# Patient Record
Sex: MALE | Race: WHITE | NOT HISPANIC OR LATINO | Employment: OTHER | URBAN - METROPOLITAN AREA
[De-identification: names, ages, dates, MRNs, and addresses within clinical notes are randomized per-mention and may not be internally consistent; named-entity substitution may affect disease eponyms.]

---

## 2018-05-02 LAB
ALBUMIN (HISTORICAL): 3 MG/DL
ALBUMIN SERPL BCP-MCNC: 4.2 G/DL (ref 3.5–5.7)
ALP SERPL-CCNC: 75 IU/L (ref 55–165)
ALT SERPL W P-5'-P-CCNC: 26 IU/L (ref 10–35)
ANION GAP SERPL CALCULATED.3IONS-SCNC: 11.1 MM/L
AST SERPL W P-5'-P-CCNC: 25 U/L (ref 8–27)
BASOPHILS # BLD AUTO: 0 X3/UL (ref 0–0.3)
BASOPHILS # BLD AUTO: 0.7 % (ref 0–2)
BILIRUB SERPL-MCNC: 0.4 MG/DL (ref 0.3–1)
BILIRUB UR QL STRIP: NEGATIVE
BILIRUBIN DIRECT (HISTORICAL): 0.1 MG/DL (ref 0–0.2)
BUN SERPL-MCNC: 18 MG/DL (ref 7–25)
CALCIUM SERPL-MCNC: 9.3 MG/DL (ref 8.6–10.5)
CHLORIDE SERPL-SCNC: 105 MM/L (ref 98–107)
CHOLEST SERPL-MCNC: 178 MG/DL (ref 0–200)
CLARITY UR: CLEAR
CO2 SERPL-SCNC: 28 MM/L (ref 21–31)
COLOR UR: YELLOW
CREAT SERPL-MCNC: 0.84 MG/DL (ref 0.7–1.3)
DEPRECATED RDW RBC AUTO: 15.4 %
EGFR (HISTORICAL): > 60 GFR
EGFR AFRICAN AMERICAN (HISTORICAL): > 60 GFR
EOSINOPHIL # BLD AUTO: 0.4 X3/UL (ref 0–0.5)
EOSINOPHIL NFR BLD AUTO: 5.4 % (ref 0–5)
EST. AVERAGE GLUCOSE BLD GHB EST-MCNC: 136 MG/DL
GLUCOSE (HISTORICAL): 135 MG/DL (ref 65–99)
GLUCOSE UR STRIP-MCNC: NEGATIVE MG/DL
HBA1C MFR BLD HPLC: 6.4 % (ref 4–6.2)
HCT VFR BLD AUTO: 43.2 % (ref 42–52)
HDLC SERPL-MCNC: 40 MG/DL (ref 40–60)
HGB BLD-MCNC: 14.7 G/DL (ref 14–18)
HGB UR QL STRIP.AUTO: NEGATIVE
KETONES UR STRIP-MCNC: NEGATIVE MG/DL
LDLC SERPL CALC-MCNC: 112.6 MG/DL (ref 75–193)
LEUKOCYTE ESTERASE UR QL STRIP: NEGATIVE
LYMPHOCYTES # BLD AUTO: 1.8 X3/UL (ref 1.2–4.2)
LYMPHOCYTES NFR BLD AUTO: 26.7 % (ref 20.5–51.1)
MCH RBC QN AUTO: 30 PG (ref 26–34)
MCHC RBC AUTO-ENTMCNC: 34 G/DL (ref 31–37)
MCV RBC AUTO: 88.2 FL (ref 81–99)
MONOCYTES # BLD AUTO: 0.8 X3/UL (ref 0–1)
MONOCYTES NFR BLD AUTO: 11.9 % (ref 1.7–12)
NEUTROPHILS # BLD AUTO: 3.8 X3/UL (ref 1.4–6.5)
NEUTS SEG NFR BLD AUTO: 55.3 % (ref 42.2–75.2)
NITRITE UR QL STRIP: NEGATIVE
OSMOLALITY, SERUM (HISTORICAL): 283 MOSM (ref 262–291)
PH UR STRIP.AUTO: 6 [PH] (ref 4.5–8)
PLATELET # BLD AUTO: 218 X3/UL (ref 130–400)
PMV BLD AUTO: 8.3 FL
POTASSIUM SERPL-SCNC: 4.1 MM/L (ref 3.5–5.5)
PROSTATE SPECIFIC ANTIGEN FREE (HISTORICAL): 0.2 NG/ML (ref 0.2–4.9)
PROSTATE SPECIFIC ANTIGEN PERCENT FREE (HISTORICAL): 35 %
PROT UR STRIP-MCNC: NEGATIVE MG/DL
PSA (HISTORICAL): 0.57 NG/ML (ref 0–4)
RBC # BLD AUTO: 4.9 X6/UL (ref 4.3–5.9)
SODIUM SERPL-SCNC: 140 MM/L (ref 134–143)
SP GR UR STRIP.AUTO: 1.02 (ref 1–1.03)
TOTAL PROTEIN (HISTORICAL): 6.7 G/DL (ref 6.4–8.9)
TRIGL SERPL-MCNC: 126 MG/DL (ref 44–166)
UROBILINOGEN UR QL STRIP.AUTO: 0.2 EU/DL (ref 0.2–8)
VLDL CHOLESTEROL (HISTORICAL): 25 MG/DL (ref 5–51)
WBC # BLD AUTO: 6.9 X3/UL (ref 4.8–10.8)

## 2019-03-19 ENCOUNTER — APPOINTMENT (OUTPATIENT)
Dept: LAB | Facility: HOSPITAL | Age: 69
End: 2019-03-19
Payer: MEDICARE

## 2019-03-19 ENCOUNTER — TRANSCRIBE ORDERS (OUTPATIENT)
Dept: ADMINISTRATIVE | Facility: HOSPITAL | Age: 69
End: 2019-03-19

## 2019-03-19 DIAGNOSIS — R53.83 FATIGUE, UNSPECIFIED TYPE: ICD-10-CM

## 2019-03-19 DIAGNOSIS — E11.8 TYPE 2 DIABETES MELLITUS WITH COMPLICATION, UNSPECIFIED WHETHER LONG TERM INSULIN USE: ICD-10-CM

## 2019-03-19 DIAGNOSIS — E11.8 TYPE 2 DIABETES MELLITUS WITH COMPLICATION, UNSPECIFIED WHETHER LONG TERM INSULIN USE: Primary | ICD-10-CM

## 2019-03-19 LAB
ALBUMIN SERPL BCP-MCNC: 4.1 G/DL (ref 3.5–5.7)
ALP SERPL-CCNC: 68 U/L (ref 55–165)
ALT SERPL W P-5'-P-CCNC: 56 U/L (ref 7–52)
ANION GAP SERPL CALCULATED.3IONS-SCNC: 8 MMOL/L (ref 4–13)
AST SERPL W P-5'-P-CCNC: 41 U/L (ref 13–39)
BASOPHILS # BLD AUTO: 0.1 THOUSANDS/ΜL (ref 0–0.1)
BASOPHILS NFR BLD AUTO: 1 % (ref 0–2)
BILIRUB DIRECT SERPL-MCNC: 0.1 MG/DL (ref 0–0.2)
BILIRUB SERPL-MCNC: 0.7 MG/DL (ref 0.2–1)
BUN SERPL-MCNC: 16 MG/DL (ref 7–25)
CALCIUM SERPL-MCNC: 9.5 MG/DL (ref 8.6–10.5)
CHLORIDE SERPL-SCNC: 103 MMOL/L (ref 98–107)
CHOLEST SERPL-MCNC: 162 MG/DL (ref 0–200)
CO2 SERPL-SCNC: 28 MMOL/L (ref 21–31)
CREAT SERPL-MCNC: 0.9 MG/DL (ref 0.7–1.3)
EOSINOPHIL # BLD AUTO: 0.4 THOUSAND/ΜL (ref 0–0.61)
EOSINOPHIL NFR BLD AUTO: 5 % (ref 0–5)
ERYTHROCYTE [DISTWIDTH] IN BLOOD BY AUTOMATED COUNT: 13.5 % (ref 11.5–14.5)
GFR SERPL CREATININE-BSD FRML MDRD: 87 ML/MIN/1.73SQ M
GLUCOSE P FAST SERPL-MCNC: 176 MG/DL (ref 65–99)
HCT VFR BLD AUTO: 45.2 % (ref 42–47)
HDLC SERPL-MCNC: 34 MG/DL (ref 40–60)
HGB BLD-MCNC: 15.5 G/DL (ref 14–18)
LDLC SERPL CALC-MCNC: 94 MG/DL (ref 75–193)
LYMPHOCYTES # BLD AUTO: 2 THOUSANDS/ΜL (ref 0.6–4.47)
LYMPHOCYTES NFR BLD AUTO: 24 % (ref 21–51)
MCH RBC QN AUTO: 30.5 PG (ref 26–34)
MCHC RBC AUTO-ENTMCNC: 34.3 G/DL (ref 31–37)
MCV RBC AUTO: 89 FL (ref 81–99)
MONOCYTES # BLD AUTO: 0.9 THOUSAND/ΜL (ref 0.17–1.22)
MONOCYTES NFR BLD AUTO: 11 % (ref 2–12)
NEUTROPHILS # BLD AUTO: 5 THOUSANDS/ΜL (ref 1.4–6.5)
NEUTS SEG NFR BLD AUTO: 59 % (ref 42–75)
NONHDLC SERPL-MCNC: 128 MG/DL
NRBC BLD AUTO-RTO: 0 /100 WBCS
PLATELET # BLD AUTO: 228 THOUSANDS/UL (ref 149–390)
PMV BLD AUTO: 9.3 FL (ref 8.6–11.7)
POTASSIUM SERPL-SCNC: 3.7 MMOL/L (ref 3.5–5.5)
PROT SERPL-MCNC: 6.9 G/DL (ref 6.4–8.9)
RBC # BLD AUTO: 5.08 MILLION/UL (ref 4.3–5.9)
SODIUM SERPL-SCNC: 139 MMOL/L (ref 134–143)
TRIGL SERPL-MCNC: 170 MG/DL (ref 44–166)
WBC # BLD AUTO: 8.4 THOUSAND/UL (ref 4.8–10.8)

## 2019-03-19 PROCEDURE — 80061 LIPID PANEL: CPT

## 2019-03-19 PROCEDURE — 80076 HEPATIC FUNCTION PANEL: CPT

## 2019-03-19 PROCEDURE — 80048 BASIC METABOLIC PNL TOTAL CA: CPT

## 2019-03-19 PROCEDURE — 85025 COMPLETE CBC W/AUTO DIFF WBC: CPT

## 2019-03-19 PROCEDURE — 36415 COLL VENOUS BLD VENIPUNCTURE: CPT

## 2019-08-19 ENCOUNTER — TRANSCRIBE ORDERS (OUTPATIENT)
Dept: ADMINISTRATIVE | Facility: HOSPITAL | Age: 69
End: 2019-08-19

## 2019-08-19 DIAGNOSIS — K76.0 FATTY LIVER: ICD-10-CM

## 2019-08-19 DIAGNOSIS — D12.5 BENIGN NEOPLASM OF SIGMOID COLON: ICD-10-CM

## 2019-08-19 DIAGNOSIS — K64.9 HEMORRHOIDS, UNSPECIFIED HEMORRHOID TYPE: ICD-10-CM

## 2019-08-19 DIAGNOSIS — K57.30 DIVERTICULOSIS LARGE INTESTINE W/O PERFORATION OR ABSCESS W/O BLEEDING: ICD-10-CM

## 2019-08-19 DIAGNOSIS — R94.5 ABNORMAL RESULTS OF LIVER FUNCTION STUDIES: Primary | ICD-10-CM

## 2019-08-24 ENCOUNTER — HOSPITAL ENCOUNTER (OUTPATIENT)
Dept: ULTRASOUND IMAGING | Facility: HOSPITAL | Age: 69
Discharge: HOME | End: 2019-08-24
Attending: INTERNAL MEDICINE
Payer: MEDICARE

## 2019-08-24 ENCOUNTER — TRANSCRIBE ORDERS (OUTPATIENT)
Dept: ADMINISTRATIVE | Facility: HOSPITAL | Age: 69
End: 2019-08-24

## 2019-08-24 ENCOUNTER — APPOINTMENT (OUTPATIENT)
Dept: LAB | Facility: HOSPITAL | Age: 69
End: 2019-08-24
Attending: INTERNAL MEDICINE
Payer: MEDICARE

## 2019-08-24 DIAGNOSIS — R94.5 ABNORMAL LIVER FUNCTION: ICD-10-CM

## 2019-08-24 DIAGNOSIS — K76.0 FATTY LIVER: ICD-10-CM

## 2019-08-24 DIAGNOSIS — K64.9 HEMORRHOIDS, UNSPECIFIED HEMORRHOID TYPE: ICD-10-CM

## 2019-08-24 DIAGNOSIS — R94.5 ABNORMAL LIVER FUNCTION: Primary | ICD-10-CM

## 2019-08-24 DIAGNOSIS — K57.30 DIVERTICULOSIS LARGE INTESTINE W/O PERFORATION OR ABSCESS W/O BLEEDING: ICD-10-CM

## 2019-08-24 DIAGNOSIS — R94.5 ABNORMAL RESULTS OF LIVER FUNCTION STUDIES: ICD-10-CM

## 2019-08-24 DIAGNOSIS — D12.5 BENIGN NEOPLASM OF SIGMOID COLON: ICD-10-CM

## 2019-08-24 LAB
FERRITIN SERPL-MCNC: 354 NG/ML (ref 8–388)
IRON SATN MFR SERPL: 36 %
IRON SERPL-MCNC: 104 UG/DL (ref 65–175)
TIBC SERPL-MCNC: 287 UG/DL (ref 250–450)

## 2019-08-24 PROCEDURE — 83550 IRON BINDING TEST: CPT

## 2019-08-24 PROCEDURE — 82728 ASSAY OF FERRITIN: CPT

## 2019-08-24 PROCEDURE — 80074 ACUTE HEPATITIS PANEL: CPT

## 2019-08-24 PROCEDURE — 82103 ALPHA-1-ANTITRYPSIN TOTAL: CPT

## 2019-08-24 PROCEDURE — 86038 ANTINUCLEAR ANTIBODIES: CPT

## 2019-08-24 PROCEDURE — 76700 US EXAM ABDOM COMPLETE: CPT

## 2019-08-24 PROCEDURE — 36415 COLL VENOUS BLD VENIPUNCTURE: CPT

## 2019-08-24 PROCEDURE — 82390 ASSAY OF CERULOPLASMIN: CPT

## 2019-08-24 PROCEDURE — 83540 ASSAY OF IRON: CPT

## 2019-08-24 PROCEDURE — 86235 NUCLEAR ANTIGEN ANTIBODY: CPT

## 2019-08-25 LAB
A1AT SERPL-MCNC: 127 MG/DL (ref 90–200)
CERULOPLASMIN SERPL-MCNC: 23.4 MG/DL (ref 16–31)

## 2019-08-26 LAB
ACTIN IGG SERPL-ACNC: 5 UNITS (ref 0–19)
RYE IGE QN: NEGATIVE

## 2019-08-28 LAB
HAV IGM SER QL: NORMAL
HBV CORE IGM SER QL: NORMAL
HBV SURFACE AG SER QL: NORMAL
HCV AB SER QL: NORMAL

## 2019-09-04 ENCOUNTER — TELEPHONE (OUTPATIENT)
Dept: NEPHROLOGY | Facility: CLINIC | Age: 69
End: 2019-09-04

## 2019-09-04 NOTE — TELEPHONE ENCOUNTER
Giuseppe Haiderak called for her  Lisa Cruz and stated that he was seen By Dr Kadeem Sanderson a gastroenterologist and stated that he should get a renal US due to a cyst on the kidneys  She wanted to know if he needs to come in for an appt because he was seen about a month ago or an order can be put in the chart for him to complete  Please advise

## 2019-09-05 DIAGNOSIS — N28.89 RENAL MASS, LEFT: Primary | ICD-10-CM

## 2019-09-05 NOTE — TELEPHONE ENCOUNTER
Kidney ultrasound reviewed, patient should proceed with an MRI to further evaluate the left sided cyst, the MRI will help us to determine if the cyst has any potential to turn into a tumor  If the likelihood is high then will go from there by referring to a Urologist, my hope though is that it confirms that it will likely not be tumor and we will continue to simply monitor with an ultrasound from time to time

## 2019-09-06 ENCOUNTER — HOSPITAL ENCOUNTER (EMERGENCY)
Facility: HOSPITAL | Age: 69
Discharge: HOME | End: 2019-09-06
Attending: EMERGENCY MEDICINE
Payer: MEDICARE

## 2019-09-06 ENCOUNTER — APPOINTMENT (EMERGENCY)
Dept: CT IMAGING | Facility: HOSPITAL | Age: 69
End: 2019-09-06
Payer: MEDICARE

## 2019-09-06 VITALS
WEIGHT: 265 LBS | RESPIRATION RATE: 18 BRPM | BODY MASS INDEX: 41.59 KG/M2 | SYSTOLIC BLOOD PRESSURE: 181 MMHG | HEIGHT: 67 IN | TEMPERATURE: 99.2 F | OXYGEN SATURATION: 98 % | HEART RATE: 80 BPM | DIASTOLIC BLOOD PRESSURE: 89 MMHG

## 2019-09-06 DIAGNOSIS — S22.32XA FRACTURE OF ONE RIB, LEFT SIDE, INITIAL ENCOUNTER FOR CLOSED FRACTURE: ICD-10-CM
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
## 2019-09-06 DIAGNOSIS — R91.1 PULMONARY NODULE: ICD-10-CM

## 2019-09-06 PROCEDURE — 90471 IMMUNIZATION ADMIN: CPT

## 2019-09-06 PROCEDURE — 99284 EMERGENCY DEPT VISIT MOD MDM: CPT

## 2019-09-06 PROCEDURE — 71250 CT THORAX DX C-: CPT

## 2019-09-06 PROCEDURE — 90715 TDAP VACCINE 7 YRS/> IM: CPT | Performed by: EMERGENCY MEDICINE

## 2019-09-06 PROCEDURE — 96372 THER/PROPH/DIAG INJ SC/IM: CPT

## 2019-09-06 RX ORDER — OXYCODONE HYDROCHLORIDE AND ACETAMINOPHEN 5; 325 MG/1; MG/1
1 TABLET ORAL EVERY 4 HOURS PRN
Qty: 15 TABLET | Refills: 0 | Status: SHIPPED | OUTPATIENT
Start: 2019-09-06 | End: 2019-09-24

## 2019-09-06 RX ORDER — OXYCODONE HYDROCHLORIDE AND ACETAMINOPHEN 5; 325 MG/1; MG/1
1 TABLET ORAL ONCE
Status: COMPLETED | OUTPATIENT
Start: 2019-09-06 | End: 2019-09-06

## 2019-09-06 RX ORDER — KETOROLAC TROMETHAMINE 30 MG/ML
30 INJECTION, SOLUTION INTRAMUSCULAR; INTRAVENOUS ONCE
Status: COMPLETED | OUTPATIENT
Start: 2019-09-06 | End: 2019-09-06

## 2019-09-06 RX ADMIN — KETOROLAC TROMETHAMINE 30 MG: 30 INJECTION, SOLUTION INTRAMUSCULAR; INTRAVENOUS at 19:41

## 2019-09-06 RX ADMIN — OXYCODONE HYDROCHLORIDE AND ACETAMINOPHEN 1 TABLET: 5; 325 TABLET ORAL at 19:04

## 2019-09-06 RX ADMIN — TETANUS TOXOID, REDUCED DIPHTHERIA TOXOID AND ACELLULAR PERTUSSIS VACCINE, ADSORBED 0.5 ML: 5; 2.5; 8; 8; 2.5 SUSPENSION INTRAMUSCULAR at 19:16

## 2019-09-06 NOTE — ED PROVIDER NOTES
History  Chief Complaint   Patient presents with    Fall     fell off tractor onto Left chest now with pain     Patient is a 17-year-old male with a history of diabetes and hypertension who takes no anticoagulants tetanus is not up-to-date was riding on a  and while getting off stumbled over the side of  and fell to his side striking his left upper lateral chest wall on a metal bar no head on had no fall to the ground no loss of consciousness no injury to the extremities sustained a few abrasions on the arm  No neck pain no head injury no anticoagulants  Patient complains of pain in the upper lateral left chest wall  History provided by:  Patient and relative  Fall   Mechanism of injury: fall    Injury location:  Torso  Torso injury location:  L chest  Incident location:  Around machinery  Time since incident:  2 hours  Fall:     Point of impact: Left chest wall  Suspicion of alcohol use: no    Tetanus status:  Out of date  Prior to arrival data:     Responsiveness at scene:  Alert    Orientation at scene:  Person, place, situation and time    Loss of consciousness: no      Amnesic to event: no    Associated symptoms: no abdominal pain, no headaches, no nausea and no vomiting        None       Past Medical History:   Diagnosis Date    Diabetes mellitus ([facility] [address] )     Disease of thyroid gland     Hypertension        Past Surgical History:   Procedure Laterality Date    APPENDECTOMY         History reviewed  No pertinent family history  I have reviewed and agree with the history as documented  Social History     Tobacco Use    Smoking status: Never Smoker    Smokeless tobacco: Never Used   Substance Use Topics    Alcohol use: Not on file    Drug use: Never        Review of Systems   Constitutional: Negative for activity change, appetite change, chills, fatigue and fever  HENT: Negative for congestion, ear pain, rhinorrhea and sore throat      Eyes: Negative for discharge, redness and visual disturbance  Respiratory: Negative for cough, chest tightness, shortness of breath and wheezing  Cardiovascular: Negative for palpitations  Left chest wall pain   Gastrointestinal: Negative for abdominal pain, constipation, diarrhea, nausea and vomiting  Endocrine: Negative for polydipsia and polyuria  Genitourinary: Negative for difficulty urinating, dysuria, frequency, hematuria and urgency  Musculoskeletal: Negative for arthralgias and myalgias  Skin: Negative for color change, pallor and rash  Neurological: Negative for dizziness, weakness, light-headedness, numbness and headaches  Hematological: Negative for adenopathy  Does not bruise/bleed easily  All other systems reviewed and are negative  Physical Exam  Physical Exam   Constitutional: He is oriented to person, place, and time  He appears well-developed and well-nourished  HENT:   Head: Normocephalic and atraumatic  Right Ear: External ear normal    Left Ear: External ear normal    Nose: Nose normal    Mouth/Throat: Oropharynx is clear and moist    Eyes: Pupils are equal, round, and reactive to light  Conjunctivae and EOM are normal    Neck: Normal range of motion  Neck supple  Cardiovascular: Normal rate, regular rhythm, normal heart sounds and intact distal pulses  Pulmonary/Chest: Effort normal and breath sounds normal  No respiratory distress  He has no wheezes  He has no rales  He exhibits tenderness  He exhibits no crepitus, no edema, no deformity and no retraction  Tenderness in the left upper lateral chest wall 4-6 inches below the left axilla no abdominal wall tenderness   Abdominal: Soft  Bowel sounds are normal  He exhibits no distension  There is no tenderness  There is no guarding  Musculoskeletal: Normal range of motion  Neurological: He is alert and oriented to person, place, and time  No cranial nerve deficit or sensory deficit  Skin: Skin is warm and dry     Psychiatric: He has a normal mood and affect  Nursing note and vitals reviewed  Vital Signs  ED Triage Vitals [09/06/19 1841]   Temperature Pulse Respirations Blood Pressure SpO2   99 2 °F (37 3 °C) 80 18 (!) 181/89 98 %      Temp Source Heart Rate Source Patient Position - Orthostatic VS BP Location FiO2 (%)   Tympanic Monitor Sitting Left arm --      Pain Score       8           Vitals:    09/06/19 1841   BP: (!) 181/89   Pulse: 80   Patient Position - Orthostatic VS: Sitting         Visual Acuity      ED Medications  Medications   oxyCODONE-acetaminophen (PERCOCET) 5-325 mg per tablet 1 tablet (1 tablet Oral Given 9/6/19 1904)   tetanus-diphtheria-acellular pertussis (BOOSTRIX) IM injection 0 5 mL (0 5 mL Intramuscular Given 9/6/19 1916)   ketorolac (TORADOL) injection 30 mg (30 mg Intramuscular Given 9/6/19 1941)       Diagnostic Studies  Results Reviewed     None                 CT chest without contrast   Final Result by Te Dennis MD (09/06 1957)      1  Essentially nondisplaced fracture of the left lateral 3rd rib  2   Scattered subcentimeter pulmonary nodules measuring up to 3 mm in size  Based on current Fleischner Society 2017 Guidelines on incidental pulmonary nodule, no routine follow-up is needed if the patient is considered low risk for lung cancer  If the    patient is considered high risk for lung cancer, 12 month follow-up non-contrast chest CT is recommended  The study was marked in EPIC for significant notification              Workstation performed: [id number]                    Procedures  Procedures       ED Course                               MDM  Number of Diagnoses or Management Options  Fall, initial encounter: new and requires workup  Fracture of one rib, left side, initial encounter for closed fracture: new and requires workup  Pulmonary nodule: new and requires workup  Diagnosis management comments: Patient remains clinically and hemodynamically stable in the emergency department he has made aware of pulmonary nodule incidentally found on CT also advised rest and supportive care and taking deep breaths for acute rib fracture advised follow-up with PCP for re-evaluation and obtain test results return precautions and anticipatory guidance discussed  Amount and/or Complexity of Data Reviewed  Tests in the radiology section of CPT®: ordered and reviewed  Independent visualization of images, tracings, or specimens: yes    Risk of Complications, Morbidity, and/or Mortality  Presenting problems: moderate  Diagnostic procedures: moderate  Management options: low    Patient Progress  Patient progress: stable      Disposition  Final diagnoses:   Fall, initial encounter   Fracture of one rib, left side, initial encounter for closed fracture - Left 3rd nondisplaced   Pulmonary nodule     Time reflects when diagnosis was documented in both MDM as applicable and the Disposition within this note     Time User Action Codes Description Comment    9/6/2019  8:02 PM Tiffany Clark Add Vijayse Norton  SYOL] Fall, initial encounter     9/6/2019  8:03 PM Shelbi Cabrera Add [S22 32XA] Fracture of one rib, left side, initial encounter for closed fracture     9/6/2019  8:03 PM Kavon Cabrera Modify [S22 32XA] Fracture of one rib, left side, initial encounter for closed fracture Left 3rd nondisplaced    9/6/2019  8:03 PM Tiffany Clark Add [R91 1] Pulmonary nodule       ED Disposition     ED Disposition Condition Date/Time Comment    Discharge Stable Fri Sep 6, 2019  8:02 PM [facility] discharge to home/self care              Follow-up Information     Follow up With Specialties Details Why [address] [facility], [address] [facility], Nurse Practitioner Schedule an appointment as soon as possible for a visit in 3 days Re-evaluation for broken rib and follow-up for pulmonary nodule [id number]  [phone]            Discharge Medication List as of 9/6/2019  8:03 PM      START taking these medications    Details   oxyCODONE-acetaminophen (PERCOCET) 5-325 mg per tablet Take 1 tablet by mouth every 4 (four) hours as needed for moderate pain for up to 15 dosesMax Daily Amount: 6 tablets, Starting Fri 9/6/2019, Print           No discharge procedures on file      ED Provider  Electronically Signed by           Edenilson Salomon DO  09/07/19 8693

## 2019-09-09 ENCOUNTER — TELEPHONE (OUTPATIENT)
Dept: NEPHROLOGY | Facility: CLINIC | Age: 69
End: 2019-09-09

## 2019-09-09 ENCOUNTER — HOSPITAL ENCOUNTER (OUTPATIENT)
Dept: MRI IMAGING | Facility: HOSPITAL | Age: 69
Discharge: HOME | End: 2019-09-09
Attending: INTERNAL MEDICINE

## 2019-09-09 DIAGNOSIS — F41.9 ANXIETY: Primary | ICD-10-CM

## 2019-09-09 DIAGNOSIS — N28.89 RENAL MASS, LEFT: ICD-10-CM

## 2019-09-09 DIAGNOSIS — S22.32XA FRACTURE OF ONE RIB, LEFT SIDE, INITIAL ENCOUNTER FOR CLOSED FRACTURE: ICD-10-CM

## 2019-09-09 RX ORDER — ALPRAZOLAM 0.5 MG/1
0.5 TABLET ORAL 4 TIMES DAILY PRN
Qty: 4 TABLET | Refills: 0 | Status: SHIPPED | OUTPATIENT
Start: 2019-09-09 | End: 2019-09-09 | Stop reason: SDUPTHER

## 2019-09-09 RX ORDER — ALPRAZOLAM 0.5 MG/1
0.5 TABLET ORAL 4 TIMES DAILY PRN
Qty: 4 TABLET | Refills: 0 | Status: SHIPPED | OUTPATIENT
Start: 2019-09-09 | End: 2019-09-24

## 2019-09-09 NOTE — TELEPHONE ENCOUNTER
Patient's wife called the patient was scheduled for an mri today, however he was unable to go through the tube  She is asking for medication to be sent for him so he can have the MRI

## 2019-09-24 ENCOUNTER — OFFICE VISIT (OUTPATIENT)
Dept: FAMILY MEDICINE CLINIC | Facility: CLINIC | Age: 69
End: 2019-09-24
Payer: MEDICARE

## 2019-09-24 VITALS
WEIGHT: 268 LBS | BODY MASS INDEX: 42.06 KG/M2 | TEMPERATURE: 99.4 F | HEART RATE: 72 BPM | SYSTOLIC BLOOD PRESSURE: 114 MMHG | RESPIRATION RATE: 18 BRPM | OXYGEN SATURATION: 100 % | HEIGHT: 67 IN | DIASTOLIC BLOOD PRESSURE: 64 MMHG

## 2019-09-24 DIAGNOSIS — Z12.5 SCREENING PSA (PROSTATE SPECIFIC ANTIGEN): ICD-10-CM

## 2019-09-24 DIAGNOSIS — Z23 NEEDS FLU SHOT: ICD-10-CM

## 2019-09-24 DIAGNOSIS — E66.01 CLASS 3 SEVERE OBESITY DUE TO EXCESS CALORIES WITH SERIOUS COMORBIDITY AND BODY MASS INDEX (BMI) OF 40.0 TO 44.9 IN ADULT (HCC): ICD-10-CM

## 2019-09-24 DIAGNOSIS — E11.9 DIABETIC EYE EXAM (HCC): ICD-10-CM

## 2019-09-24 DIAGNOSIS — Z01.00 DIABETIC EYE EXAM (HCC): ICD-10-CM

## 2019-09-24 DIAGNOSIS — E13.8 DIABETES MELLITUS OF OTHER TYPE WITH COMPLICATION, UNSPECIFIED WHETHER LONG TERM INSULIN USE: Primary | ICD-10-CM

## 2019-09-24 DIAGNOSIS — Z00.00 ENCOUNTER FOR MEDICARE ANNUAL WELLNESS EXAM: ICD-10-CM

## 2019-09-24 DIAGNOSIS — E78.00 HYPERCHOLESTEREMIA: ICD-10-CM

## 2019-09-24 DIAGNOSIS — I10 ESSENTIAL HYPERTENSION: ICD-10-CM

## 2019-09-24 DIAGNOSIS — E11.9 ENCOUNTER FOR DIABETIC FOOT EXAM (HCC): ICD-10-CM

## 2019-09-24 DIAGNOSIS — R91.1 LUNG NODULE SEEN ON IMAGING STUDY: ICD-10-CM

## 2019-09-24 PROBLEM — E66.813 CLASS 3 SEVERE OBESITY DUE TO EXCESS CALORIES WITH SERIOUS COMORBIDITY AND BODY MASS INDEX (BMI) OF 40.0 TO 44.9 IN ADULT (HCC): Status: ACTIVE | Noted: 2019-09-24

## 2019-09-24 LAB
CREAT UR-MCNC: 206 MG/DL
MICROALBUMIN UR-MCNC: 61.8 MG/L (ref 0–20)
MICROALBUMIN/CREAT 24H UR: 30 MG/G CREATININE (ref 0–30)
SL AMB POCT HEMOGLOBIN AIC: 6.6 (ref ?–6.5)

## 2019-09-24 PROCEDURE — 83036 HEMOGLOBIN GLYCOSYLATED A1C: CPT | Performed by: NURSE PRACTITIONER

## 2019-09-24 PROCEDURE — G0008 ADMIN INFLUENZA VIRUS VAC: HCPCS | Performed by: NURSE PRACTITIONER

## 2019-09-24 PROCEDURE — 90662 IIV NO PRSV INCREASED AG IM: CPT | Performed by: NURSE PRACTITIONER

## 2019-09-24 PROCEDURE — 82570 ASSAY OF URINE CREATININE: CPT | Performed by: NURSE PRACTITIONER

## 2019-09-24 PROCEDURE — 99214 OFFICE O/P EST MOD 30 MIN: CPT | Performed by: NURSE PRACTITIONER

## 2019-09-24 PROCEDURE — G0438 PPPS, INITIAL VISIT: HCPCS | Performed by: NURSE PRACTITIONER

## 2019-09-24 PROCEDURE — 82043 UR ALBUMIN QUANTITATIVE: CPT | Performed by: NURSE PRACTITIONER

## 2019-09-24 RX ORDER — POTASSIUM CHLORIDE 20 MEQ/1
20 TABLET, EXTENDED RELEASE ORAL DAILY
COMMUNITY
End: 2019-11-08 | Stop reason: SDUPTHER

## 2019-09-24 RX ORDER — LANCETS 33 GAUGE
EACH MISCELLANEOUS
COMMUNITY
Start: 2018-03-13 | End: 2021-06-16 | Stop reason: SDUPTHER

## 2019-09-24 RX ORDER — GLYBURIDE 5 MG/1
5 TABLET ORAL 2 TIMES DAILY
COMMUNITY
Start: 2019-08-18 | End: 2020-02-21 | Stop reason: SDUPTHER

## 2019-09-24 RX ORDER — FEXOFENADINE HCL 180 MG/1
180 TABLET ORAL DAILY PRN
COMMUNITY
End: 2021-07-06

## 2019-09-24 RX ORDER — POTASSIUM CHLORIDE 1.5 G/1.77G
POWDER, FOR SOLUTION ORAL
COMMUNITY
End: 2019-09-24

## 2019-09-24 RX ORDER — AMLODIPINE BESYLATE 10 MG/1
10 TABLET ORAL DAILY
COMMUNITY
Start: 2019-09-01 | End: 2020-02-21 | Stop reason: SDUPTHER

## 2019-09-24 RX ORDER — HYDROCHLOROTHIAZIDE 12.5 MG/1
12.5 TABLET ORAL DAILY
COMMUNITY
Start: 2019-08-18 | End: 2019-11-08 | Stop reason: SDUPTHER

## 2019-09-24 NOTE — PROGRESS NOTES
[facility]        NAME: Izabella Nayak is a 71 y o  male  : 1950    MRN: [id number]  DATE: 2019  TIME: 10:00 AM    Assessment and Plan   Diabetes mellitus of other type with complication, unspecified whether long term insulin use [E13 8]  1  Diabetes mellitus of other type with complication, unspecified whether long term insulin use  POCT hemoglobin A1c    Comprehensive metabolic panel    CBC and differential   2  Hypercholesteremia  Lipid panel   3  Screening PSA (prostate specific antigen)  PSA, total and free   4  Needs flu shot  influenza vaccine, [id number], high-dose, PF 0 5 mL (FLUZONE HIGH-DOSE)   5  Encounter for Medicare annual wellness exam     6  Encounter for diabetic foot exam ([place] [address] )     7  Class 3 severe obesity due to excess calories with serious comorbidity and body mass index (BMI) of 40 0 to 44 9 in adult ([facility] [address] )     8  Essential hypertension     9  Lung nodule seen on imaging study           Patient Instructions     Patient Instructions   Get labs as ordered  HgA1c today 6 6  Discussed diabetic diet/strict carb counting (60GM/day)  Colonoscopy UTD  Will get repeat CT scan lungs in 1 year for lung nodule  Call or return for problems/concerns          Chief Complaint     Chief Complaint   Patient presents with   [place] Establish Care         History of Present Illness       Here to establish care- previously seen by Nessa Abel-  Broken rib- reviewed CT scan from 19- 3mm lung nodules seen- recommended repeat CT scan in 12 months for high risk- 1-2ppd x 23 years- will get repeat CT scan 2020  Broken right left lateral 3rd rib- nondisplaced  Left kidney cyst seen on US- getting open MRI tomorrow  Also concerned about fatty liver- discussed weight loss to treat      Review of Systems   Review of Systems   Constitutional: Negative for activity change, diaphoresis, fatigue and fever     HENT: Negative for congestion, facial swelling, hearing loss, rhinorrhea, sinus pressure, sinus pain, sneezing, sore throat and voice change  Eyes: Negative for discharge and visual disturbance  Respiratory: Negative for cough, choking, chest tightness, shortness of breath, wheezing and stridor  Cardiovascular: Positive for chest pain (left 3rd rib fracture x 3 weeks)  Negative for palpitations and leg swelling  Gastrointestinal: Negative for abdominal distention, abdominal pain, constipation, diarrhea, nausea and vomiting  Endocrine: Negative for polydipsia, polyphagia and polyuria  Genitourinary: Negative for difficulty urinating, dysuria, frequency and urgency  Musculoskeletal: Negative for arthralgias, back pain, gait problem, joint swelling, myalgias, neck pain and neck stiffness  Skin: Negative for color change, rash and wound  Neurological: Negative for dizziness, syncope, speech difficulty, weakness, light-headedness and headaches  Hematological: Negative for adenopathy  Does not bruise/bleed easily  Psychiatric/Behavioral: Negative for agitation, behavioral problems, confusion, hallucinations, sleep disturbance and suicidal ideas  The patient is not nervous/anxious            Current Medications       Current Outpatient Medications:     amLODIPine (NORVASC) 10 mg tablet, Take 10 mg by mouth daily, Disp: , Rfl:     NELLY ASPIRIN EC LOW DOSE PO, Take 81 mg by mouth daily, Disp: , Rfl:     fexofenadine (ALLEGRA ALLERGY) 180 MG tablet, Take 180 mg by mouth daily as needed, Disp: , Rfl:     glyBURIDE (DIABETA) 5 mg tablet, Take 5 mg by mouth 2 (two) times a day, Disp: , Rfl:     hydrochlorothiazide (HYDRODIURIL) 12 5 mg tablet, Take 12 5 mg by mouth daily, Disp: , Rfl:     Multiple Vitamins-Minerals (CENTRUM SILVER 50+MEN PO), Centrum Silver, Disp: , Rfl:     ONETOUCH DELICA LANCETS 36P MISC, TEST daily, Disp: , Rfl:     potassium chloride (K-DUR,KLOR-CON) 20 mEq tablet, Take 20 mEq by mouth daily, Disp: , Rfl:     Current Allergies     Allergies as of 09/24/2019    (No Known Allergies)            The following portions of the patient's history were reviewed and updated as appropriate: allergies, current medications, past family history, past medical history, past social history, past surgical history and problem list      Past Medical History:   Diagnosis Date    Diabetes mellitus ([address] )     Disease of thyroid gland     Hypertension        Past Surgical History:   Procedure Laterality Date    APPENDECTOMY         History reviewed  No pertinent family history  Medications have been verified  Objective   /64   Pulse 72   Temp 99 4 °F (37 4 °C)   Resp 18   Ht 5' 7" (1 702 m)   Wt 122 kg (268 lb)   SpO2 100%   BMI 41 97 kg/m²        Physical Exam     Physical Exam   Constitutional: He is oriented to person, place, and time  He appears well-developed and well-nourished  No distress  Neck: Normal range of motion  Neck supple  No tracheal deviation present  No thyromegaly present  Cardiovascular: Normal rate, regular rhythm and normal heart sounds  Pulses are no weak pulses  No murmur heard  Pulses:       Dorsalis pedis pulses are 2+ on the right side, and 2+ on the left side  Pulmonary/Chest: Effort normal and breath sounds normal  No respiratory distress  He has no wheezes  Musculoskeletal: Normal range of motion  He exhibits no edema, tenderness or deformity  Feet:   Right Foot:   Skin Integrity: Positive for dry skin  Negative for ulcer, skin breakdown, erythema, warmth or callus  Left Foot:   Skin Integrity: Positive for dry skin  Negative for ulcer, skin breakdown, erythema, warmth or callus  Neurological: He is alert and oriented to person, place, and time  Skin: Skin is warm and dry  He is not diaphoretic  Psychiatric: He has a normal mood and affect  His behavior is normal  Judgment and thought content normal    Nursing note and vitals reviewed  BMI Counseling: Body mass index is 41 97 kg/m²   The BMI is above normal  Nutrition recommendations include reducing fast food intake, consuming healthier snacks, decreasing soda and/or juice intake and moderation in carbohydrate intake  PHQ-9 Depression Screening    PHQ-9:    Frequency of the following problems over the past two weeks:       Little interest or pleasure in doing things:  0 - not at all  Feeling down, depressed, or hopeless:  0 - not at all  PHQ-2 Score:  0       Patient's shoes and socks removed  Right Foot/Ankle   Right Foot Inspection  Skin Exam: skin normal, skin intact and dry skin no warmth, no callus, no erythema, no maceration, no abnormal color, no pre-ulcer, no ulcer and no callus                          Toe Exam: ROM and strength within normal limits  Sensory       Monofilament testing: intact  Vascular  Capillary refills: < 3 seconds  The right DP pulse is 2+  Left Foot/Ankle  Left Foot Inspection  Skin Exam: skin normal, skin intact and dry skinno warmth, no erythema, no maceration, normal color, no pre-ulcer, no ulcer and no callus                         Toe Exam: ROM and strength within normal limits                   Sensory       Monofilament: intact  Vascular  Capillary refills: < 3 seconds  The left DP pulse is 2+  Assign Risk Category:  No deformity present; No loss of protective sensation;  No weak pulses       Risk: 0

## 2019-09-24 NOTE — PATIENT INSTRUCTIONS
Get labs as ordered  HgA1c today 6 6  Discussed diabetic diet/strict carb counting (60GM/day)  Colonoscopy UTD  Will get repeat CT scan lungs in 1 year for lung nodule  Call or return for problems/concerns

## 2019-09-24 NOTE — PROGRESS NOTES
Assessment and Plan:     Problem List Items Addressed This Visit        Endocrine    Diabetes mellitus ([facility] )    Relevant Medications    glyBURIDE (DIABETA) 5 mg tablet    Other Relevant Orders    POCT hemoglobin A1c (Completed)    Comprehensive metabolic panel    CBC and differential       Cardiovascular and Mediastinum    Hypertension    Relevant Medications    amLODIPine (NORVASC) 10 mg tablet    hydrochlorothiazide (HYDRODIURIL) 12 5 mg tablet       Other    Hypercholesteremia    Relevant Orders    Lipid panel    Encounter for diabetic foot exam ([place] [address] )    Class 3 severe obesity due to excess calories with serious comorbidity and body mass index (BMI) of 40 0 to 44 9 in adult [facility])    Lung nodule seen on imaging study      Other Visit Diagnoses     Encounter for Medicare annual wellness exam    -  Primary    Screening PSA (prostate specific antigen)        Relevant Orders    PSA, total and free    Needs flu shot        Relevant Orders    influenza vaccine, 6440-1151, high-dose, PF 0 5 mL (FLUZONE HIGH-DOSE)        BMI Counseling: Body mass index is 41 97 kg/m²  The BMI is above normal  Nutrition recommendations include decreasing fast food intake, consuming healthier snacks, limiting drinks that contain sugar and moderation in carbohydrate intake  No pharmacotherapy was ordered  Preventive health issues were discussed with patient, and age appropriate screening tests were ordered as noted in patient's After Visit Summary  Personalized health advice and appropriate referrals for health education or preventive services given if needed, as noted in patient's After Visit Summary       History of Present Illness:     Patient presents for Medicare Annual Wellness visit    Patient Care Team:  Janay Pineda as PCP - General (Family Medicine)     Problem List:     Patient Active Problem List   Diagnosis    Diabetes mellitus ([facility] )    Hypercholesteremia    Encounter for diabetic foot exam ([place] [address] )    Class 3 severe obesity due to excess calories with serious comorbidity and body mass index (BMI) of 40 0 to 44 9 in adult ([place] [address] )    Hypertension    Lung nodule seen on imaging study      Past Medical and Surgical History:     Past Medical History:   Diagnosis Date    Diabetes mellitus ([facility] )     Disease of thyroid gland     Hypertension      Past Surgical History:   Procedure Laterality Date    APPENDECTOMY        Family History:     History reviewed  No pertinent family history     Social History:     Social History     Socioeconomic History    Marital status: /Civil Union     Spouse name: None    Number of children: None    Years of education: None    Highest education level: None   Occupational History    None   Social Needs    Financial resource strain: None    Food insecurity:     Worry: None     Inability: None    Transportation needs:     Medical: None     Non-medical: None   Tobacco Use    Smoking status: Never Smoker    Smokeless tobacco: Never Used   Substance and Sexual Activity    Alcohol use: None    Drug use: Never    Sexual activity: None   Lifestyle    Physical activity:     Days per week: None     Minutes per session: None    Stress: None   Relationships    Social connections:     Talks on phone: None     Gets together: None     Attends Shinto service: None     Active member of club or organization: None     Attends meetings of clubs or organizations: None     Relationship status: None    Intimate partner violence:     Fear of current or ex partner: None     Emotionally abused: None     Physically abused: None     Forced sexual activity: None   Other Topics Concern    None   Social History Narrative    None       Medications and Allergies:     Current Outpatient Medications   Medication Sig Dispense Refill    amLODIPine (NORVASC) 10 mg tablet Take 10 mg by mouth daily      NELLY ASPIRIN EC LOW DOSE PO Take 81 mg by mouth daily      fexofenadine (ALLEGRA ALLERGY) 180 MG tablet Take 180 mg by mouth daily as needed      glyBURIDE (DIABETA) 5 mg tablet Take 5 mg by mouth 2 (two) times a day      hydrochlorothiazide (HYDRODIURIL) 12 5 mg tablet Take 12 5 mg by mouth daily      Multiple Vitamins-Minerals (CENTRUM SILVER 50+MEN PO) Centrum Silver      ONETOUCH DELICA LANCETS 49S MISC TEST daily      potassium chloride (K-DUR,KLOR-CON) 20 mEq tablet Take 20 mEq by mouth daily       No current facility-administered medications for this visit  No Known Allergies   Immunizations:     Immunization History   Administered Date(s) Administered    INFLUENZA 10/20/2016, 10/11/2017, 09/19/2018    Pneumococcal Conjugate 13-Valent 10/20/2016    Pneumococcal Polysaccharide PPV23 03/12/2018    Tdap 10/26/2014, 09/06/2019    Zoster 11/10/2014      Health Maintenance:         Topic Date Due    CRC Screening: Colonoscopy  1950    CRC Screening: FOBTx3/FIT  08/08/2000    Hepatitis C Screening  Completed         Topic Date Due    HEPATITIS B VACCINES (1 of 3 - Risk 3-dose series) 08/08/1969    INFLUENZA VACCINE  07/01/2019      Medicare Health Risk Assessment:     /64   Pulse 72   Temp 99 4 °F (37 4 °C)   Resp 18   Ht 5' 7" (1 702 m)   Wt 122 kg (268 lb)   SpO2 100%   BMI 41 97 kg/m²      Traci Paulie is here for his Subsequent Wellness visit  Health Risk Assessment:   Patient rates overall health as good  Patient feels that their physical health rating is same  Eyesight was rated as same  Hearing was rated as same  Patient feels that their emotional and mental health rating is same  Pain experienced in the last 7 days has been some  Patient states that he has experienced no weight loss or gain in last 6 months  Depression Screening:   PHQ-2 Score: 0      Fall Risk Screening: In the past year, patient has experienced: no history of falling in past year      Home Safety:  Patient does not have trouble with stairs inside or outside of their home   Patient has working smoke alarms and has working carbon monoxide detector  Home safety hazards include: none  Nutrition:   Current diet is Regular  Medications:   Patient is currently taking over-the-counter supplements  OTC medications include: see medication list  Patient is able to manage medications  Activities of Daily Living (ADLs)/Instrumental Activities of Daily Living (IADLs):   Walk and transfer into and out of bed and chair?: Yes  Dress and groom yourself?: Yes    Bathe or shower yourself?: Yes    Feed yourself?  Yes  Do your laundry/housekeeping?: Yes  Manage your money, pay your bills and track your expenses?: Yes  Make your own meals?: Yes    Do your own shopping?: Yes    Previous Hospitalizations:   Any hospitalizations or ED visits within the last 12 months?: No      Advance Care Planning:   Living will: No    Durable POA for healthcare: No    Advanced directive: No    Advanced directive counseling given: No    Five wishes given: No    Patient declined ACP directive: No    End of Life Decisions reviewed with patient: No    Provider agrees with end of life decisions: No      PREVENTIVE SCREENINGS      Cardiovascular Screening:    General: History Lipid Disorder and Screening Current      Diabetes Screening:     General: History Diabetes and Screening Current      Colorectal Cancer Screening:     General: Screening Current      Prostate Cancer Screening:      Due for: PSA      Osteoporosis Screening:    General: Screening Not Indicated      Abdominal Aortic Aneurysm (AAA) Screening:    Risk factors include: age between 73-69 yo        Lung Cancer Screening:     General: Screening Current      Hepatitis C Screening:    General: Screening Current      BRANDI Rivera

## 2019-09-30 LAB
LEFT EYE DIABETIC RETINOPATHY: NORMAL
RIGHT EYE DIABETIC RETINOPATHY: NORMAL

## 2019-10-22 ENCOUNTER — OFFICE VISIT (OUTPATIENT)
Dept: FAMILY MEDICINE CLINIC | Facility: CLINIC | Age: 69
End: 2019-10-22
Payer: MEDICARE

## 2019-10-22 VITALS
SYSTOLIC BLOOD PRESSURE: 144 MMHG | HEART RATE: 72 BPM | WEIGHT: 286 LBS | OXYGEN SATURATION: 98 % | TEMPERATURE: 100 F | DIASTOLIC BLOOD PRESSURE: 68 MMHG | HEIGHT: 67 IN | RESPIRATION RATE: 18 BRPM | BODY MASS INDEX: 44.89 KG/M2

## 2019-10-22 DIAGNOSIS — M54.50 ACUTE RIGHT-SIDED LOW BACK PAIN WITHOUT SCIATICA: Primary | ICD-10-CM

## 2019-10-22 PROCEDURE — 99214 OFFICE O/P EST MOD 30 MIN: CPT | Performed by: NURSE PRACTITIONER

## 2019-10-22 RX ORDER — PREDNISONE 20 MG/1
TABLET ORAL
Qty: 18 TABLET | Refills: 0 | Status: SHIPPED | OUTPATIENT
Start: 2019-10-22 | End: 2020-02-21

## 2019-10-22 RX ORDER — PIOGLITAZONEHYDROCHLORIDE 15 MG/1
TABLET ORAL
COMMUNITY
Start: 2019-09-26 | End: 2020-02-21 | Stop reason: SDUPTHER

## 2019-10-22 RX ORDER — CYCLOBENZAPRINE HCL 10 MG
10 TABLET ORAL 3 TIMES DAILY PRN
Qty: 30 TABLET | Refills: 0 | Status: SHIPPED | OUTPATIENT
Start: 2019-10-22 | End: 2020-03-19

## 2019-10-22 RX ORDER — NAPROXEN 500 MG/1
500 TABLET ORAL 2 TIMES DAILY WITH MEALS
Qty: 30 TABLET | Refills: 0 | Status: SHIPPED | OUTPATIENT
Start: 2019-10-22 | End: 2020-03-19

## 2019-10-22 NOTE — PROGRESS NOTES
[address] [facility]        NAME: Eloy Parikh is a 71 y o  male  : 1950    MRN: [id number]  DATE: 2019  TIME: 2:39 PM    Assessment and Plan   Acute right-sided low back pain without sciatica [M54 5]  1  Acute right-sided low back pain without sciatica  predniSONE 20 mg tablet    cyclobenzaprine (FLEXERIL) 10 mg tablet    naproxen (NAPROSYN) 500 mg tablet         Patient Instructions     Patient Instructions   Take flexeril at night  Take prednisone and naproxen as prescribed  Heating pad to lower back  Chief Complaint     Chief Complaint   Patient presents with    Back Pain         History of Present Illness        Patient here with c/o back patient starting  4 days ago  Back Pain   This is a new problem  The current episode started in the past 7 days  The problem occurs constantly  The problem is unchanged  The pain is present in the lumbar spine  Pertinent negatives include no abdominal pain, chest pain, dysuria, fever, headaches, numbness or weakness  The treatment provided mild relief  Review of Systems   Review of Systems   Constitutional: Negative for activity change, appetite change, chills, fatigue and fever  HENT: Negative for congestion, ear pain, nosebleeds, rhinorrhea and sore throat  Eyes: Negative for photophobia, pain, redness and visual disturbance  Respiratory: Negative for cough, shortness of breath and wheezing  Cardiovascular: Negative  Negative for chest pain  Gastrointestinal: Negative  Negative for abdominal pain, constipation, diarrhea and vomiting  Endocrine: Negative  Genitourinary: Negative for difficulty urinating, dysuria and flank pain  Musculoskeletal: Positive for back pain  Skin: Negative for color change and rash  Neurological: Negative for dizziness, weakness, numbness and headaches  Hematological: Negative for adenopathy  Psychiatric/Behavioral: Negative for agitation and confusion   The patient is not nervous/anxious  PHQ-9 Depression Screening    PHQ-9:    Frequency of the following problems over the past two weeks:       Little interest or pleasure in doing things:  0 - not at all  Feeling down, depressed, or hopeless:  0 - not at all  PHQ-2 Score:  0        Current Medications       Current Outpatient Medications:     amLODIPine (NORVASC) 10 mg tablet, Take 10 mg by mouth daily, Disp: , Rfl:     NELLY ASPIRIN EC LOW DOSE PO, Take 81 mg by mouth daily, Disp: , Rfl:     fexofenadine (ALLEGRA ALLERGY) 180 MG tablet, Take 180 mg by mouth daily as needed, Disp: , Rfl:     glyBURIDE (DIABETA) 5 mg tablet, Take 5 mg by mouth 2 (two) times a day, Disp: , Rfl:     hydrochlorothiazide (HYDRODIURIL) 12 5 mg tablet, Take 12 5 mg by mouth daily, Disp: , Rfl:     Multiple Vitamins-Minerals (CENTRUM SILVER 50+MEN PO), Centrum Silver, Disp: , Rfl:     ONETOUCH DELICA LANCETS 25V MISC, TEST daily, Disp: , Rfl:     pioglitazone (ACTOS) 15 mg tablet, , Disp: , Rfl:     potassium chloride (K-DUR,KLOR-CON) 20 mEq tablet, Take 20 mEq by mouth daily, Disp: , Rfl:     Current Allergies     Allergies as of 10/22/2019    (No Known Allergies)            The following portions of the patient's history were reviewed and updated as appropriate: allergies, current medications, past family history, past medical history, past social history, past surgical history and problem list      Past Medical History:   Diagnosis Date    Diabetes mellitus ([facility] )     Disease of thyroid gland     Hypertension        Past Surgical History:   Procedure Laterality Date    APPENDECTOMY         History reviewed  No pertinent family history  Medications have been verified          Objective   /68   Pulse 72   Temp 100 °F (37 8 °C) (Tympanic)   Resp 18   Ht 5' 7" (1 702 m)   Wt 130 kg (286 lb)   SpO2 98%   BMI 44 79 kg/m²        Physical Exam     Physical Exam   Constitutional: He is oriented to person, place, and time  He appears well-developed and well-nourished  He is cooperative  He does not appear ill  No distress  Eyes: Lids are normal    Cardiovascular: Normal rate, regular rhythm, S1 normal, S2 normal, normal heart sounds and intact distal pulses  Exam reveals no gallop and no friction rub  No murmur heard  Pulmonary/Chest: Effort normal and breath sounds normal  No respiratory distress  He has no decreased breath sounds  He has no wheezes  Musculoskeletal: Normal range of motion  He exhibits no edema or deformity  Lumbar back: He exhibits tenderness  He exhibits normal range of motion, no bony tenderness, no swelling, no deformity, no pain and no spasm  Back:    +ttp  Able to ambulate without difficulty  Neurological: He is alert and oriented to person, place, and time  Skin: Skin is warm  No rash noted  No erythema  Psychiatric: He has a normal mood and affect  His behavior is normal  Thought content normal    Nursing note and vitals reviewed

## 2019-11-08 DIAGNOSIS — I10 ESSENTIAL HYPERTENSION: Primary | ICD-10-CM

## 2019-11-08 DIAGNOSIS — Z76.0 MEDICATION REFILL: ICD-10-CM

## 2019-11-08 RX ORDER — HYDROCHLOROTHIAZIDE 12.5 MG/1
12.5 TABLET ORAL DAILY
Qty: 90 TABLET | Refills: 1 | Status: SHIPPED | OUTPATIENT
Start: 2019-11-08 | End: 2019-11-11 | Stop reason: SDUPTHER

## 2019-11-08 RX ORDER — POTASSIUM CHLORIDE 20 MEQ/1
20 TABLET, EXTENDED RELEASE ORAL DAILY
Qty: 90 TABLET | Refills: 1 | Status: SHIPPED | OUTPATIENT
Start: 2019-11-08 | End: 2019-11-11 | Stop reason: SDUPTHER

## 2019-11-11 DIAGNOSIS — I10 ESSENTIAL HYPERTENSION: ICD-10-CM

## 2019-11-11 DIAGNOSIS — Z76.0 MEDICATION REFILL: ICD-10-CM

## 2019-11-11 RX ORDER — HYDROCHLOROTHIAZIDE 12.5 MG/1
12.5 TABLET ORAL DAILY
Qty: 90 TABLET | Refills: 1 | Status: SHIPPED | OUTPATIENT
Start: 2019-11-11 | End: 2020-02-06 | Stop reason: SDUPTHER

## 2019-11-11 RX ORDER — POTASSIUM CHLORIDE 20 MEQ/1
20 TABLET, EXTENDED RELEASE ORAL DAILY
Qty: 90 TABLET | Refills: 1 | Status: SHIPPED | OUTPATIENT
Start: 2019-11-11 | End: 2020-02-21 | Stop reason: SDUPTHER

## 2020-02-06 DIAGNOSIS — I10 ESSENTIAL HYPERTENSION: ICD-10-CM

## 2020-02-06 RX ORDER — HYDROCHLOROTHIAZIDE 12.5 MG/1
12.5 TABLET ORAL DAILY
Qty: 90 TABLET | Refills: 0 | Status: SHIPPED | OUTPATIENT
Start: 2020-02-06 | End: 2020-02-21 | Stop reason: SDUPTHER

## 2020-02-21 ENCOUNTER — OFFICE VISIT (OUTPATIENT)
Dept: FAMILY MEDICINE CLINIC | Facility: CLINIC | Age: 70
End: 2020-02-21
Payer: MEDICARE

## 2020-02-21 VITALS
OXYGEN SATURATION: 99 % | TEMPERATURE: 99.3 F | HEIGHT: 67 IN | WEIGHT: 289 LBS | BODY MASS INDEX: 45.36 KG/M2 | HEART RATE: 80 BPM | DIASTOLIC BLOOD PRESSURE: 78 MMHG | RESPIRATION RATE: 18 BRPM | SYSTOLIC BLOOD PRESSURE: 130 MMHG

## 2020-02-21 DIAGNOSIS — E11.9 TYPE 2 DIABETES MELLITUS WITHOUT COMPLICATION, WITH LONG-TERM CURRENT USE OF INSULIN (HCC): Primary | ICD-10-CM

## 2020-02-21 DIAGNOSIS — Z79.4 TYPE 2 DIABETES MELLITUS WITHOUT COMPLICATION, WITH LONG-TERM CURRENT USE OF INSULIN (HCC): Primary | ICD-10-CM

## 2020-02-21 DIAGNOSIS — E66.01 CLASS 3 SEVERE OBESITY DUE TO EXCESS CALORIES WITH SERIOUS COMORBIDITY AND BODY MASS INDEX (BMI) OF 40.0 TO 44.9 IN ADULT (HCC): ICD-10-CM

## 2020-02-21 DIAGNOSIS — I10 ESSENTIAL HYPERTENSION: ICD-10-CM

## 2020-02-21 DIAGNOSIS — N28.1 RENAL CYST, LEFT: ICD-10-CM

## 2020-02-21 DIAGNOSIS — E78.2 MIXED HYPERLIPIDEMIA: ICD-10-CM

## 2020-02-21 LAB — SL AMB POCT HEMOGLOBIN AIC: 6.5 (ref ?–6.5)

## 2020-02-21 PROCEDURE — 3044F HG A1C LEVEL LT 7.0%: CPT | Performed by: NURSE PRACTITIONER

## 2020-02-21 PROCEDURE — 83036 HEMOGLOBIN GLYCOSYLATED A1C: CPT | Performed by: NURSE PRACTITIONER

## 2020-02-21 PROCEDURE — 3008F BODY MASS INDEX DOCD: CPT | Performed by: NURSE PRACTITIONER

## 2020-02-21 PROCEDURE — 99214 OFFICE O/P EST MOD 30 MIN: CPT | Performed by: NURSE PRACTITIONER

## 2020-02-21 PROCEDURE — 3078F DIAST BP <80 MM HG: CPT | Performed by: NURSE PRACTITIONER

## 2020-02-21 PROCEDURE — 2022F DILAT RTA XM EVC RTNOPTHY: CPT | Performed by: NURSE PRACTITIONER

## 2020-02-21 PROCEDURE — 1036F TOBACCO NON-USER: CPT | Performed by: NURSE PRACTITIONER

## 2020-02-21 PROCEDURE — 3075F SYST BP GE 130 - 139MM HG: CPT | Performed by: NURSE PRACTITIONER

## 2020-02-21 PROCEDURE — 4040F PNEUMOC VAC/ADMIN/RCVD: CPT | Performed by: NURSE PRACTITIONER

## 2020-02-21 PROCEDURE — 4010F ACE/ARB THERAPY RXD/TAKEN: CPT | Performed by: NURSE PRACTITIONER

## 2020-02-21 PROCEDURE — 1160F RVW MEDS BY RX/DR IN RCRD: CPT | Performed by: NURSE PRACTITIONER

## 2020-02-21 RX ORDER — GLYBURIDE 5 MG/1
5 TABLET ORAL 2 TIMES DAILY
Qty: 180 TABLET | Refills: 1 | Status: SHIPPED | OUTPATIENT
Start: 2020-02-21 | End: 2020-06-19 | Stop reason: SDUPTHER

## 2020-02-21 RX ORDER — HYDROCHLOROTHIAZIDE 12.5 MG/1
12.5 TABLET ORAL DAILY
Qty: 90 TABLET | Refills: 1 | Status: SHIPPED | OUTPATIENT
Start: 2020-02-21 | End: 2020-06-19 | Stop reason: SDUPTHER

## 2020-02-21 RX ORDER — AMLODIPINE BESYLATE 10 MG/1
10 TABLET ORAL DAILY
Qty: 90 TABLET | Refills: 1 | Status: SHIPPED | OUTPATIENT
Start: 2020-02-21 | End: 2020-06-19 | Stop reason: SDUPTHER

## 2020-02-21 RX ORDER — POTASSIUM CHLORIDE 20 MEQ/1
20 TABLET, EXTENDED RELEASE ORAL DAILY
Qty: 90 TABLET | Refills: 1 | Status: SHIPPED | OUTPATIENT
Start: 2020-02-21 | End: 2020-06-19 | Stop reason: SDUPTHER

## 2020-02-21 RX ORDER — EZETIMIBE 10 MG/1
10 TABLET ORAL DAILY
Qty: 90 TABLET | Refills: 1 | Status: SHIPPED | OUTPATIENT
Start: 2020-02-21 | End: 2020-09-11 | Stop reason: SDUPTHER

## 2020-02-21 RX ORDER — PIOGLITAZONEHYDROCHLORIDE 15 MG/1
15 TABLET ORAL DAILY
Qty: 90 TABLET | Refills: 1 | Status: SHIPPED | OUTPATIENT
Start: 2020-02-21 | End: 2020-06-19 | Stop reason: SDUPTHER

## 2020-02-21 NOTE — PROGRESS NOTES
[facility] [facility]        NAME: Tono Goodson is a 71 y o  male  : 1950    MRN: [id number]  DATE: 2020  TIME: 3:22 PM    Assessment and Plan   Type 2 diabetes mellitus without complication, with long-term current use of insulin (HCC) [E11 9, Z79 4]  1  Type 2 diabetes mellitus without complication, with long-term current use of insulin (HCC)  POCT hemoglobin A1c    glyBURIDE (DIABETA) 5 mg tablet    pioglitazone (ACTOS) 15 mg tablet    CANCELED: HEMOGLOBIN A1C W/ EAG ESTIMATION   2  Class 3 severe obesity due to excess calories with serious comorbidity and body mass index (BMI) of 40 0 to 44 9 in adult ([facility] )     3  Renal cyst, left  Ambulatory referral to Nephrology    CANCELED: Ambulatory referral to Nephrology    CANCELED: MRI abdomen w wo contrast    yearly US, followed by Dr Aramis Hendrix   4  Essential hypertension  amLODIPine (NORVASC) 10 mg tablet    hydrochlorothiazide (HYDRODIURIL) 12 5 mg tablet    potassium chloride (K-DUR,KLOR-CON) 20 mEq tablet   5  Mixed hyperlipidemia  ezetimibe (ZETIA) 10 mg tablet         Patient Instructions     Patient Instructions   Referral given for Dr Araims Hendrix to f/u on MRI results from 2019  Get bloodwork last week as discussed  Continue same medications  Discussed starting a Statin if cholesterol is high  Call or return sooner than Medicare wellness if problems/concerns          Chief Complaint     Chief Complaint   Patient presents with    Follow-up     6 month follow up    Diabetes         History of Present Illness       medcheck today for DM, f/u US renal cyst and MRI report from   HgA1c today is 6 5      Review of Systems   Review of Systems   Constitutional: Negative for activity change, diaphoresis, fatigue and fever  HENT: Negative for congestion, facial swelling, hearing loss, rhinorrhea, sinus pressure, sinus pain, sneezing, sore throat and voice change  Eyes: Negative for discharge and visual disturbance  Respiratory: Negative for cough, choking, chest tightness, shortness of breath, wheezing and stridor  Cardiovascular: Negative for chest pain, palpitations and leg swelling  Gastrointestinal: Negative for abdominal distention, abdominal pain, constipation, diarrhea, nausea and vomiting  Endocrine: Negative for polydipsia, polyphagia and polyuria  Genitourinary: Negative for difficulty urinating, dysuria, frequency and urgency  Musculoskeletal: Negative for arthralgias, back pain, gait problem, joint swelling, myalgias, neck pain and neck stiffness  Skin: Negative for color change, rash and wound  Neurological: Negative for dizziness, syncope, speech difficulty, weakness, light-headedness and headaches  Hematological: Negative for adenopathy  Does not bruise/bleed easily  Psychiatric/Behavioral: Negative for agitation, behavioral problems, confusion, hallucinations, sleep disturbance and suicidal ideas  The patient is not nervous/anxious            Current Medications       Current Outpatient Medications:     amLODIPine (NORVASC) 10 mg tablet, Take 1 tablet (10 mg total) by mouth daily, Disp: 90 tablet, Rfl: 1    NELLY ASPIRIN EC LOW DOSE PO, Take 81 mg by mouth daily, Disp: , Rfl:     cyclobenzaprine (FLEXERIL) 10 mg tablet, Take 1 tablet (10 mg total) by mouth 3 (three) times a day as needed for muscle spasms, Disp: 30 tablet, Rfl: 0    fexofenadine (ALLEGRA ALLERGY) 180 MG tablet, Take 180 mg by mouth daily as needed, Disp: , Rfl:     glyBURIDE (DIABETA) 5 mg tablet, Take 1 tablet (5 mg total) by mouth 2 (two) times a day, Disp: 180 tablet, Rfl: 1    hydrochlorothiazide (HYDRODIURIL) 12 5 mg tablet, Take 1 tablet (12 5 mg total) by mouth daily, Disp: 90 tablet, Rfl: 1    Multiple Vitamins-Minerals (CENTRUM SILVER 50+MEN PO), Centrum Silver, Disp: , Rfl:     naproxen (NAPROSYN) 500 mg tablet, Take 1 tablet (500 mg total) by mouth 2 (two) times a day with meals, Disp: 30 tablet, Rfl: 0   ELISAАНДРЕЙERLIN HANSEN LANCETS 07M MISC, TEST daily, Disp: , Rfl:     pioglitazone (ACTOS) 15 mg tablet, Take 1 tablet (15 mg total) by mouth daily, Disp: 90 tablet, Rfl: 1    potassium chloride (K-DUR,KLOR-CON) 20 mEq tablet, Take 1 tablet (20 mEq total) by mouth daily, Disp: 90 tablet, Rfl: 1    ezetimibe (ZETIA) 10 mg tablet, Take 1 tablet (10 mg total) by mouth daily, Disp: 90 tablet, Rfl: 1    Current Allergies     Allergies as of 02/21/2020    (No Known Allergies)            The following portions of the patient's history were reviewed and updated as appropriate: allergies, current medications, past family history, past medical history, past social history, past surgical history and problem list      Past Medical History:   Diagnosis Date    Diabetes mellitus ([facility] )     Disease of thyroid gland     Hypertension        Past Surgical History:   Procedure Laterality Date    APPENDECTOMY         History reviewed  No pertinent family history  Medications have been verified  Objective   /78   Pulse 80   Temp 99 3 °F (37 4 °C)   Resp 18   Ht 5' 7" (1 702 m)   Wt 131 kg (289 lb)   SpO2 99%   BMI 45 26 kg/m²        Physical Exam     Physical Exam   Constitutional: He is oriented to person, place, and time  He appears well-developed and well-nourished  No distress  Cardiovascular: Normal rate, regular rhythm and normal heart sounds  No murmur heard  Pulmonary/Chest: Effort normal and breath sounds normal  No respiratory distress  He has no wheezes  Abdominal: He exhibits distension  Musculoskeletal: Normal range of motion  He exhibits no edema, tenderness or deformity  Neurological: He is alert and oriented to person, place, and time  Skin: Skin is warm and dry  He is not diaphoretic  Psychiatric: He has a normal mood and affect  His behavior is normal  Judgment and thought content normal    Nursing note and vitals reviewed

## 2020-02-21 NOTE — PATIENT INSTRUCTIONS
Referral given for Dr Tierney Loya to f/u on MRI results from 9/2019  Get bloodwork last week as discussed  Continue same medications  Discussed starting a Statin if cholesterol is high  Call or return sooner than Medicare wellness if problems/concerns

## 2020-02-24 ENCOUNTER — APPOINTMENT (OUTPATIENT)
Dept: LAB | Facility: CLINIC | Age: 70
End: 2020-02-24
Payer: MEDICARE

## 2020-02-24 DIAGNOSIS — E78.00 HYPERCHOLESTEREMIA: ICD-10-CM

## 2020-02-24 DIAGNOSIS — E13.8 DIABETES MELLITUS OF OTHER TYPE WITH COMPLICATION, UNSPECIFIED WHETHER LONG TERM INSULIN USE: ICD-10-CM

## 2020-02-24 DIAGNOSIS — Z12.5 SCREENING PSA (PROSTATE SPECIFIC ANTIGEN): ICD-10-CM

## 2020-02-24 LAB
ALBUMIN SERPL BCP-MCNC: 3.7 G/DL (ref 3.5–5)
ALP SERPL-CCNC: 86 U/L (ref 46–116)
ALT SERPL W P-5'-P-CCNC: 46 U/L (ref 12–78)
ANION GAP SERPL CALCULATED.3IONS-SCNC: 4 MMOL/L (ref 4–13)
AST SERPL W P-5'-P-CCNC: 24 U/L (ref 5–45)
BASOPHILS # BLD AUTO: 0.08 THOUSANDS/ΜL (ref 0–0.1)
BASOPHILS NFR BLD AUTO: 1 % (ref 0–1)
BILIRUB SERPL-MCNC: 0.53 MG/DL (ref 0.2–1)
BUN SERPL-MCNC: 16 MG/DL (ref 5–25)
CALCIUM SERPL-MCNC: 9.1 MG/DL (ref 8.3–10.1)
CHLORIDE SERPL-SCNC: 109 MMOL/L (ref 100–108)
CHOLEST SERPL-MCNC: 157 MG/DL (ref 50–200)
CO2 SERPL-SCNC: 28 MMOL/L (ref 21–32)
CREAT SERPL-MCNC: 1.02 MG/DL (ref 0.6–1.3)
EOSINOPHIL # BLD AUTO: 0.43 THOUSAND/ΜL (ref 0–0.61)
EOSINOPHIL NFR BLD AUTO: 4 % (ref 0–6)
ERYTHROCYTE [DISTWIDTH] IN BLOOD BY AUTOMATED COUNT: 12.7 % (ref 11.6–15.1)
GFR SERPL CREATININE-BSD FRML MDRD: 75 ML/MIN/1.73SQ M
GLUCOSE P FAST SERPL-MCNC: 143 MG/DL (ref 65–99)
HCT VFR BLD AUTO: 44.1 % (ref 36.5–49.3)
HDLC SERPL-MCNC: 34 MG/DL
HGB BLD-MCNC: 14.8 G/DL (ref 12–17)
IMM GRANULOCYTES # BLD AUTO: 0.04 THOUSAND/UL (ref 0–0.2)
IMM GRANULOCYTES NFR BLD AUTO: 0 % (ref 0–2)
LDLC SERPL CALC-MCNC: 97 MG/DL (ref 0–100)
LYMPHOCYTES # BLD AUTO: 2.48 THOUSANDS/ΜL (ref 0.6–4.47)
LYMPHOCYTES NFR BLD AUTO: 26 % (ref 14–44)
MCH RBC QN AUTO: 30.2 PG (ref 26.8–34.3)
MCHC RBC AUTO-ENTMCNC: 33.6 G/DL (ref 31.4–37.4)
MCV RBC AUTO: 90 FL (ref 82–98)
MONOCYTES # BLD AUTO: 0.92 THOUSAND/ΜL (ref 0.17–1.22)
MONOCYTES NFR BLD AUTO: 10 % (ref 4–12)
NEUTROPHILS # BLD AUTO: 5.76 THOUSANDS/ΜL (ref 1.85–7.62)
NEUTS SEG NFR BLD AUTO: 59 % (ref 43–75)
NONHDLC SERPL-MCNC: 123 MG/DL
NRBC BLD AUTO-RTO: 0 /100 WBCS
PLATELET # BLD AUTO: 233 THOUSANDS/UL (ref 149–390)
PMV BLD AUTO: 10.9 FL (ref 8.9–12.7)
POTASSIUM SERPL-SCNC: 4 MMOL/L (ref 3.5–5.3)
PROT SERPL-MCNC: 7.4 G/DL (ref 6.4–8.2)
RBC # BLD AUTO: 4.9 MILLION/UL (ref 3.88–5.62)
SODIUM SERPL-SCNC: 141 MMOL/L (ref 136–145)
TRIGL SERPL-MCNC: 129 MG/DL
WBC # BLD AUTO: 9.71 THOUSAND/UL (ref 4.31–10.16)

## 2020-02-24 PROCEDURE — 80061 LIPID PANEL: CPT

## 2020-02-24 PROCEDURE — 36415 COLL VENOUS BLD VENIPUNCTURE: CPT

## 2020-02-24 PROCEDURE — 85025 COMPLETE CBC W/AUTO DIFF WBC: CPT

## 2020-02-24 PROCEDURE — 84153 ASSAY OF PSA TOTAL: CPT

## 2020-02-24 PROCEDURE — 84154 ASSAY OF PSA FREE: CPT

## 2020-02-24 PROCEDURE — 80053 COMPREHEN METABOLIC PANEL: CPT

## 2020-02-25 LAB
PSA FREE MFR SERPL: 56 %
PSA FREE SERPL-MCNC: 0.28 NG/ML
PSA SERPL-MCNC: 0.5 NG/ML (ref 0–4)

## 2020-03-19 ENCOUNTER — CONSULT (OUTPATIENT)
Dept: NEPHROLOGY | Facility: CLINIC | Age: 70
End: 2020-03-19
Payer: MEDICARE

## 2020-03-19 VITALS
BODY MASS INDEX: 45.67 KG/M2 | OXYGEN SATURATION: 95 % | DIASTOLIC BLOOD PRESSURE: 82 MMHG | HEIGHT: 67 IN | WEIGHT: 291 LBS | HEART RATE: 77 BPM | SYSTOLIC BLOOD PRESSURE: 140 MMHG

## 2020-03-19 DIAGNOSIS — E26.9 HYPERALDOSTERONISM (HCC): Primary | ICD-10-CM

## 2020-03-19 DIAGNOSIS — N28.1 RENAL CYST, LEFT: ICD-10-CM

## 2020-03-19 DIAGNOSIS — I10 ESSENTIAL HYPERTENSION: ICD-10-CM

## 2020-03-19 DIAGNOSIS — R60.9 EDEMA, UNSPECIFIED TYPE: ICD-10-CM

## 2020-03-19 PROCEDURE — 2022F DILAT RTA XM EVC RTNOPTHY: CPT | Performed by: INTERNAL MEDICINE

## 2020-03-19 PROCEDURE — 3077F SYST BP >= 140 MM HG: CPT | Performed by: INTERNAL MEDICINE

## 2020-03-19 PROCEDURE — 4010F ACE/ARB THERAPY RXD/TAKEN: CPT | Performed by: INTERNAL MEDICINE

## 2020-03-19 PROCEDURE — 4040F PNEUMOC VAC/ADMIN/RCVD: CPT | Performed by: INTERNAL MEDICINE

## 2020-03-19 PROCEDURE — 3044F HG A1C LEVEL LT 7.0%: CPT | Performed by: INTERNAL MEDICINE

## 2020-03-19 PROCEDURE — 1160F RVW MEDS BY RX/DR IN RCRD: CPT | Performed by: INTERNAL MEDICINE

## 2020-03-19 PROCEDURE — 3008F BODY MASS INDEX DOCD: CPT | Performed by: INTERNAL MEDICINE

## 2020-03-19 PROCEDURE — 99204 OFFICE O/P NEW MOD 45 MIN: CPT | Performed by: INTERNAL MEDICINE

## 2020-03-19 PROCEDURE — 1036F TOBACCO NON-USER: CPT | Performed by: INTERNAL MEDICINE

## 2020-03-19 PROCEDURE — 3079F DIAST BP 80-89 MM HG: CPT | Performed by: INTERNAL MEDICINE

## 2020-03-19 RX ORDER — VALSARTAN 160 MG/1
TABLET ORAL
COMMUNITY
Start: 2020-01-26 | End: 2020-06-19 | Stop reason: SDUPTHER

## 2020-03-19 NOTE — ASSESSMENT & PLAN NOTE
Will arrange for the patient have a renal ultrasound in September of 2020  Will evaluate to see if any cysts have changed specifically the proteinaceous cyst on the left

## 2020-03-19 NOTE — ASSESSMENT & PLAN NOTE
Patient does not add salt to his diet, however, does enjoy a high sodium foods  We discussed at length improving this and hopefully improving lower extremity edema  In addition, the patient is on amlodipine which may be contributing to lower extremity edema  If the patient comes back positive for hyperaldosteronism will look to reduce and potentially discontinue amlodipine altogether once spironolactone is initiated

## 2020-03-19 NOTE — PROGRESS NOTES
[facility] [address]    Name: Lisette Kim  YOB: 1950      Assessment/Plan:    Renal cyst, left    Will arrange for the patient have a renal ultrasound in September of 2020  Will evaluate to see if any cysts have changed specifically the proteinaceous cyst on the left  Hypertension    Blood pressure well controlled this time  We discussed transitioning to a low-sodium diet  Please refer further below  Will work the patient for hyperaldosteronism  It is rising that he requires 20 mEq of potassium chloride once a day to maintain potassium levels  He may have an underlying hyperaldosterone state which could be better dressed with spironolactone  Edema    Patient does not add salt to his diet, however, does enjoy a high sodium foods  We discussed at length improving this and hopefully improving lower extremity edema  In addition, the patient is on amlodipine which may be contributing to lower extremity edema  If the patient comes back positive for hyperaldosteronism will look to reduce and potentially discontinue amlodipine altogether once spironolactone is initiated  Problem List Items Addressed This Visit        Cardiovascular and Mediastinum    Hypertension       Blood pressure well controlled this time  We discussed transitioning to a low-sodium diet  Please refer further below  Will work the patient for hyperaldosteronism  It is rising that he requires 20 mEq of potassium chloride once a day to maintain potassium levels  He may have an underlying hyperaldosterone state which could be better dressed with spironolactone  Relevant Medications    valsartan (DIOVAN) 160 mg tablet       Genitourinary    Renal cyst, left       Will arrange for the patient have a renal ultrasound in September of 2020  Will evaluate to see if any cysts have changed specifically the proteinaceous cyst on the left           Relevant Orders US retroperitoneal complete       Other    Edema       Patient does not add salt to his diet, however, does enjoy a high sodium foods  We discussed at length improving this and hopefully improving lower extremity edema  In addition, the patient is on amlodipine which may be contributing to lower extremity edema  If the patient comes back positive for hyperaldosteronism will look to reduce and potentially discontinue amlodipine altogether once spironolactone is initiated  Other Visit Diagnoses     Hyperaldosteronism ([facility] [address] )    -  Primary    Relevant Orders    Aldosterone/Renin Ratio            Patient doing well at this time  Will see him back in the fall of 2021  Repeat renal ultrasound to occur at that time  In the meantime the patient will have an aldosterone /renin level checked with next set of labs  Subjective:      Patient ID: Philly Burris is a 71 y o  male  Reviewed the patient's MRI September 2019 was reviewed, cycsts were noted as benign bilaterally with one noted as proteinaceous  Hypertension   This is a chronic problem  The current episode started more than 1 year ago  The problem is unchanged  The problem is controlled  Pertinent negatives include no chest pain or orthopnea  There are no associated agents to hypertension  Risk factors for coronary artery disease include diabetes mellitus, male gender and obesity  Past treatments include angiotensin blockers, calcium channel blockers and diuretics  Compliance problems include diet  There is no history of kidney disease  The following portions of the patient's history were reviewed and updated as appropriate: allergies, current medications, past family history, past medical history, past social history, past surgical history and problem list     Review of Systems   Cardiovascular: Negative for chest pain and orthopnea  All other systems reviewed and are negative          Social History     Socioeconomic History    Marital status: /Civil Union     Spouse name: None    Number of children: None    Years of education: None    Highest education level: None   Occupational History    Occupation: [employer]  49  in steady days    Social Needs    Financial resource strain: None    Food insecurity:     Worry: None     Inability: None    Transportation needs:     Medical: None     Non-medical: None   Tobacco Use    Smoking status: Never Smoker    Smokeless tobacco: Never Used    Tobacco comment: Former smoker - As per [employer]    Substance and Sexual Activity    Alcohol use: Yes     Comment: Rarely     Drug use: Never    Sexual activity: None   Lifestyle    Physical activity:     Days per week: None     Minutes per session: None    Stress: None   Relationships    Social connections:     Talks on phone: None     Gets together: None     Attends Mandaeism service: None     Active member of club or organization: None     Attends meetings of clubs or organizations: None     Relationship status: None    Intimate partner violence:     Fear of current or ex partner: None     Emotionally abused: None     Physically abused: None     Forced sexual activity: None   Other Topics Concern    None   Social History Narrative    Consumes on average 3 cups of regular coffee per day      Past Medical History:   Diagnosis Date    Abnormal liver function tests     Abnormal weight gain     Atopic dermatitis     Benign essential hypertension     Carotid artery occlusion     Diabetes mellitus ([facility] [address] )     Disease of thyroid gland     Essential hypertension     Gallstone     Hypertension     Localized primary osteoarthritis     Malaise and fatigue     Mixed hyperlipidemia     Morbid obesity with body mass index (BMI) of 40 0 or higher ([email or web address]     Obesity with body mass index greater than 30     Peripheral vascular disease ([email or web address]     Sciatica     Type 2 diabetes mellitus ([email or web address]     Type II diabetes mellitus, uncontrolled ([facility] )     Unspecified osteoarthritis, unspecified site     Weight decreased      Past Surgical History:   Procedure Laterality Date    APPENDECTOMY      CARPAL TUNNEL RELEASE      CHOLECYSTECTOMY      COLONOSCOPY  2014    12 polyps     JOINT REPLACEMENT Left     TKR    JOINT REPLACEMENT Right     Total knee replacement     SHOULDER SURGERY Left     repair        Current Outpatient Medications:     amLODIPine (NORVASC) 10 mg tablet, Take 1 tablet (10 mg total) by mouth daily, Disp: 90 tablet, Rfl: 1    NELLY ASPIRIN EC LOW DOSE PO, Take 81 mg by mouth daily, Disp: , Rfl:     ezetimibe (ZETIA) 10 mg tablet, Take 1 tablet (10 mg total) by mouth daily, Disp: 90 tablet, Rfl: 1    fexofenadine (ALLEGRA ALLERGY) 180 MG tablet, Take 180 mg by mouth daily as needed, Disp: , Rfl:     glyBURIDE (DIABETA) 5 mg tablet, Take 1 tablet (5 mg total) by mouth 2 (two) times a day, Disp: 180 tablet, Rfl: 1    hydrochlorothiazide (HYDRODIURIL) 12 5 mg tablet, Take 1 tablet (12 5 mg total) by mouth daily, Disp: 90 tablet, Rfl: 1    Multiple Vitamins-Minerals (CENTRUM SILVER 50+MEN PO), Centrum Silver, Disp: , Rfl:     ONETOUCH DELICA LANCETS 16Z MISC, TEST daily, Disp: , Rfl:     pioglitazone (ACTOS) 15 mg tablet, Take 1 tablet (15 mg total) by mouth daily, Disp: 90 tablet, Rfl: 1    potassium chloride (K-DUR,KLOR-CON) 20 mEq tablet, Take 1 tablet (20 mEq total) by mouth daily, Disp: 90 tablet, Rfl: 1    valsartan (DIOVAN) 160 mg tablet, , Disp: , Rfl:     Lab Results   Component Value Date     05/02/2018    SODIUM 141 02/24/2020    K 4 0 02/24/2020     (H) 02/24/2020    CO2 28 02/24/2020    ANIONGAP 11 1 05/02/2018    AGAP 4 02/24/2020    BUN 16 02/24/2020    CREATININE 1 02 02/24/2020    GLUF 143 (H) 02/24/2020    CALCIUM 9 1 02/24/2020    AST 24 02/24/2020    ALT 46 02/24/2020    ALKPHOS 86 02/24/2020    PROT 6 7 05/02/2018    TP 7 4 02/24/2020    BILITOT 0 4 05/02/2018    TBILI 0 53 02/24/2020    EGFR 75 02/24/2020     Lab Results   Component Value Date    WBC 9 71 02/24/2020    HGB 14 8 02/24/2020    HCT 44 1 02/24/2020    MCV 90 02/24/2020     02/24/2020     Lab Results   Component Value Date    CHOLESTEROL 157 02/24/2020    CHOLESTEROL 162 03/19/2019     Lab Results   Component Value Date    HDL 34 (L) 02/24/2020    HDL 34 (L) 03/19/2019    HDL 40 05/02/2018     Lab Results   Component Value Date    LDLCALC 97 02/24/2020    LDLCALC 94 03/19/2019    LDLCALC 112 6 05/02/2018     Lab Results   Component Value Date    TRIG 129 02/24/2020    TRIG 170 (H) 03/19/2019    TRIG 126 05/02/2018     No results found for: CHOLHDL  No results found for: [id number], TSH  Lab Results   Component Value Date    CALCIUM 9 1 02/24/2020     No results found for: SPEP, UPEP  No results found for: MICROALBUR, [id number]        Objective:      /82 (BP Location: Right arm, Patient Position: Sitting, Cuff Size: Large)   Pulse 77   Ht 5' 7" (1 702 m)   Wt 132 kg (291 lb)   SpO2 95%   BMI 45 58 kg/m²          Physical Exam   Constitutional: He is oriented to person, place, and time  He appears well-developed and well-nourished  No distress  HENT:   Head: Normocephalic and atraumatic  Eyes: Conjunctivae are normal    Neck: Neck supple  Cardiovascular: Normal rate and regular rhythm  Pulmonary/Chest: Effort normal and breath sounds normal    Abdominal: Soft  Musculoskeletal: He exhibits edema (Plus two bilateral lower extremity edema)  Neurological: He is alert and oriented to person, place, and time  No cranial nerve deficit  Skin: Skin is warm  No rash noted  Psychiatric: He has a normal mood and affect   His behavior is normal

## 2020-03-19 NOTE — ASSESSMENT & PLAN NOTE
Blood pressure well controlled this time  We discussed transitioning to a low-sodium diet  Please refer further below  Will work the patient for hyperaldosteronism  It is rising that he requires 20 mEq of potassium chloride once a day to maintain potassium levels  He may have an underlying hyperaldosterone state which could be better dressed with spironolactone

## 2020-06-19 ENCOUNTER — CLINICAL SUPPORT (OUTPATIENT)
Dept: FAMILY MEDICINE CLINIC | Facility: CLINIC | Age: 70
End: 2020-06-19
Payer: MEDICARE

## 2020-06-19 VITALS
BODY MASS INDEX: 43.95 KG/M2 | HEIGHT: 67 IN | TEMPERATURE: 97.6 F | HEART RATE: 75 BPM | DIASTOLIC BLOOD PRESSURE: 70 MMHG | WEIGHT: 280 LBS | SYSTOLIC BLOOD PRESSURE: 136 MMHG | OXYGEN SATURATION: 98 %

## 2020-06-19 DIAGNOSIS — I10 ESSENTIAL HYPERTENSION: ICD-10-CM

## 2020-06-19 DIAGNOSIS — E11.9 TYPE 2 DIABETES MELLITUS WITHOUT COMPLICATION, WITH LONG-TERM CURRENT USE OF INSULIN (HCC): ICD-10-CM

## 2020-06-19 DIAGNOSIS — I10 ESSENTIAL HYPERTENSION: Primary | ICD-10-CM

## 2020-06-19 DIAGNOSIS — Z79.4 TYPE 2 DIABETES MELLITUS WITHOUT COMPLICATION, WITH LONG-TERM CURRENT USE OF INSULIN (HCC): ICD-10-CM

## 2020-06-19 PROCEDURE — 3078F DIAST BP <80 MM HG: CPT

## 2020-06-19 PROCEDURE — 99211 OFF/OP EST MAY X REQ PHY/QHP: CPT

## 2020-06-19 PROCEDURE — 3075F SYST BP GE 130 - 139MM HG: CPT

## 2020-06-19 PROCEDURE — 4040F PNEUMOC VAC/ADMIN/RCVD: CPT

## 2020-06-19 PROCEDURE — 3044F HG A1C LEVEL LT 7.0%: CPT

## 2020-06-19 PROCEDURE — 2022F DILAT RTA XM EVC RTNOPTHY: CPT

## 2020-06-19 PROCEDURE — 3008F BODY MASS INDEX DOCD: CPT

## 2020-06-19 RX ORDER — AMLODIPINE BESYLATE 10 MG/1
10 TABLET ORAL DAILY
Qty: 90 TABLET | Refills: 1 | Status: SHIPPED | OUTPATIENT
Start: 2020-06-19 | End: 2021-03-29 | Stop reason: SDUPTHER

## 2020-06-19 RX ORDER — VALSARTAN 160 MG/1
160 TABLET ORAL DAILY
Qty: 90 TABLET | Refills: 1 | Status: SHIPPED | OUTPATIENT
Start: 2020-06-19 | End: 2021-02-26 | Stop reason: SDUPTHER

## 2020-06-19 RX ORDER — PIOGLITAZONEHYDROCHLORIDE 15 MG/1
15 TABLET ORAL DAILY
Qty: 90 TABLET | Refills: 1 | Status: SHIPPED | OUTPATIENT
Start: 2020-06-19 | End: 2020-08-21 | Stop reason: SDUPTHER

## 2020-06-19 RX ORDER — HYDROCHLOROTHIAZIDE 12.5 MG/1
12.5 TABLET ORAL DAILY
Qty: 90 TABLET | Refills: 1 | Status: SHIPPED | OUTPATIENT
Start: 2020-06-19 | End: 2021-02-12 | Stop reason: SDUPTHER

## 2020-06-19 RX ORDER — GLYBURIDE 5 MG/1
5 TABLET ORAL 2 TIMES DAILY
Qty: 180 TABLET | Refills: 1 | Status: SHIPPED | OUTPATIENT
Start: 2020-06-19 | End: 2020-08-25 | Stop reason: SDUPTHER

## 2020-06-19 RX ORDER — POTASSIUM CHLORIDE 20 MEQ/1
20 TABLET, EXTENDED RELEASE ORAL DAILY
Qty: 90 TABLET | Refills: 1 | Status: SHIPPED | OUTPATIENT
Start: 2020-06-19 | End: 2020-11-27 | Stop reason: SDUPTHER

## 2020-08-21 DIAGNOSIS — Z79.4 TYPE 2 DIABETES MELLITUS WITHOUT COMPLICATION, WITH LONG-TERM CURRENT USE OF INSULIN (HCC): ICD-10-CM

## 2020-08-21 DIAGNOSIS — E11.9 TYPE 2 DIABETES MELLITUS WITHOUT COMPLICATION, WITH LONG-TERM CURRENT USE OF INSULIN (HCC): ICD-10-CM

## 2020-08-24 RX ORDER — PIOGLITAZONEHYDROCHLORIDE 15 MG/1
15 TABLET ORAL DAILY
Qty: 90 TABLET | Refills: 1 | Status: SHIPPED | OUTPATIENT
Start: 2020-08-24 | End: 2020-10-16 | Stop reason: SDUPTHER

## 2020-08-25 DIAGNOSIS — E11.9 TYPE 2 DIABETES MELLITUS WITHOUT COMPLICATION, WITH LONG-TERM CURRENT USE OF INSULIN (HCC): ICD-10-CM

## 2020-08-25 DIAGNOSIS — Z79.4 TYPE 2 DIABETES MELLITUS WITHOUT COMPLICATION, WITH LONG-TERM CURRENT USE OF INSULIN (HCC): ICD-10-CM

## 2020-08-25 RX ORDER — GLYBURIDE 5 MG/1
5 TABLET ORAL 2 TIMES DAILY
Qty: 180 TABLET | Refills: 1 | Status: SHIPPED | OUTPATIENT
Start: 2020-08-25 | End: 2020-10-09 | Stop reason: SDUPTHER

## 2020-09-11 DIAGNOSIS — E78.2 MIXED HYPERLIPIDEMIA: ICD-10-CM

## 2020-09-11 RX ORDER — EZETIMIBE 10 MG/1
10 TABLET ORAL DAILY
Qty: 90 TABLET | Refills: 1 | Status: SHIPPED | OUTPATIENT
Start: 2020-09-11 | End: 2021-03-29 | Stop reason: SDUPTHER

## 2020-09-11 NOTE — TELEPHONE ENCOUNTER
She should use the specific good RX for this medication at [address]- the cost is much better than just using that card  Can you print this for her?

## 2020-09-11 NOTE — TELEPHONE ENCOUNTER
Patients wife called into the office stating Zetia is 200 for a three month supply  they're asking if there is a medication that is more affordable   Please advise

## 2020-09-11 NOTE — TELEPHONE ENCOUNTER
aylin printed and given to doreen, she will fax over to [phone] and call the pharmacy to make sure shes received

## 2020-09-11 NOTE — TELEPHONE ENCOUNTER
Selam at [facility] has a 3 month supply of his dose for $28  Please offer this to them, there is no other replacement for this medication

## 2020-09-25 ENCOUNTER — OFFICE VISIT (OUTPATIENT)
Dept: FAMILY MEDICINE CLINIC | Facility: CLINIC | Age: 70
End: 2020-09-25
Payer: MEDICARE

## 2020-09-25 VITALS
OXYGEN SATURATION: 100 % | WEIGHT: 286 LBS | HEIGHT: 67 IN | SYSTOLIC BLOOD PRESSURE: 128 MMHG | HEART RATE: 71 BPM | RESPIRATION RATE: 20 BRPM | TEMPERATURE: 98.1 F | BODY MASS INDEX: 44.89 KG/M2 | DIASTOLIC BLOOD PRESSURE: 74 MMHG

## 2020-09-25 DIAGNOSIS — E78.2 MIXED HYPERLIPIDEMIA: ICD-10-CM

## 2020-09-25 DIAGNOSIS — E11.9 ENCOUNTER FOR DIABETIC FOOT EXAM (HCC): ICD-10-CM

## 2020-09-25 DIAGNOSIS — Z00.00 ENCOUNTER FOR MEDICARE ANNUAL WELLNESS EXAM: ICD-10-CM

## 2020-09-25 DIAGNOSIS — E66.01 CLASS 3 SEVERE OBESITY DUE TO EXCESS CALORIES WITH SERIOUS COMORBIDITY AND BODY MASS INDEX (BMI) OF 40.0 TO 44.9 IN ADULT (HCC): ICD-10-CM

## 2020-09-25 DIAGNOSIS — E11.9 TYPE 2 DIABETES MELLITUS WITHOUT COMPLICATION, WITH LONG-TERM CURRENT USE OF INSULIN (HCC): Primary | ICD-10-CM

## 2020-09-25 DIAGNOSIS — Z79.4 TYPE 2 DIABETES MELLITUS WITHOUT COMPLICATION, WITH LONG-TERM CURRENT USE OF INSULIN (HCC): Primary | ICD-10-CM

## 2020-09-25 DIAGNOSIS — Z23 NEED FOR VACCINATION: ICD-10-CM

## 2020-09-25 LAB — SL AMB POCT HEMOGLOBIN AIC: 6.3 (ref ?–6.5)

## 2020-09-25 PROCEDURE — 90662 IIV NO PRSV INCREASED AG IM: CPT

## 2020-09-25 PROCEDURE — G0008 ADMIN INFLUENZA VIRUS VAC: HCPCS

## 2020-09-25 PROCEDURE — 83036 HEMOGLOBIN GLYCOSYLATED A1C: CPT | Performed by: NURSE PRACTITIONER

## 2020-09-25 PROCEDURE — 99214 OFFICE O/P EST MOD 30 MIN: CPT | Performed by: NURSE PRACTITIONER

## 2020-09-25 PROCEDURE — G0439 PPPS, SUBSEQ VISIT: HCPCS | Performed by: NURSE PRACTITIONER

## 2020-09-25 NOTE — PATIENT INSTRUCTIONS

## 2020-09-25 NOTE — PROGRESS NOTES
[address] [place] [address] Primary Care        NAME: Lilian Thorne is a 79 y o  male  : 1950    MRN: [id number]  DATE: 2020  TIME: 11:47 AM    Assessment and Plan   Type 2 diabetes mellitus without complication, with long-term current use of insulin ([facility] ) [E11 9, Z79 4]  1  Type 2 diabetes mellitus without complication, with long-term current use of insulin ([facility])  POCT hemoglobin A1c    Comprehensive metabolic panel    Microalbumin / creatinine urine ratio   2  Mixed hyperlipidemia  Lipid panel   3  Class 3 severe obesity due to excess calories with serious comorbidity and body mass index (BMI) of 40 0 to 44 9 in adult [facility])  Ambulatory referral to Weight Management   4  Need for vaccination  influenza vaccine, high-dose, PF 0 7 mL (FLUZONE HIGH-DOSE)   5  Encounter for Medicare annual wellness exam     6  Encounter for diabetic foot exam [facility])           Patient Instructions     Patient Instructions       Medicare Preventive Visit Patient Instructions  Thank you for completing your Welcome to Medicare Visit or Medicare Annual Wellness Visit today  Your next wellness visit will be due in one year (2021)  The screening/preventive services that you may require over the next 5-10 years are detailed below  Some tests may not apply to you based off risk factors and/or age  Screening tests ordered at today's visit but not completed yet may show as past due  Also, please note that scanned in results may not display below    Preventive Screenings:  Service Recommendations Previous Testing/Comments   Colorectal Cancer Screening  · Colonoscopy    · Fecal Occult Blood Test (FOBT)/Fecal Immunochemical Test (FIT)  · Fecal DNA/Cologuard Test  · Flexible Sigmoidoscopy Age: 54-65 years old   Colonoscopy: every 10 years (May be performed more frequently if at higher risk)  OR  FOBT/FIT: every 1 year  OR  Cologuard: every 3 years  OR  Sigmoidoscopy: every 5 years  Screening may be recommended earlier than age 48 if at higher risk for colorectal cancer  Also, an individualized decision between you and your healthcare provider will decide whether screening between the ages of 74-80 would be appropriate  Colonoscopy: 05/14/2019  FOBT/FIT: Not on file  Cologuard: Not on file  Sigmoidoscopy: Not on file    Screening Current     Prostate Cancer Screening Individualized decision between patient and health care provider in men between ages of 53-78   Medicare will cover every 12 months beginning on the day after your 50th birthday PSA: 0 5 ng/mL     Screening Current     Hepatitis C Screening Once for adults born between 1945 and 1965  More frequently in patients at high risk for Hepatitis C Hep C Antibody: 08/24/2019    Screening Current   Diabetes Screening 1-2 times per year if you're at risk for diabetes or have pre-diabetes Fasting glucose: 143 mg/dL   A1C: 6 5    Screening Not Indicated  History Diabetes   Cholesterol Screening Once every 5 years if you don't have a lipid disorder  May order more often based on risk factors  Lipid panel: 02/24/2020    Screening Not Indicated  History Lipid Disorder      Other Preventive Screenings Covered by Medicare:  1  Abdominal Aortic Aneurysm (AAA) Screening: covered once if your at risk  You're considered to be at risk if you have a family history of AAA or a male between the age of 73-68 who smoking at least 100 cigarettes in your lifetime  2  Lung Cancer Screening: covers low dose CT scan once per year if you meet all of the following conditions: (1) Age 50-69; (2) No signs or symptoms of lung cancer; (3) Current smoker or have quit smoking within the last 15 years; (4) You have a tobacco smoking history of at least 30 pack years (packs per day x number of years you smoked); (5) You get a written order from a healthcare provider    3  Glaucoma Screening: covered annually if you're considered high risk: (1) You have diabetes OR (2) Family history of glaucoma OR (3)  aged 48 and older OR (3)  American aged 72 and older  3  Osteoporosis Screening: covered every 2 years if you meet one of the following conditions: (1) Have a vertebral abnormality; (2) On glucocorticoid therapy for more than 3 months; (3) Have primary hyperparathyroidism; (4) On osteoporosis medications and need to assess response to drug therapy  5  HIV Screening: covered annually if you're between the age of 12-76  Also covered annually if you are younger than 13 and older than 72 with risk factors for HIV infection  For pregnant patients, it is covered up to 3 times per pregnancy  Immunizations:  Immunization Recommendations   Influenza Vaccine Annual influenza vaccination during flu season is recommended for all persons aged >= 6 months who do not have contraindications   Pneumococcal Vaccine (Prevnar and Pneumovax)  * Prevnar = PCV13  * Pneumovax = PPSV23 Adults 25-60 years old: 1-3 doses may be recommended based on certain risk factors  Adults 72 years old: Prevnar (PCV13) vaccine recommended followed by Pneumovax (PPSV23) vaccine  If already received PPSV23 since turning 65, then PCV13 recommended at least one year after PPSV23 dose  Hepatitis B Vaccine 3 dose series if at intermediate or high risk (ex: diabetes, end stage renal disease, liver disease)   Tetanus (Td) Vaccine - COST NOT COVERED BY MEDICARE PART B Following completion of primary series, a booster dose should be given every 10 years to maintain immunity against tetanus  Td may also be given as tetanus wound prophylaxis  Tdap Vaccine - COST NOT COVERED BY MEDICARE PART B Recommended at least once for all adults  For pregnant patients, recommended with each pregnancy     Shingles Vaccine (Shingrix) - COST NOT COVERED BY MEDICARE PART B  2 shot series recommended in those aged 48 and above     Health Maintenance Due:      Topic Date Due    Hepatitis C Screening  Completed     Immunizations Due:      Topic Date Due    Influenza Vaccine  07/01/2020     Advance Directives   What are advance directives? Advance directives are legal documents that state your wishes and plans for medical care  These plans are made ahead of time in case you lose your ability to make decisions for yourself  Advance directives can apply to any medical decision, such as the treatments you want, and if you want to donate organs  What are the types of advance directives? There are many types of advance directives, and each state has rules about how to use them  You may choose a combination of any of the following:  · Living will: This is a written record of the treatment you want  You can also choose which treatments you do not want, which to limit, and which to stop at a certain time  This includes surgery, medicine, IV fluid, and tube feedings  · Durable power of  for healthcare [facility]): This is a written record that states who you want to make healthcare choices for you when you are unable to make them for yourself  This person, called a proxy, is usually a family member or a friend  You may choose more than 1 proxy  · Do not resuscitate (DNR) order:  A DNR order is used in case your heart stops beating or you stop breathing  It is a request not to have certain forms of treatment, such as CPR  A DNR order may be included in other types of advance directives  · Medical directive: This covers the care that you want if you are in a coma, near death, or unable to make decisions for yourself  You can list the treatments you want for each condition  Treatment may include pain medicine, surgery, blood transfusions, dialysis, IV or tube feedings, and a ventilator (breathing machine)  · Values history: This document has questions about your views, beliefs, and how you feel and think about life  This information can help others choose the care that you would choose  Why are advance directives important?   An advance directive helps you control your care  Although spoken wishes may be used, it is better to have your wishes written down  Spoken wishes can be misunderstood, or not followed  Treatments may be given even if you do not want them  An advance directive may make it easier for your family to make difficult choices about your care  Weight Management   Why it is important to manage your weight:  Being overweight increases your risk of health conditions such as heart disease, high blood pressure, type 2 diabetes, and certain types of cancer  It can also increase your risk for osteoarthritis, sleep apnea, and other respiratory problems  Aim for a slow, steady weight loss  Even a small amount of weight loss can lower your risk of health problems  How to lose weight safely:  A safe and healthy way to lose weight is to eat fewer calories and get regular exercise  You can lose up about 1 pound a week by decreasing the number of calories you eat by 500 calories each day  Healthy meal plan for weight management:  A healthy meal plan includes a variety of foods, contains fewer calories, and helps you stay healthy  A healthy meal plan includes the following:  · Eat whole-grain foods more often  A healthy meal plan should contain fiber  Fiber is the part of grains, fruits, and vegetables that is not broken down by your body  Whole-grain foods are healthy and provide extra fiber in your diet  Some examples of whole-grain foods are whole-wheat breads and pastas, oatmeal, brown rice, and bulgur  · Eat a variety of vegetables every day  Include dark, leafy greens such as spinach, kale, jesus greens, and mustard greens  Eat yellow and orange vegetables such as carrots, sweet potatoes, and winter squash  · Eat a variety of fruits every day  Choose fresh or canned fruit (canned in its own juice or light syrup) instead of juice  Fruit juice has very little or no fiber  · Eat low-fat dairy foods  Drink fat-free (skim) milk or 1% milk   Eat fat-free yogurt and low-fat cottage cheese  Try low-fat cheeses such as mozzarella and other reduced-fat cheeses  · Choose meat and other protein foods that are low in fat  Choose beans or other legumes such as split peas or lentils  Choose fish, skinless poultry (chicken or turkey), or lean cuts of red meat (beef or pork)  Before you cook meat or poultry, cut off any visible fat  · Use less fat and oil  Try baking foods instead of frying them  Add less fat, such as margarine, sour cream, regular salad dressing and mayonnaise to foods  Eat fewer high-fat foods  Some examples of high-fat foods include french fries, doughnuts, ice cream, and cakes  · Eat fewer sweets  Limit foods and drinks that are high in sugar  This includes candy, cookies, regular soda, and sweetened drinks  Exercise:  Exercise at least 30 minutes per day on most days of the week  Some examples of exercise include walking, biking, dancing, and swimming  You can also fit in more physical activity by taking the stairs instead of the elevator or parking farther away from stores  Ask your healthcare provider about the best exercise plan for you  © Copyright [employer] 2018 Information is for End User's use only and may not be sold, redistributed or otherwise used for commercial purposes  All illustrations and images included in CareNotes® are the copyrighted property of A [employer] M [employer] Inc  or [facility]           Chief Complaint     Chief Complaint   Patient presents with    Medicare Wellness Visit    a1c         History of Present Illness       Here for 6 month medcheck- no complaints- wants help losing weight- is willing to go to the weight loss center- discussed with him and his wife to take a week log of everything they had to eat/drink and take to the first appointment     Reviewed hga1c today 6 3- will continue same medications      Review of Systems   Review of Systems      Current Medications       Current Outpatient Medications:    amLODIPine (NORVASC) 10 mg tablet, Take 1 tablet (10 mg total) by mouth daily, Disp: 90 tablet, Rfl: 1    NELLY ASPIRIN EC LOW DOSE PO, Take 81 mg by mouth daily, Disp: , Rfl:     ezetimibe (ZETIA) 10 mg tablet, Take 1 tablet (10 mg total) by mouth daily, Disp: 90 tablet, Rfl: 1    fexofenadine (ALLEGRA ALLERGY) 180 MG tablet, Take 180 mg by mouth daily as needed, Disp: , Rfl:     glyBURIDE (DIABETA) 5 mg tablet, Take 1 tablet (5 mg total) by mouth 2 (two) times a day, Disp: 180 tablet, Rfl: 1    hydrochlorothiazide (HYDRODIURIL) 12 5 mg tablet, Take 1 tablet (12 5 mg total) by mouth daily, Disp: 90 tablet, Rfl: 1    Multiple Vitamins-Minerals (CENTRUM SILVER 50+MEN PO), Centrum Silver, Disp: , Rfl:     ONETOUCH DELICA LANCETS 83F MISC, TEST daily, Disp: , Rfl:     pioglitazone (ACTOS) 15 mg tablet, Take 1 tablet (15 mg total) by mouth daily, Disp: 90 tablet, Rfl: 1    potassium chloride (K-DUR,KLOR-CON) 20 mEq tablet, Take 1 tablet (20 mEq total) by mouth daily, Disp: 90 tablet, Rfl: 1    valsartan (DIOVAN) 160 mg tablet, Take 1 tablet (160 mg total) by mouth daily, Disp: 90 tablet, Rfl: 1    Current Allergies     Allergies as of 09/25/2020    (No Known Allergies)            The following portions of the patient's history were reviewed and updated as appropriate: allergies, current medications, past family history, past medical history, past social history, past surgical history and problem list      Past Medical History:   Diagnosis Date    Abnormal liver function tests     Abnormal weight gain     Atopic dermatitis     Benign essential hypertension     Carotid artery occlusion     Diabetes [facility]     Disease of thyroid gland     Essential hypertension     Gallstone     Hypertension     Localized primary osteoarthritis     Malaise and fatigue     Mixed hyperlipidemia     Morbid obesity with body mass index (BMI) of 40 0 or higher (HCC)     Obesity with body mass index greater than 30     Peripheral vascular disease [facility]     Sciatica     Type 2 diabetes mellitus (HCC)     Type II diabetes mellitus, uncontrolled ([facility] )     Unspecified osteoarthritis, unspecified site     Weight decreased        Past Surgical History:   Procedure Laterality Date    APPENDECTOMY      CARPAL TUNNEL RELEASE      CHOLECYSTECTOMY      COLONOSCOPY  2014 12 polyps     JOINT REPLACEMENT Left     TKR    JOINT REPLACEMENT Right     Total knee replacement     SHOULDER SURGERY Left     repair        Family History   Problem Relation Age of Onset   Cody Polio Breast cancer Mother     Breast cancer Sister     Sudden death Brother         MVA    Diabetes Brother          Medications have been verified  Objective   /74   Pulse 71   Temp 98 1 °F (36 7 °C)   Resp 20   Ht 5' 7" (1 702 m)   Wt 130 kg (286 lb)   SpO2 100%   BMI 44 79 kg/m²        Physical Exam     Physical Exam  Vitals signs and nursing note reviewed  Constitutional:       General: He is not in acute distress  Appearance: He is well-developed  He is obese  He is not diaphoretic  Neck:      Musculoskeletal: Normal range of motion and neck supple  Thyroid: No thyromegaly  Trachea: No tracheal deviation  Cardiovascular:      Rate and Rhythm: Normal rate and regular rhythm  Pulses: no weak pulses          Dorsalis pedis pulses are 2+ on the right side and 2+ on the left side  Heart sounds: Normal heart sounds  No murmur  Pulmonary:      Effort: Pulmonary effort is normal  No respiratory distress  Breath sounds: Normal breath sounds  No wheezing  Abdominal:      General: There is distension  Musculoskeletal: Normal range of motion  General: No tenderness or deformity  Right lower leg: No edema  Left lower leg: No edema  Comments: Brownish discoloration of skin of b/l LE   Feet:      Right foot:      Skin integrity: Dry skin present   No ulcer, skin breakdown, erythema, warmth or callus  Left foot:      Skin integrity: Dry skin present  No ulcer, skin breakdown, erythema, warmth or callus  Skin:     General: Skin is warm and dry  Neurological:      Mental Status: He is alert and oriented to person, place, and time  Psychiatric:         Mood and Affect: Mood normal          Speech: Speech normal          Behavior: Behavior normal          Thought Content: Thought content normal          Judgment: Judgment normal          Patient's shoes and socks removed  Right Foot/Ankle   Right Foot Inspection  Skin Exam: skin normal, skin intact and dry skin no warmth, no callus, no erythema, no maceration, no abnormal color, no pre-ulcer, no ulcer and no callus                          Toe Exam: ROM and strength within normal limits  Sensory       Monofilament testing: intact  Vascular  Capillary refills: < 3 seconds  The right DP pulse is 2+  Left Foot/Ankle  Left Foot Inspection  Skin Exam: skin normal, skin intact and dry skinno warmth, no erythema, no maceration, normal color, no pre-ulcer, no ulcer and no callus                         Toe Exam: ROM and strength within normal limits                   Sensory       Monofilament: intact  Vascular  Capillary refills: < 3 seconds  The left DP pulse is 2+  Assign Risk Category:  No deformity present; No loss of protective sensation;  No weak pulses       Risk: 0

## 2020-09-25 NOTE — PROGRESS NOTES
Assessment and Plan:     Problem List Items Addressed This Visit        Endocrine    Diabetes mellitus ([facility] )    Relevant Orders    POCT hemoglobin A1c (Completed)    Comprehensive metabolic panel    Microalbumin / creatinine urine ratio       Other    Class 3 severe obesity due to excess calories with serious comorbidity and body mass index (BMI) of 40 0 to 44 9 in adult [facility])    Relevant Orders    Ambulatory referral to Weight Management    Mixed hyperlipidemia    Relevant Orders    Lipid panel      Other Visit Diagnoses     Encounter for Medicare annual wellness exam    -  Primary    Need for vaccination        Relevant Orders    influenza vaccine, high-dose, PF 0 7 mL (FLUZONE HIGH-DOSE) (Completed)        BMI Counseling: Body mass index is 44 79 kg/m²  The BMI is above normal  Nutrition recommendations include decreasing portion sizes, encouraging healthy choices of fruits and vegetables, decreasing fast food intake, consuming healthier snacks, limiting drinks that contain sugar, moderation in carbohydrate intake and increasing intake of lean protein  Preventive health issues were discussed with patient, and age appropriate screening tests were ordered as noted in patient's After Visit Summary  Personalized health advice and appropriate referrals for health education or preventive services given if needed, as noted in patient's After Visit Summary       History of Present Illness:     Patient presents for Medicare Annual Wellness visit    Patient Care Team:  Griffin Canavan as PCP - General (Family Medicine)     Problem List:     Patient Active Problem List   Diagnosis    Diabetes mellitus ([facility] )    Encounter for diabetic foot exam ([facility] )    Class 3 severe obesity due to excess calories with serious comorbidity and body mass index (BMI) of 40 0 to 44 9 in adult ([facility] )    Hypertension    Lung nodule seen on imaging study    Mixed hyperlipidemia    Renal cyst, left    Edema      Past Medical and Surgical History:     Past Medical History:   Diagnosis Date    Abnormal liver function tests     Abnormal weight gain     Atopic dermatitis     Benign essential hypertension     Carotid artery occlusion     Diabetes mellitus ([address] )     Disease of thyroid gland     Essential hypertension     Gallstone     Hypertension     Localized primary osteoarthritis     Malaise and fatigue     Mixed hyperlipidemia     Morbid obesity with body mass index (BMI) of 40 0 or higher (HCC)     Obesity with body mass index greater than 30     Peripheral vascular disease (HCC)     Sciatica     Type 2 diabetes mellitus (HCC)     Type II diabetes mellitus, uncontrolled (HCC)     Unspecified osteoarthritis, unspecified site     Weight decreased      Past Surgical History:   Procedure Laterality Date    APPENDECTOMY      CARPAL TUNNEL RELEASE      CHOLECYSTECTOMY      COLONOSCOPY  2014 12 polyps     JOINT REPLACEMENT Left     TKR    JOINT REPLACEMENT Right     Total knee replacement     SHOULDER SURGERY Left     repair       Family History:     Family History   Problem Relation Age of Onset   [facility] Breast cancer Mother     Breast cancer Sister     Sudden death Brother         MVA    Diabetes Brother       Social History:        Social History     Socioeconomic History    Marital status: /Civil Union     Spouse name: None    Number of children: None    Years of education: None    Highest education level: None   Occupational History    Occupation: [employer]  in steady days    Social Needs    Financial resource strain: None    Food insecurity     Worry: None     Inability: None    Transportation needs     Medical: None     Non-medical: None   Tobacco Use    Smoking status: Never Smoker    Smokeless tobacco: Never Used    Tobacco comment: Former smoker - As per [employer]    Substance and Sexual Activity    Alcohol use: Yes     Comment: occasionally    Drug use: Never    Sexual activity: None Lifestyle    Physical activity     Days per week: None     Minutes per session: None    Stress: None   Relationships    Social connections     Talks on phone: None     Gets together: None     Attends Rastafarian service: None     Active member of club or organization: None     Attends meetings of clubs or organizations: None     Relationship status: None    Intimate partner violence     Fear of current or ex partner: None     Emotionally abused: None     Physically abused: None     Forced sexual activity: None   Other Topics Concern    None   Social History Narrative    Consumes on average 3 cups of regular coffee per day       Medications and Allergies:     Current Outpatient Medications   Medication Sig Dispense Refill    amLODIPine (NORVASC) 10 mg tablet Take 1 tablet (10 mg total) by mouth daily 90 tablet 1    NELLY ASPIRIN EC LOW DOSE PO Take 81 mg by mouth daily      ezetimibe (ZETIA) 10 mg tablet Take 1 tablet (10 mg total) by mouth daily 90 tablet 1    fexofenadine (ALLEGRA ALLERGY) 180 MG tablet Take 180 mg by mouth daily as needed      glyBURIDE (DIABETA) 5 mg tablet Take 1 tablet (5 mg total) by mouth 2 (two) times a day 180 tablet 1    hydrochlorothiazide (HYDRODIURIL) 12 5 mg tablet Take 1 tablet (12 5 mg total) by mouth daily 90 tablet 1    Multiple Vitamins-Minerals (CENTRUM SILVER 50+MEN PO) Centrum Silver      ONETOUCH DELICA LANCETS 44Q MISC TEST daily      pioglitazone (ACTOS) 15 mg tablet Take 1 tablet (15 mg total) by mouth daily 90 tablet 1    potassium chloride (K-DUR,KLOR-CON) 20 mEq tablet Take 1 tablet (20 mEq total) by mouth daily 90 tablet 1    valsartan (DIOVAN) 160 mg tablet Take 1 tablet (160 mg total) by mouth daily 90 tablet 1     No current facility-administered medications for this visit        No Known Allergies   Immunizations:     Immunization History   Administered Date(s) Administered    INFLUENZA 10/20/2016, 10/11/2017, 09/19/2018    Influenza, high dose seasonal 0 7 mL 09/24/2019, 09/25/2020    Pneumococcal Conjugate 13-Valent 10/20/2016    Pneumococcal Polysaccharide PPV23 03/12/2018    Tdap 10/26/2014, 09/06/2019    Zoster 11/10/2014    Zoster Vaccine Recombinant 01/01/2020      Health Maintenance:         Topic Date Due    Hepatitis C Screening  Completed         Topic Date Due    Influenza Vaccine  07/01/2020      Medicare Health Risk Assessment:     /74   Pulse 71   Temp 98 1 °F (36 7 °C)   Resp 20   Ht 5' 7" (1 702 m)   Wt 130 kg (286 lb)   SpO2 100%   BMI 44 79 kg/m²      Dali Mojica is here for his Subsequent Wellness visit  Health Risk Assessment:   Patient rates overall health as good  Patient feels that their physical health rating is same  Eyesight was rated as same  Hearing was rated as same  Patient feels that their emotional and mental health rating is same  Pain experienced in the last 7 days has been none  Patient states that he has experienced no weight loss or gain in last 6 months  Depression Screening:   PHQ-2 Score: 0      Fall Risk Screening: In the past year, patient has experienced: no history of falling in past year      Home Safety:  Patient does not have trouble with stairs inside or outside of their home  Patient has working smoke alarms and has working carbon monoxide detector  Home safety hazards include: none  Nutrition:   Current diet is Diabetic  Medications:   Patient is not currently taking any over-the-counter supplements  Patient is able to manage medications  Activities of Daily Living (ADLs)/Instrumental Activities of Daily Living (IADLs):   Walk and transfer into and out of bed and chair?: Yes  Dress and groom yourself?: Yes    Bathe or shower yourself?: Yes    Feed yourself?  Yes  Do your laundry/housekeeping?: Yes  Manage your money, pay your bills and track your expenses?: Yes  Make your own meals?: Yes    Do your own shopping?: Yes    Previous Hospitalizations:   Any hospitalizations or ED visits within the last 12 months?: No      PREVENTIVE SCREENINGS      Cardiovascular Screening:    General: Screening Not Indicated and History Lipid Disorder      Diabetes Screening:     General: Screening Not Indicated and History Diabetes      Colorectal Cancer Screening:     General: Screening Current      Prostate Cancer Screening:    General: Screening Current      Abdominal Aortic Aneurysm (AAA) Screening:    Risk factors include: age between 73-69 yo        Lung Cancer Screening:     General: Screening Not Indicated      Hepatitis C Screening:    General: Screening Current      BRANDI Khan

## 2020-09-26 ENCOUNTER — APPOINTMENT (OUTPATIENT)
Dept: LAB | Facility: CLINIC | Age: 70
End: 2020-09-26
Payer: MEDICARE

## 2020-09-26 DIAGNOSIS — E78.2 MIXED HYPERLIPIDEMIA: ICD-10-CM

## 2020-09-26 DIAGNOSIS — E11.9 TYPE 2 DIABETES MELLITUS WITHOUT COMPLICATION, WITH LONG-TERM CURRENT USE OF INSULIN (HCC): ICD-10-CM

## 2020-09-26 DIAGNOSIS — Z79.4 TYPE 2 DIABETES MELLITUS WITHOUT COMPLICATION, WITH LONG-TERM CURRENT USE OF INSULIN (HCC): ICD-10-CM

## 2020-09-26 LAB
ALBUMIN SERPL BCP-MCNC: 3.7 G/DL (ref 3.5–5)
ALP SERPL-CCNC: 81 U/L (ref 46–116)
ALT SERPL W P-5'-P-CCNC: 47 U/L (ref 12–78)
ANION GAP SERPL CALCULATED.3IONS-SCNC: 3 MMOL/L (ref 4–13)
AST SERPL W P-5'-P-CCNC: 22 U/L (ref 5–45)
BILIRUB SERPL-MCNC: 0.5 MG/DL (ref 0.2–1)
BUN SERPL-MCNC: 17 MG/DL (ref 5–25)
CALCIUM SERPL-MCNC: 8.8 MG/DL (ref 8.3–10.1)
CHLORIDE SERPL-SCNC: 107 MMOL/L (ref 100–108)
CHOLEST SERPL-MCNC: 167 MG/DL (ref 50–200)
CO2 SERPL-SCNC: 31 MMOL/L (ref 21–32)
CREAT SERPL-MCNC: 1 MG/DL (ref 0.6–1.3)
CREAT UR-MCNC: 112 MG/DL
GFR SERPL CREATININE-BSD FRML MDRD: 76 ML/MIN/1.73SQ M
GLUCOSE P FAST SERPL-MCNC: 119 MG/DL (ref 65–99)
HDLC SERPL-MCNC: 42 MG/DL
LDLC SERPL CALC-MCNC: 96 MG/DL (ref 0–100)
MICROALBUMIN UR-MCNC: 81.4 MG/L (ref 0–20)
MICROALBUMIN/CREAT 24H UR: 73 MG/G CREATININE (ref 0–30)
NONHDLC SERPL-MCNC: 125 MG/DL
POTASSIUM SERPL-SCNC: 3.9 MMOL/L (ref 3.5–5.3)
PROT SERPL-MCNC: 7.4 G/DL (ref 6.4–8.2)
SODIUM SERPL-SCNC: 141 MMOL/L (ref 136–145)
TRIGL SERPL-MCNC: 144 MG/DL

## 2020-09-26 PROCEDURE — 82570 ASSAY OF URINE CREATININE: CPT | Performed by: NURSE PRACTITIONER

## 2020-09-26 PROCEDURE — 80053 COMPREHEN METABOLIC PANEL: CPT

## 2020-09-26 PROCEDURE — 82043 UR ALBUMIN QUANTITATIVE: CPT | Performed by: NURSE PRACTITIONER

## 2020-09-26 PROCEDURE — 36415 COLL VENOUS BLD VENIPUNCTURE: CPT

## 2020-09-26 PROCEDURE — 80061 LIPID PANEL: CPT

## 2020-10-01 LAB
LEFT EYE DIABETIC RETINOPATHY: NORMAL
RIGHT EYE DIABETIC RETINOPATHY: NORMAL

## 2020-10-08 ENCOUNTER — CONSULT (OUTPATIENT)
Dept: BARIATRICS | Facility: CLINIC | Age: 70
End: 2020-10-08
Payer: MEDICARE

## 2020-10-08 ENCOUNTER — APPOINTMENT (OUTPATIENT)
Dept: LAB | Facility: CLINIC | Age: 70
End: 2020-10-08
Payer: MEDICARE

## 2020-10-08 VITALS
HEART RATE: 72 BPM | HEIGHT: 69 IN | BODY MASS INDEX: 41.92 KG/M2 | WEIGHT: 283 LBS | RESPIRATION RATE: 18 BRPM | DIASTOLIC BLOOD PRESSURE: 76 MMHG | SYSTOLIC BLOOD PRESSURE: 128 MMHG | TEMPERATURE: 99.2 F

## 2020-10-08 DIAGNOSIS — E66.01 MORBID OBESITY WITH BMI OF 40.0-44.9, ADULT (HCC): ICD-10-CM

## 2020-10-08 DIAGNOSIS — R63.5 ABNORMAL WEIGHT GAIN: ICD-10-CM

## 2020-10-08 DIAGNOSIS — E66.01 MORBID OBESITY WITH BMI OF 40.0-44.9, ADULT (HCC): Primary | ICD-10-CM

## 2020-10-08 DIAGNOSIS — Z79.4 TYPE 2 DIABETES MELLITUS WITHOUT COMPLICATION, WITH LONG-TERM CURRENT USE OF INSULIN (HCC): ICD-10-CM

## 2020-10-08 DIAGNOSIS — E78.2 MIXED HYPERLIPIDEMIA: ICD-10-CM

## 2020-10-08 DIAGNOSIS — E11.9 TYPE 2 DIABETES MELLITUS WITHOUT COMPLICATION, WITH LONG-TERM CURRENT USE OF INSULIN (HCC): ICD-10-CM

## 2020-10-08 DIAGNOSIS — I10 ESSENTIAL HYPERTENSION: ICD-10-CM

## 2020-10-08 LAB — TSH SERPL DL<=0.05 MIU/L-ACNC: 1.38 UIU/ML (ref 0.36–3.74)

## 2020-10-08 PROCEDURE — 99204 OFFICE O/P NEW MOD 45 MIN: CPT | Performed by: PHYSICIAN ASSISTANT

## 2020-10-08 PROCEDURE — 36415 COLL VENOUS BLD VENIPUNCTURE: CPT

## 2020-10-08 PROCEDURE — 84443 ASSAY THYROID STIM HORMONE: CPT

## 2020-10-09 DIAGNOSIS — Z79.4 TYPE 2 DIABETES MELLITUS WITHOUT COMPLICATION, WITH LONG-TERM CURRENT USE OF INSULIN (HCC): ICD-10-CM

## 2020-10-09 DIAGNOSIS — E11.9 TYPE 2 DIABETES MELLITUS WITHOUT COMPLICATION, WITH LONG-TERM CURRENT USE OF INSULIN (HCC): ICD-10-CM

## 2020-10-09 RX ORDER — GLYBURIDE 5 MG/1
5 TABLET ORAL 2 TIMES DAILY
Qty: 15 TABLET | Refills: 0 | Status: SHIPPED | OUTPATIENT
Start: 2020-10-09 | End: 2020-10-12 | Stop reason: SDUPTHER

## 2020-10-09 RX ORDER — GLYBURIDE 5 MG/1
5 TABLET ORAL 2 TIMES DAILY
Qty: 180 TABLET | Refills: 1 | Status: SHIPPED | OUTPATIENT
Start: 2020-10-09 | End: 2020-10-09 | Stop reason: SDUPTHER

## 2020-10-12 DIAGNOSIS — E11.9 TYPE 2 DIABETES MELLITUS WITHOUT COMPLICATION, WITH LONG-TERM CURRENT USE OF INSULIN (HCC): ICD-10-CM

## 2020-10-12 DIAGNOSIS — Z79.4 TYPE 2 DIABETES MELLITUS WITHOUT COMPLICATION, WITH LONG-TERM CURRENT USE OF INSULIN (HCC): ICD-10-CM

## 2020-10-12 RX ORDER — GLYBURIDE 5 MG/1
5 TABLET ORAL 2 TIMES DAILY
Qty: 180 TABLET | Refills: 3 | Status: SHIPPED | OUTPATIENT
Start: 2020-10-12 | End: 2021-05-07 | Stop reason: SDUPTHER

## 2020-10-15 ENCOUNTER — TELEPHONE (OUTPATIENT)
Dept: OTHER | Facility: OTHER | Age: 70
End: 2020-10-15

## 2020-10-16 ENCOUNTER — OFFICE VISIT (OUTPATIENT)
Dept: NEPHROLOGY | Facility: CLINIC | Age: 70
End: 2020-10-16
Payer: MEDICARE

## 2020-10-16 VITALS
HEART RATE: 62 BPM | OXYGEN SATURATION: 98 % | DIASTOLIC BLOOD PRESSURE: 72 MMHG | WEIGHT: 283 LBS | TEMPERATURE: 98.3 F | BODY MASS INDEX: 41.92 KG/M2 | HEIGHT: 69 IN | SYSTOLIC BLOOD PRESSURE: 130 MMHG

## 2020-10-16 DIAGNOSIS — N28.1 RENAL CYST, LEFT: Primary | ICD-10-CM

## 2020-10-16 DIAGNOSIS — I10 ESSENTIAL HYPERTENSION: ICD-10-CM

## 2020-10-16 DIAGNOSIS — R60.9 EDEMA, UNSPECIFIED TYPE: ICD-10-CM

## 2020-10-16 DIAGNOSIS — Z79.4 TYPE 2 DIABETES MELLITUS WITHOUT COMPLICATION, WITH LONG-TERM CURRENT USE OF INSULIN (HCC): ICD-10-CM

## 2020-10-16 DIAGNOSIS — E11.9 TYPE 2 DIABETES MELLITUS WITHOUT COMPLICATION, WITH LONG-TERM CURRENT USE OF INSULIN (HCC): ICD-10-CM

## 2020-10-16 PROCEDURE — 99213 OFFICE O/P EST LOW 20 MIN: CPT | Performed by: INTERNAL MEDICINE

## 2020-10-16 RX ORDER — PIOGLITAZONEHYDROCHLORIDE 15 MG/1
15 TABLET ORAL DAILY
Qty: 90 TABLET | Refills: 1 | Status: SHIPPED | OUTPATIENT
Start: 2020-10-16 | End: 2021-02-12 | Stop reason: SDUPTHER

## 2020-11-05 ENCOUNTER — OFFICE VISIT (OUTPATIENT)
Dept: BARIATRICS | Facility: CLINIC | Age: 70
End: 2020-11-05
Payer: MEDICARE

## 2020-11-05 VITALS
RESPIRATION RATE: 20 BRPM | HEIGHT: 69 IN | HEART RATE: 71 BPM | TEMPERATURE: 96.9 F | BODY MASS INDEX: 42.24 KG/M2 | WEIGHT: 285.2 LBS | SYSTOLIC BLOOD PRESSURE: 150 MMHG | DIASTOLIC BLOOD PRESSURE: 72 MMHG

## 2020-11-05 DIAGNOSIS — E66.01 MORBID OBESITY WITH BMI OF 40.0-44.9, ADULT (HCC): Primary | ICD-10-CM

## 2020-11-05 DIAGNOSIS — Z79.4 TYPE 2 DIABETES MELLITUS WITHOUT COMPLICATION, WITH LONG-TERM CURRENT USE OF INSULIN (HCC): ICD-10-CM

## 2020-11-05 DIAGNOSIS — E11.9 TYPE 2 DIABETES MELLITUS WITHOUT COMPLICATION, WITH LONG-TERM CURRENT USE OF INSULIN (HCC): ICD-10-CM

## 2020-11-05 DIAGNOSIS — I10 ESSENTIAL HYPERTENSION: ICD-10-CM

## 2020-11-05 PROCEDURE — 99214 OFFICE O/P EST MOD 30 MIN: CPT | Performed by: PHYSICIAN ASSISTANT

## 2020-11-27 DIAGNOSIS — I10 ESSENTIAL HYPERTENSION: ICD-10-CM

## 2020-11-30 RX ORDER — POTASSIUM CHLORIDE 20 MEQ/1
20 TABLET, EXTENDED RELEASE ORAL DAILY
Qty: 90 TABLET | Refills: 1 | Status: SHIPPED | OUTPATIENT
Start: 2020-11-30 | End: 2021-02-26 | Stop reason: SDUPTHER

## 2021-02-12 DIAGNOSIS — E11.9 TYPE 2 DIABETES MELLITUS WITHOUT COMPLICATION, WITH LONG-TERM CURRENT USE OF INSULIN (HCC): ICD-10-CM

## 2021-02-12 DIAGNOSIS — Z79.4 TYPE 2 DIABETES MELLITUS WITHOUT COMPLICATION, WITH LONG-TERM CURRENT USE OF INSULIN (HCC): ICD-10-CM

## 2021-02-12 DIAGNOSIS — I10 ESSENTIAL HYPERTENSION: ICD-10-CM

## 2021-02-12 RX ORDER — PIOGLITAZONEHYDROCHLORIDE 15 MG/1
15 TABLET ORAL DAILY
Qty: 90 TABLET | Refills: 1 | Status: SHIPPED | OUTPATIENT
Start: 2021-02-12 | End: 2021-09-03

## 2021-02-12 RX ORDER — HYDROCHLOROTHIAZIDE 12.5 MG/1
12.5 TABLET ORAL DAILY
Qty: 90 TABLET | Refills: 1 | Status: SHIPPED | OUTPATIENT
Start: 2021-02-12 | End: 2021-08-27 | Stop reason: SDUPTHER

## 2021-02-26 DIAGNOSIS — I10 ESSENTIAL HYPERTENSION: ICD-10-CM

## 2021-02-26 RX ORDER — VALSARTAN 160 MG/1
160 TABLET ORAL DAILY
Qty: 90 TABLET | Refills: 1 | Status: SHIPPED | OUTPATIENT
Start: 2021-02-26 | End: 2021-08-20 | Stop reason: SDUPTHER

## 2021-02-26 RX ORDER — POTASSIUM CHLORIDE 20 MEQ/1
20 TABLET, EXTENDED RELEASE ORAL DAILY
Qty: 90 TABLET | Refills: 1 | Status: SHIPPED | OUTPATIENT
Start: 2021-02-26 | End: 2021-09-03

## 2021-02-26 NOTE — TELEPHONE ENCOUNTER
Pt's wife called requesting refills on these medications for him  She asked if we needed the new prescription card, which I advised her the pharmacy needs that, not us  She verbalized an understanding

## 2021-03-10 DIAGNOSIS — Z23 ENCOUNTER FOR IMMUNIZATION: ICD-10-CM

## 2021-03-12 ENCOUNTER — TELEPHONE (OUTPATIENT)
Dept: FAMILY MEDICINE CLINIC | Facility: CLINIC | Age: 71
End: 2021-03-12

## 2021-03-12 DIAGNOSIS — Z12.5 SCREENING FOR PROSTATE CANCER: ICD-10-CM

## 2021-03-12 DIAGNOSIS — Z12.5 SCREENING PSA (PROSTATE SPECIFIC ANTIGEN): ICD-10-CM

## 2021-03-12 DIAGNOSIS — I10 ESSENTIAL HYPERTENSION: ICD-10-CM

## 2021-03-12 DIAGNOSIS — Z79.4 TYPE 2 DIABETES MELLITUS WITHOUT COMPLICATION, WITH LONG-TERM CURRENT USE OF INSULIN (HCC): ICD-10-CM

## 2021-03-12 DIAGNOSIS — E11.9 TYPE 2 DIABETES MELLITUS WITHOUT COMPLICATION, WITH LONG-TERM CURRENT USE OF INSULIN (HCC): ICD-10-CM

## 2021-03-12 DIAGNOSIS — E78.2 MIXED HYPERLIPIDEMIA: Primary | ICD-10-CM

## 2021-03-12 NOTE — TELEPHONE ENCOUNTER
Patient called asking if you would like any blood work prior to his 3/29/21 appointment  Please advise

## 2021-03-16 ENCOUNTER — APPOINTMENT (OUTPATIENT)
Dept: LAB | Facility: CLINIC | Age: 71
End: 2021-03-16
Payer: MEDICARE

## 2021-03-16 DIAGNOSIS — Z79.4 TYPE 2 DIABETES MELLITUS WITHOUT COMPLICATION, WITH LONG-TERM CURRENT USE OF INSULIN (HCC): ICD-10-CM

## 2021-03-16 DIAGNOSIS — I10 ESSENTIAL HYPERTENSION: ICD-10-CM

## 2021-03-16 DIAGNOSIS — E78.2 MIXED HYPERLIPIDEMIA: ICD-10-CM

## 2021-03-16 DIAGNOSIS — E11.9 TYPE 2 DIABETES MELLITUS WITHOUT COMPLICATION, WITH LONG-TERM CURRENT USE OF INSULIN (HCC): ICD-10-CM

## 2021-03-16 DIAGNOSIS — Z12.5 SCREENING FOR PROSTATE CANCER: ICD-10-CM

## 2021-03-16 LAB
ALBUMIN SERPL BCP-MCNC: 3.9 G/DL (ref 3.5–5)
ALP SERPL-CCNC: 91 U/L (ref 46–116)
ALT SERPL W P-5'-P-CCNC: 54 U/L (ref 12–78)
ANION GAP SERPL CALCULATED.3IONS-SCNC: 3 MMOL/L (ref 4–13)
AST SERPL W P-5'-P-CCNC: 28 U/L (ref 5–45)
BILIRUB SERPL-MCNC: 0.43 MG/DL (ref 0.2–1)
BUN SERPL-MCNC: 18 MG/DL (ref 5–25)
CALCIUM SERPL-MCNC: 9.2 MG/DL (ref 8.3–10.1)
CHLORIDE SERPL-SCNC: 106 MMOL/L (ref 100–108)
CHOLEST SERPL-MCNC: 181 MG/DL (ref 50–200)
CO2 SERPL-SCNC: 31 MMOL/L (ref 21–32)
CREAT SERPL-MCNC: 0.94 MG/DL (ref 0.6–1.3)
CREAT UR-MCNC: 101 MG/DL
EST. AVERAGE GLUCOSE BLD GHB EST-MCNC: 137 MG/DL
GFR SERPL CREATININE-BSD FRML MDRD: 82 ML/MIN/1.73SQ M
GLUCOSE P FAST SERPL-MCNC: 144 MG/DL (ref 65–99)
HBA1C MFR BLD: 6.4 %
HDLC SERPL-MCNC: 38 MG/DL
LDLC SERPL CALC-MCNC: 107 MG/DL (ref 0–100)
MICROALBUMIN UR-MCNC: 319 MG/L (ref 0–20)
MICROALBUMIN/CREAT 24H UR: 316 MG/G CREATININE (ref 0–30)
NONHDLC SERPL-MCNC: 143 MG/DL
POTASSIUM SERPL-SCNC: 3.9 MMOL/L (ref 3.5–5.3)
PROT SERPL-MCNC: 7.3 G/DL (ref 6.4–8.2)
PSA SERPL-MCNC: 0.4 NG/ML (ref 0–4)
SODIUM SERPL-SCNC: 140 MMOL/L (ref 136–145)
TRIGL SERPL-MCNC: 181 MG/DL

## 2021-03-16 PROCEDURE — 82570 ASSAY OF URINE CREATININE: CPT

## 2021-03-16 PROCEDURE — 36415 COLL VENOUS BLD VENIPUNCTURE: CPT

## 2021-03-16 PROCEDURE — 80053 COMPREHEN METABOLIC PANEL: CPT

## 2021-03-16 PROCEDURE — 83036 HEMOGLOBIN GLYCOSYLATED A1C: CPT

## 2021-03-16 PROCEDURE — 80061 LIPID PANEL: CPT

## 2021-03-16 PROCEDURE — 82043 UR ALBUMIN QUANTITATIVE: CPT

## 2021-03-16 PROCEDURE — G0103 PSA SCREENING: HCPCS

## 2021-03-29 ENCOUNTER — OFFICE VISIT (OUTPATIENT)
Dept: FAMILY MEDICINE CLINIC | Facility: CLINIC | Age: 71
End: 2021-03-29
Payer: MEDICARE

## 2021-03-29 VITALS
TEMPERATURE: 97 F | HEIGHT: 69 IN | DIASTOLIC BLOOD PRESSURE: 72 MMHG | BODY MASS INDEX: 43.42 KG/M2 | RESPIRATION RATE: 18 BRPM | HEART RATE: 88 BPM | SYSTOLIC BLOOD PRESSURE: 132 MMHG | WEIGHT: 293.13 LBS | OXYGEN SATURATION: 99 %

## 2021-03-29 DIAGNOSIS — E66.01 MORBID OBESITY WITH BMI OF 40.0-44.9, ADULT (HCC): ICD-10-CM

## 2021-03-29 DIAGNOSIS — I10 ESSENTIAL HYPERTENSION: Primary | ICD-10-CM

## 2021-03-29 DIAGNOSIS — R35.1 BENIGN PROSTATIC HYPERPLASIA WITH NOCTURIA: ICD-10-CM

## 2021-03-29 DIAGNOSIS — E26.9 HYPERALDOSTERONISM (HCC): ICD-10-CM

## 2021-03-29 DIAGNOSIS — E11.9 TYPE 2 DIABETES MELLITUS WITHOUT COMPLICATION, WITH LONG-TERM CURRENT USE OF INSULIN (HCC): ICD-10-CM

## 2021-03-29 DIAGNOSIS — Z79.4 TYPE 2 DIABETES MELLITUS WITHOUT COMPLICATION, WITH LONG-TERM CURRENT USE OF INSULIN (HCC): ICD-10-CM

## 2021-03-29 DIAGNOSIS — E78.2 MIXED HYPERLIPIDEMIA: ICD-10-CM

## 2021-03-29 DIAGNOSIS — N40.1 BENIGN PROSTATIC HYPERPLASIA WITH NOCTURIA: ICD-10-CM

## 2021-03-29 PROCEDURE — 99214 OFFICE O/P EST MOD 30 MIN: CPT | Performed by: NURSE PRACTITIONER

## 2021-03-29 RX ORDER — EZETIMIBE 10 MG/1
10 TABLET ORAL DAILY
Qty: 90 TABLET | Refills: 3 | Status: SHIPPED | OUTPATIENT
Start: 2021-03-29 | End: 2022-01-11 | Stop reason: SDUPTHER

## 2021-03-29 RX ORDER — AMLODIPINE BESYLATE 10 MG/1
10 TABLET ORAL DAILY
Qty: 90 TABLET | Refills: 3 | Status: SHIPPED | OUTPATIENT
Start: 2021-03-29 | End: 2022-01-11 | Stop reason: SDUPTHER

## 2021-03-29 NOTE — PATIENT INSTRUCTIONS
6 month medcheck  Get labs before next visit  Continue same medications  Referral to urology given  Call if any problems/concerns

## 2021-03-29 NOTE — PROGRESS NOTES
[address] [place] [address] Primary Care        NAME: Zaida Posada is a 79 y o  male  : 1950    MRN: [id number]  DATE: 2021  TIME: 4:12 PM    Assessment and Plan   Essential hypertension [I10]  1  Essential hypertension  amLODIPine (NORVASC) 10 mg tablet   2  Mixed hyperlipidemia  ezetimibe (ZETIA) 10 mg tablet    Lipid panel   3  Morbid obesity with BMI of 40 0-44 9, adult ([facility] )     4  Hyperaldosteronism ([facility] )     5  Benign prostatic hyperplasia with nocturia  Ambulatory referral to Urology   6  Type 2 diabetes mellitus without complication, with long-term current use of insulin ([facility])  Comprehensive metabolic panel    HEMOGLOBIN A1C W/ EAG ESTIMATION         Patient Instructions     Patient Instructions   6 month medcheck  Get labs before next visit  Continue same medications  Referral to urology given  Call if any problems/concerns          Chief Complaint     Chief Complaint   Patient presents with    Follow-up         History of Present Illness       Here for 6 month medcheck- lab review and medications reviewed  He will increase exercise and continue to watch his carbohydrate and sugar intake  He reports some nocturia and dribbling with urinating  He reports urgency after drinking coffee  Normal PSA  Discussed BPH- would like to see urologist for eval       Review of Systems   Review of Systems   Constitutional: Negative for activity change, diaphoresis, fatigue and fever  HENT: Negative for congestion, facial swelling, hearing loss, rhinorrhea, sinus pressure, sinus pain, sneezing, sore throat and voice change  Eyes: Negative for discharge and visual disturbance  Respiratory: Negative for cough, choking, chest tightness, shortness of breath, wheezing and stridor  Cardiovascular: Negative for chest pain, palpitations and leg swelling  Gastrointestinal: Negative for abdominal distention, abdominal pain, constipation, diarrhea, nausea and vomiting     Endocrine: Negative for polydipsia, polyphagia and polyuria  Genitourinary: Positive for difficulty urinating, frequency and urgency  Negative for dysuria  Musculoskeletal: Negative for arthralgias, back pain, gait problem, joint swelling, myalgias, neck pain and neck stiffness  Skin: Negative for color change, rash and wound  Neurological: Negative for dizziness, syncope, speech difficulty, weakness, light-headedness and headaches  Hematological: Negative for adenopathy  Does not bruise/bleed easily  Psychiatric/Behavioral: Negative for agitation, behavioral problems, confusion, hallucinations, sleep disturbance and suicidal ideas  The patient is not nervous/anxious            Current Medications       Current Outpatient Medications:     amLODIPine (NORVASC) 10 mg tablet, Take 1 tablet (10 mg total) by mouth daily, Disp: 90 tablet, Rfl: 3    NELLY ASPIRIN EC LOW DOSE PO, Take 81 mg by mouth daily, Disp: , Rfl:     ezetimibe (ZETIA) 10 mg tablet, Take 1 tablet (10 mg total) by mouth daily, Disp: 90 tablet, Rfl: 3    fexofenadine (ALLEGRA ALLERGY) 180 MG tablet, Take 180 mg by mouth daily as needed, Disp: , Rfl:     glyBURIDE (DIABETA) 5 mg tablet, Take 1 tablet (5 mg total) by mouth 2 (two) times a day, Disp: 180 tablet, Rfl: 3    hydrochlorothiazide (HYDRODIURIL) 12 5 mg tablet, Take 1 tablet (12 5 mg total) by mouth daily, Disp: 90 tablet, Rfl: 1    Multiple Vitamins-Minerals (CENTRUM SILVER 50+MEN PO), Centrum Silver, Disp: , Rfl:     ONETOUCH DELICA LANCETS 17O MISC, TEST daily, Disp: , Rfl:     pioglitazone (ACTOS) 15 mg tablet, Take 1 tablet (15 mg total) by mouth daily, Disp: 90 tablet, Rfl: 1    potassium chloride (K-DUR,KLOR-CON) 20 mEq tablet, Take 1 tablet (20 mEq total) by mouth daily, Disp: 90 tablet, Rfl: 1    valsartan (DIOVAN) 160 mg tablet, Take 1 tablet (160 mg total) by mouth daily, Disp: 90 tablet, Rfl: 1    Current Allergies     Allergies as of 03/29/2021    (No Known Allergies)            The following portions of the patient's history were reviewed and updated as appropriate: allergies, current medications, past family history, past medical history, past social history, past surgical history and problem list      Past Medical History:   Diagnosis Date    Abnormal liver function tests     Abnormal weight gain     Atopic dermatitis     Benign essential hypertension     Carotid artery occlusion     Diabetes mellitus ([facility] )     Disease of thyroid gland     Essential hypertension     Gallstone     Hypertension     Localized primary osteoarthritis     Malaise and fatigue     Mixed hyperlipidemia     Morbid obesity with body mass index (BMI) of 40 0 or higher ([place] [address] )     Obesity with body mass index greater than 30     Peripheral vascular disease (HCC)     Sciatica     Type 2 diabetes mellitus ([place] [address] )     Type II diabetes mellitus, uncontrolled (HCC)     Unspecified osteoarthritis, unspecified site     Weight decreased        Past Surgical History:   Procedure Laterality Date    APPENDECTOMY      CARPAL TUNNEL RELEASE      CHOLECYSTECTOMY      COLONOSCOPY  2014 12 polyps     JOINT REPLACEMENT Left     TKR    JOINT REPLACEMENT Right     Total knee replacement     SHOULDER SURGERY Left     repair        Family History   Problem Relation Age of Onset    Breast cancer Mother     Heart disease Father     Stroke Father     Breast cancer Sister     Sudden death Brother         MVA    Obesity Brother     Diabetes Brother     Obesity Brother          Medications have been verified  Objective   /72   Pulse 88   Temp (!) 97 °F (36 1 °C)   Resp 18   Ht 5' 9" (1 753 m)   Wt 133 kg (293 lb 2 oz)   SpO2 99%   BMI 43 29 kg/m²        Physical Exam     Physical Exam  Vitals signs and nursing note reviewed  Constitutional:       General: He is not in acute distress  Appearance: Normal appearance  He is well-developed  He is not diaphoretic     Neck:      Musculoskeletal: Normal range of motion and neck supple  Thyroid: No thyromegaly  Trachea: No tracheal deviation  Cardiovascular:      Rate and Rhythm: Normal rate and regular rhythm  Heart sounds: Normal heart sounds  No murmur  Pulmonary:      Effort: Pulmonary effort is normal  No respiratory distress  Breath sounds: Normal breath sounds  No wheezing  Abdominal:      General: There is distension  Musculoskeletal: Normal range of motion  General: No tenderness or deformity  Right lower leg: No edema  Left lower leg: No edema  Skin:     General: Skin is warm and dry  Neurological:      Mental Status: He is alert and oriented to person, place, and time  Psychiatric:         Mood and Affect: Mood normal          Speech: Speech normal          Behavior: Behavior normal          Thought Content: Thought content normal          Judgment: Judgment normal        BMI Counseling: Body mass index is 43 29 kg/m²  The BMI is above normal  Nutrition recommendations include decreasing portion sizes, encouraging healthy choices of fruits and vegetables, decreasing fast food intake, consuming healthier snacks, limiting drinks that contain sugar, moderation in carbohydrate intake and increasing intake of lean protein  Exercise recommendations include exercising 3-5 times per week

## 2021-05-07 DIAGNOSIS — E11.9 TYPE 2 DIABETES MELLITUS WITHOUT COMPLICATION, WITH LONG-TERM CURRENT USE OF INSULIN (HCC): ICD-10-CM

## 2021-05-07 DIAGNOSIS — Z79.4 TYPE 2 DIABETES MELLITUS WITHOUT COMPLICATION, WITH LONG-TERM CURRENT USE OF INSULIN (HCC): ICD-10-CM

## 2021-05-07 RX ORDER — GLYBURIDE 5 MG/1
5 TABLET ORAL 2 TIMES DAILY
Qty: 180 TABLET | Refills: 3 | Status: SHIPPED | OUTPATIENT
Start: 2021-05-07 | End: 2021-06-21

## 2021-05-07 NOTE — TELEPHONE ENCOUNTER
Patient requesting refill(s) of: glyburide 5 mg BID    Last filled:10/12/2020 #180 x 3  Last appt:3/29/21  Next appt:10/18/21  Pharmacy: [facility]

## 2021-05-24 ENCOUNTER — TELEPHONE (OUTPATIENT)
Dept: OTHER | Facility: OTHER | Age: 71
End: 2021-05-24

## 2021-05-24 ENCOUNTER — OFFICE VISIT (OUTPATIENT)
Dept: FAMILY MEDICINE CLINIC | Facility: CLINIC | Age: 71
End: 2021-05-24
Payer: MEDICARE

## 2021-05-24 VITALS
TEMPERATURE: 98.8 F | RESPIRATION RATE: 20 BRPM | OXYGEN SATURATION: 98 % | BODY MASS INDEX: 43.01 KG/M2 | HEIGHT: 69 IN | SYSTOLIC BLOOD PRESSURE: 142 MMHG | WEIGHT: 290.4 LBS | HEART RATE: 78 BPM | DIASTOLIC BLOOD PRESSURE: 72 MMHG

## 2021-05-24 DIAGNOSIS — E11.9 TYPE 2 DIABETES MELLITUS WITHOUT COMPLICATION, WITHOUT LONG-TERM CURRENT USE OF INSULIN (HCC): Primary | ICD-10-CM

## 2021-05-24 DIAGNOSIS — E66.01 MORBID OBESITY WITH BMI OF 40.0-44.9, ADULT (HCC): ICD-10-CM

## 2021-05-24 DIAGNOSIS — I10 ESSENTIAL HYPERTENSION: ICD-10-CM

## 2021-05-24 LAB — SL AMB POCT GLUCOSE BLD: 88

## 2021-05-24 PROCEDURE — 99214 OFFICE O/P EST MOD 30 MIN: CPT | Performed by: NURSE PRACTITIONER

## 2021-05-24 PROCEDURE — 82948 REAGENT STRIP/BLOOD GLUCOSE: CPT | Performed by: NURSE PRACTITIONER

## 2021-05-24 NOTE — TELEPHONE ENCOUNTER
PT's wife called in stating  take contour trips insulin, please call into [facility] mail order pharmacy

## 2021-05-24 NOTE — PATIENT INSTRUCTIONS
1st week- stop Pioglitazone, decrease glyburide to once a day in the morning, start Metformin 500mg with dinner meal   2nd week- stop glyburide, increase Metformin to 500mg twice daily  3rd week- increase Metformin to 500mg one in the morning, 2 with dinner meal (1000mg)  4th week- increase Metformin to 2 tablets in the morning (1000mg) and 2 tablets with dinner meal (1000mg) if glucose levels are over 150 consistently  Continue this plan until 6 week follow up or call sooner    Keep track of your sugars 2-4 times per day- bring this log with you to follow up appointment

## 2021-05-24 NOTE — PROGRESS NOTES
[address] [place] [address] Primary Care        NAME: Nina Zimmerman is a 79 y o  male  : 1950    MRN: [id number]  DATE: May 24, 2021  TIME: 5:54 PM    Assessment and Plan   Type 2 diabetes mellitus without complication, without long-term current use of insulin ([facility] ) [E11 9]  1  Type 2 diabetes mellitus without complication, without long-term current use of insulin ([facility])  POCT blood glucose    metFORMIN (GLUCOPHAGE) 500 mg tablet   2  Essential hypertension     3  Morbid obesity with BMI of 40 0-44 9, adult [facility])           Patient Instructions     Patient Instructions   1st week- stop Pioglitazone, decrease glyburide to once a day in the morning, start Metformin 500mg with dinner meal   2nd week- stop glyburide, increase Metformin to 500mg twice daily  3rd week- increase Metformin to 500mg one in the morning, 2 with dinner meal (1000mg)  4th week- increase Metformin to 2 tablets in the morning (1000mg) and 2 tablets with dinner meal (1000mg) if glucose levels are over 150 consistently  Continue this plan until 6 week follow up or call sooner    Keep track of your sugars 2-4 times per day- bring this log with you to follow up appointment          Chief Complaint     Chief Complaint   Patient presents with    Low Sugars     Couple of months,         History of Present Illness       Here to discuss his blood glucose levels dropping in the 60s with exertion  He gets very dizzy, pale, and fatigued when his sugar drops  He eats protein- eggs for breakfast  Has been eating healthier- chicken and tuna on salads  Is taking Glyburide 5mg twice daily and Actos 15mg daily  He reports never having tried Metformin but is willing to      Review of Systems   Review of Systems   Constitutional: Negative for activity change, diaphoresis, fatigue and fever  HENT: Negative for congestion, facial swelling, hearing loss, rhinorrhea, sinus pressure, sinus pain, sneezing, sore throat and voice change      Eyes: Negative for discharge and visual disturbance  Respiratory: Negative for cough, choking, chest tightness, shortness of breath, wheezing and stridor  Cardiovascular: Negative for chest pain, palpitations and leg swelling  Gastrointestinal: Negative for abdominal distention, abdominal pain, constipation, diarrhea, nausea and vomiting  Endocrine: Negative for polydipsia, polyphagia and polyuria  Genitourinary: Negative for difficulty urinating, dysuria, frequency and urgency  Musculoskeletal: Negative for arthralgias, back pain, gait problem, joint swelling, myalgias, neck pain and neck stiffness  Skin: Negative for color change, rash and wound  Neurological: Negative for dizziness, syncope, speech difficulty, weakness, light-headedness and headaches  Hematological: Negative for adenopathy  Does not bruise/bleed easily  Psychiatric/Behavioral: Negative for agitation, behavioral problems, confusion, hallucinations, sleep disturbance and suicidal ideas  The patient is not nervous/anxious            Current Medications       Current Outpatient Medications:     amLODIPine (NORVASC) 10 mg tablet, Take 1 tablet (10 mg total) by mouth daily, Disp: 90 tablet, Rfl: 3    NELLY ASPIRIN EC LOW DOSE PO, Take 81 mg by mouth daily, Disp: , Rfl:     ezetimibe (ZETIA) 10 mg tablet, Take 1 tablet (10 mg total) by mouth daily, Disp: 90 tablet, Rfl: 3    fexofenadine (ALLEGRA ALLERGY) 180 MG tablet, Take 180 mg by mouth daily as needed, Disp: , Rfl:     glyBURIDE (DIABETA) 5 mg tablet, Take 1 tablet (5 mg total) by mouth 2 (two) times a day, Disp: 180 tablet, Rfl: 3    hydrochlorothiazide (HYDRODIURIL) 12 5 mg tablet, Take 1 tablet (12 5 mg total) by mouth daily, Disp: 90 tablet, Rfl: 1    Multiple Vitamins-Minerals (CENTRUM SILVER 50+MEN PO), Centrum Silver, Disp: , Rfl:     ONETOUCH DELICA LANCETS 45B MISC, TEST daily, Disp: , Rfl:     pioglitazone (ACTOS) 15 mg tablet, Take 1 tablet (15 mg total) by mouth daily, Disp: 90 tablet, Rfl: 1    potassium chloride (K-DUR,KLOR-CON) 20 mEq tablet, Take 1 tablet (20 mEq total) by mouth daily, Disp: 90 tablet, Rfl: 1    valsartan (DIOVAN) 160 mg tablet, Take 1 tablet (160 mg total) by mouth daily, Disp: 90 tablet, Rfl: 1    metFORMIN (GLUCOPHAGE) 500 mg tablet, Take 2 tablets (1,000 mg total) by mouth 2 (two) times a day with meals, Disp: 360 tablet, Rfl: 1    Current Allergies     Allergies as of 05/24/2021    (No Known Allergies)            The following portions of the patient's history were reviewed and updated as appropriate: allergies, current medications, past family history, past medical history, past social history, past surgical history and problem list      Past Medical History:   Diagnosis Date    Carotid artery occlusion     Disease of thyroid gland     Hypertension     Mixed hyperlipidemia     Peripheral vascular disease ([address] )        Past Surgical History:   Procedure Laterality Date    APPENDECTOMY      CARPAL TUNNEL RELEASE      CHOLECYSTECTOMY      COLONOSCOPY  2014 12 polyps     JOINT REPLACEMENT Left     TKR    JOINT REPLACEMENT Right     Total knee replacement     SHOULDER SURGERY Left     repair        Family History   Problem Relation Age of Onset    Breast cancer Mother     Heart disease Father     Stroke Father     Breast cancer Sister     Sudden death Brother         MVA    Obesity Brother     Diabetes Brother     Obesity Brother          Medications have been verified  Objective   /72   Pulse 78   Temp 98 8 °F (37 1 °C) (Tympanic)   Resp 20   Ht 5' 9" (1 753 m)   Wt 132 kg (290 lb 6 4 oz)   SpO2 98%   BMI 42 88 kg/m²        Physical Exam     Physical Exam  Vitals signs and nursing note reviewed  Constitutional:       General: He is not in acute distress  Appearance: He is well-developed  He is obese  He is not diaphoretic  Neck:      Musculoskeletal: Normal range of motion and neck supple  Thyroid: No thyromegaly  Trachea: No tracheal deviation  Cardiovascular:      Rate and Rhythm: Normal rate and regular rhythm  Heart sounds: Normal heart sounds  No murmur  Pulmonary:      Effort: Pulmonary effort is normal  No respiratory distress  Breath sounds: Normal breath sounds  No wheezing  Abdominal:      General: There is distension (baseline)  Musculoskeletal: Normal range of motion  General: No tenderness or deformity  Skin:     General: Skin is warm and dry  Neurological:      General: No focal deficit present  Mental Status: He is alert and oriented to person, place, and time  Psychiatric:         Speech: Speech normal          Behavior: Behavior normal          Thought Content:  Thought content normal          Judgment: Judgment normal

## 2021-05-25 NOTE — TELEPHONE ENCOUNTER
Attempted to contact patient's wife to confirm what is needed for refill as it is not clear  No answer and voicemail is full  Will try again later

## 2021-05-26 NOTE — TELEPHONE ENCOUNTER
Called and spoke with patient's wife, [facility]  She states he needs the contour test strips       Script sent back for approval

## 2021-06-16 DIAGNOSIS — E11.9 TYPE 2 DIABETES MELLITUS WITHOUT COMPLICATION, WITHOUT LONG-TERM CURRENT USE OF INSULIN (HCC): Primary | ICD-10-CM

## 2021-06-16 RX ORDER — LANCETS 33 GAUGE
EACH MISCELLANEOUS
Qty: 100 EACH | Refills: 5 | Status: SHIPPED | OUTPATIENT
Start: 2021-06-16

## 2021-06-16 NOTE — TELEPHONE ENCOUNTER
Patients wife stopped He needs His Contour [employer],   Tests 3 times a day   Ernesta Hamman 100 with 5 refills Sent to [facility]

## 2021-06-16 NOTE — TELEPHONE ENCOUNTER
Contour lancets  Last filled 3/13/18 "historical provider"    Contour Test Strips   Last filled 5/27/21 #420 x 5 at [address] [place] [address] [address] order    Spoke to Dany Kenney about test strips  She states [employer] told her they could not send the test strips       Last appointment 5/24/21  Next appointment 7/6/21    [facility]

## 2021-06-17 ENCOUNTER — TELEPHONE (OUTPATIENT)
Dept: FAMILY MEDICINE CLINIC | Facility: CLINIC | Age: 71
End: 2021-06-17

## 2021-06-17 NOTE — TELEPHONE ENCOUNTER
Patients wife states that he is only taking to Metformin 500 two times daily once in the morning then at night and stopped taking the Glyburide (Diabeta) 5 Mg Tablet His numbers are getting high they wanted to know if this is normal or not,    Please advise,

## 2021-06-17 NOTE — TELEPHONE ENCOUNTER
His Dm medication was changed and his sugars are running high  Last night before bed 162  This morning at 8:45am 222  8:46am 199  9:00am before breakfast  207  After breakfast 217

## 2021-06-17 NOTE — TELEPHONE ENCOUNTER
Here is what I told him at last visit-  Patient Instructions  1st week- stop Pioglitazone, decrease glyburide to once a day in the morning, start Metformin 500mg with dinner meal   2nd week- stop glyburide, increase Metformin to 500mg twice daily  3rd week- increase Metformin to 500mg one in the morning, 2 with dinner meal (1000mg)  4th week- increase Metformin to 2 tablets in the morning (1000mg) and 2 tablets with dinner meal (1000mg) if glucose levels are over 150 consistently  Continue this plan until 6 week follow up or call sooner      Please ask him what week he is on- he just go to the 4th week if he is tolerating the Metformin ok  LMK, thanks!

## 2021-06-19 ENCOUNTER — TELEPHONE (OUTPATIENT)
Dept: OTHER | Facility: OTHER | Age: 71
End: 2021-06-19

## 2021-06-19 NOTE — TELEPHONE ENCOUNTER
Patient's wife called requesting the Doctor to fill out the Medicare Form for Test Strips and send to the [facility] in [place] so that the prescription can be filled   Thank You

## 2021-06-21 ENCOUNTER — OFFICE VISIT (OUTPATIENT)
Dept: FAMILY MEDICINE CLINIC | Facility: CLINIC | Age: 71
End: 2021-06-21
Payer: MEDICARE

## 2021-06-21 VITALS
HEART RATE: 81 BPM | RESPIRATION RATE: 20 BRPM | WEIGHT: 286 LBS | SYSTOLIC BLOOD PRESSURE: 134 MMHG | BODY MASS INDEX: 42.36 KG/M2 | HEIGHT: 69 IN | DIASTOLIC BLOOD PRESSURE: 84 MMHG | OXYGEN SATURATION: 97 % | TEMPERATURE: 99.6 F

## 2021-06-21 DIAGNOSIS — E11.9 TYPE 2 DIABETES MELLITUS WITHOUT COMPLICATION, WITHOUT LONG-TERM CURRENT USE OF INSULIN (HCC): Primary | ICD-10-CM

## 2021-06-21 LAB — SL AMB POCT GLUCOSE BLD: 154

## 2021-06-21 PROCEDURE — 82948 REAGENT STRIP/BLOOD GLUCOSE: CPT | Performed by: FAMILY MEDICINE

## 2021-06-21 PROCEDURE — 99213 OFFICE O/P EST LOW 20 MIN: CPT | Performed by: FAMILY MEDICINE

## 2021-06-21 NOTE — PROGRESS NOTES
Assessment/Plan:       Problem List Items Addressed This Visit        Endocrine    Type 2 diabetes mellitus without complication, without long-term current use of insulin ([facility] ) - Primary    Relevant Orders    POCT blood glucose (Completed)            Subjective:      Patient ID: Mario Rehman is a 79 y o  male  HPI     Diabetes- Concern about higher glucose readings following switch to diabetes medication regimen  He has been taking the metformin at the 1,000 mg BID dose since Friday, also taking Actos 15 mg daily  Fasting glucose readings have been- 197, 154, 179, 162 with postprandial readings 187, 191, 196, 237  Higher than goal  He is feeling well, no complaints or symptoms  Advised to continue with current regimen for another week, call sooner with higher readings or any symptoms, will consider adding on GLP-1 such as semaglutide  Will check in via phone in 1 week to review readings  The following portions of the patient's history were reviewed and updated as appropriate: allergies, current medications, past family history, past medical history, past social history, past surgical history and problem list     Review of Systems   Constitutional: Negative for chills and fever  Respiratory: Negative for cough, chest tightness, shortness of breath and wheezing  Cardiovascular: Negative for chest pain and palpitations  Neurological: Negative for dizziness, light-headedness and headaches  Objective:      /84 (BP Location: Left arm, Patient Position: Sitting, Cuff Size: Standard)   Pulse 81   Temp 99 6 °F (37 6 °C)   Resp 20   Ht 5' 9" (1 753 m)   Wt 130 kg (286 lb)   SpO2 97%   BMI 42 23 kg/m²          Physical Exam  Vitals reviewed  Constitutional:       General: He is not in acute distress  Appearance: Normal appearance  He is not ill-appearing, toxic-appearing or diaphoretic  Eyes:      General:         Right eye: No discharge  Left eye: No discharge  Extraocular Movements: Extraocular movements intact  Conjunctiva/sclera: Conjunctivae normal    Pulmonary:      Effort: Pulmonary effort is normal  No respiratory distress  Neurological:      Mental Status: He is alert and oriented to person, place, and time     Psychiatric:         Mood and Affect: Mood normal          Behavior: Behavior normal            Othel Lefort, DO [address] [place] [address]

## 2021-06-29 ENCOUNTER — APPOINTMENT (OUTPATIENT)
Dept: LAB | Facility: CLINIC | Age: 71
End: 2021-06-29
Payer: MEDICARE

## 2021-06-29 DIAGNOSIS — Z79.4 TYPE 2 DIABETES MELLITUS WITHOUT COMPLICATION, WITH LONG-TERM CURRENT USE OF INSULIN (HCC): ICD-10-CM

## 2021-06-29 DIAGNOSIS — E78.2 MIXED HYPERLIPIDEMIA: ICD-10-CM

## 2021-06-29 DIAGNOSIS — E78.2 MIXED HYPERLIPIDEMIA: Primary | ICD-10-CM

## 2021-06-29 DIAGNOSIS — E11.9 TYPE 2 DIABETES MELLITUS WITHOUT COMPLICATION, WITH LONG-TERM CURRENT USE OF INSULIN (HCC): ICD-10-CM

## 2021-06-29 LAB
ALBUMIN SERPL BCP-MCNC: 3.8 G/DL (ref 3.5–5)
ALP SERPL-CCNC: 78 U/L (ref 46–116)
ALT SERPL W P-5'-P-CCNC: 55 U/L (ref 12–78)
ANION GAP SERPL CALCULATED.3IONS-SCNC: 5 MMOL/L (ref 4–13)
AST SERPL W P-5'-P-CCNC: 30 U/L (ref 5–45)
BILIRUB SERPL-MCNC: 0.59 MG/DL (ref 0.2–1)
BUN SERPL-MCNC: 19 MG/DL (ref 5–25)
CALCIUM SERPL-MCNC: 9.1 MG/DL (ref 8.3–10.1)
CHLORIDE SERPL-SCNC: 106 MMOL/L (ref 100–108)
CHOLEST SERPL-MCNC: 163 MG/DL (ref 50–200)
CO2 SERPL-SCNC: 27 MMOL/L (ref 21–32)
CREAT SERPL-MCNC: 0.88 MG/DL (ref 0.6–1.3)
EST. AVERAGE GLUCOSE BLD GHB EST-MCNC: 128 MG/DL
GFR SERPL CREATININE-BSD FRML MDRD: 87 ML/MIN/1.73SQ M
GLUCOSE P FAST SERPL-MCNC: 131 MG/DL (ref 65–99)
HBA1C MFR BLD: 6.1 %
HDLC SERPL-MCNC: 36 MG/DL
LDLC SERPL CALC-MCNC: 89 MG/DL (ref 0–100)
NONHDLC SERPL-MCNC: 127 MG/DL
POTASSIUM SERPL-SCNC: 3.8 MMOL/L (ref 3.5–5.3)
PROT SERPL-MCNC: 7.2 G/DL (ref 6.4–8.2)
SODIUM SERPL-SCNC: 138 MMOL/L (ref 136–145)
TRIGL SERPL-MCNC: 190 MG/DL

## 2021-06-29 PROCEDURE — 36415 COLL VENOUS BLD VENIPUNCTURE: CPT

## 2021-06-29 PROCEDURE — 80061 LIPID PANEL: CPT

## 2021-06-29 PROCEDURE — 83036 HEMOGLOBIN GLYCOSYLATED A1C: CPT

## 2021-06-29 PROCEDURE — 80053 COMPREHEN METABOLIC PANEL: CPT

## 2021-07-01 ENCOUNTER — CONSULT (OUTPATIENT)
Dept: UROLOGY | Facility: MEDICAL CENTER | Age: 71
End: 2021-07-01
Payer: MEDICARE

## 2021-07-01 VITALS
BODY MASS INDEX: 45.96 KG/M2 | SYSTOLIC BLOOD PRESSURE: 132 MMHG | WEIGHT: 286 LBS | DIASTOLIC BLOOD PRESSURE: 84 MMHG | HEIGHT: 66 IN

## 2021-07-01 DIAGNOSIS — R35.1 BENIGN PROSTATIC HYPERPLASIA WITH NOCTURIA: ICD-10-CM

## 2021-07-01 DIAGNOSIS — N40.1 BENIGN PROSTATIC HYPERPLASIA WITH NOCTURIA: ICD-10-CM

## 2021-07-01 DIAGNOSIS — N39.41 URGE INCONTINENCE: Primary | ICD-10-CM

## 2021-07-01 LAB
SL AMB  POCT GLUCOSE, UA: ABNORMAL
SL AMB LEUKOCYTE ESTERASE,UA: ABNORMAL
SL AMB POCT BILIRUBIN,UA: ABNORMAL
SL AMB POCT BLOOD,UA: ABNORMAL
SL AMB POCT CLARITY,UA: CLEAR
SL AMB POCT COLOR,UA: YELLOW
SL AMB POCT KETONES,UA: ABNORMAL
SL AMB POCT NITRITE,UA: ABNORMAL
SL AMB POCT PH,UA: 6
SL AMB POCT SPECIFIC GRAVITY,UA: 1.02
SL AMB POCT URINE PROTEIN: ABNORMAL
SL AMB POCT UROBILINOGEN: 0.2

## 2021-07-01 PROCEDURE — 81003 URINALYSIS AUTO W/O SCOPE: CPT | Performed by: UROLOGY

## 2021-07-01 PROCEDURE — 99204 OFFICE O/P NEW MOD 45 MIN: CPT | Performed by: UROLOGY

## 2021-07-01 RX ORDER — TAMSULOSIN HYDROCHLORIDE 0.4 MG/1
0.4 CAPSULE ORAL
Qty: 30 CAPSULE | Refills: 11 | Status: SHIPPED | OUTPATIENT
Start: 2021-07-01 | End: 2022-03-03 | Stop reason: SDUPTHER

## 2021-07-01 RX ORDER — AMOXICILLIN 500 MG/1
TABLET, FILM COATED ORAL
COMMUNITY
Start: 2021-06-28 | End: 2021-07-06

## 2021-07-01 RX ORDER — ROSUVASTATIN CALCIUM 5 MG/1
5 TABLET, COATED ORAL DAILY
Qty: 30 TABLET | Refills: 5 | Status: SHIPPED | OUTPATIENT
Start: 2021-07-01 | End: 2021-11-19

## 2021-07-01 NOTE — PATIENT INSTRUCTIONS
Try cystek saw palmetto and pumpkin seed extract over-the-counter  If these should fail after about 2 weeks, try the Flomax 1 capsule at bedtime

## 2021-07-01 NOTE — PROGRESS NOTES
[address] [facility]  [facility]  [address] [facility]  [place], [address] [facility]  [phone]  www  [facility]  [email or web address]      NAME: Joie Price  AGE: 79 y o  SEX: male  : 1950   MRN: [id number]    DATE: 2021  TIME: 10:18 AM    Assessment and Plan:    Nocturia, urge incontinence but no major frequency  No voiding difficulties so sounds mostly like overactive bladder  We discussed management, and we decided upon sending him a prescription for Flomax 0 4 mg p o  Q h s  With the warnings of retrograde ejaculation and dizziness explain  However these are very minimal occurrence, and he will fill the prescription but in the meantime he will try looking for dietary factors and perhaps trying restricting coffee and soda, and perhaps trying cystek over-the-counter  We will schedule a kidney bladder ultrasound with PVR and I will send him the results  Prostate cancer screening:  PSA is good, YELENA is negative  Recommend yearly prostate cancer screening  If all is normal we will see him back here as needed  Chief Complaint     Chief Complaint   Patient presents with    Benign Prostatic Hypertrophy    Nocturia       History of Present Illness     New patient office visit:  44-year-old man complains of 6-12 months of nocturia, 3 times per night  No previous urologic visits  He also gets some urge incontinence if he drinks coffee or soda after sitting down and getting up  The urine will actually run down his leg  He has been on hydrochlorothiazide for years  Urinalysis is negative  He gets screen for prostate cancer regularly and his last PSA was 0 4 3/16/2021  Creatinine is 0 8 2021  Last hemoglobin A1c was 6 1 on 2021    Had MRI of the abdomen 2019 which showed a left renal cyst     The following portions of the patient's history were reviewed and updated as appropriate: allergies, current medications, past family history, past medical history, past social history, past surgical history and problem list   Past Medical History:   Diagnosis Date    Carotid artery occlusion     Disease of thyroid gland     Hypertension     Mixed hyperlipidemia     Peripheral vascular disease (HCC)      Past Surgical History:   Procedure Laterality Date    APPENDECTOMY      CARPAL TUNNEL RELEASE      CHOLECYSTECTOMY      COLONOSCOPY  2014    12 polyps     JOINT REPLACEMENT Left     TKR    JOINT REPLACEMENT Right     Total knee replacement     SHOULDER SURGERY Left     repair      shoulder  Review of Systems   Review of Systems   Constitutional: Negative for fever  Respiratory: Negative for shortness of breath  Cardiovascular: Negative for chest pain  Genitourinary: Positive for urgency  Negative for difficulty urinating, discharge, dysuria, enuresis, flank pain, frequency, hematuria, penile pain, penile swelling, scrotal swelling and testicular pain  Active Problem List     Patient Active Problem List   Diagnosis    Encounter for diabetic foot exam ([facility] )    Morbid obesity with BMI of 40 0-44 9, adult ([facility] )    Hypertension    Lung nodule seen on imaging study    Mixed hyperlipidemia    Renal cyst, left    Edema    Hyperaldosteronism ([facility] )    Type 2 diabetes mellitus without complication, without long-term current use of insulin ([facility])       Objective   /84   Ht 5' 6" (1 676 m)   Wt 130 kg (286 lb)   BMI 46 16 kg/m²     Physical Exam  Constitutional:       Appearance: Normal appearance  HENT:      Head: Normocephalic and atraumatic  Eyes:      Extraocular Movements: Extraocular movements intact  Pulmonary:      Effort: Pulmonary effort is normal    Abdominal:      General: There is no distension  Palpations: Abdomen is soft  There is no mass  Tenderness: There is no abdominal tenderness  There is no left CVA tenderness, guarding or rebound  Hernia: No hernia is present     Genitourinary:     Penis: Normal  Testes: Normal       Prostate: Normal       Rectum: Normal       Comments: Has hyperpigmented lesion/mole with slightly irregular borders, about 5 mm right upper buttock  Normal circumcised phallus, testicles are descended bilaterally within normal limits  Rectal exam reveals normal tone, no masses prostate is about 40 g, smooth symmetric benign  No nodules  Musculoskeletal:         General: No swelling  Normal range of motion  Cervical back: Normal range of motion  Skin:     Coloration: Skin is not jaundiced or pale  Neurological:      General: No focal deficit present  Mental Status: He is alert and oriented to person, place, and time  Psychiatric:         Mood and Affect: Mood normal          Behavior: Behavior normal          Thought Content: Thought content normal          Judgment: Judgment normal              Current Medications     Current Outpatient Medications:     amLODIPine (NORVASC) 10 mg tablet, Take 1 tablet (10 mg total) by mouth daily, Disp: 90 tablet, Rfl: 3    amoxicillin (AMOXIL) 500 MG tablet, 4 tablets 1 hour prior to procedure, Disp: , Rfl:     NELLY ASPIRIN EC LOW DOSE PO, Take 81 mg by mouth daily, Disp: , Rfl:     ezetimibe (ZETIA) 10 mg tablet, Take 1 tablet (10 mg total) by mouth daily, Disp: 90 tablet, Rfl: 3    glucose blood test strip, Use to test sugars four times daily CONTOUR TEST STRIP, Disp: 100 each, Rfl: 5    hydrochlorothiazide (HYDRODIURIL) 12 5 mg tablet, Take 1 tablet (12 5 mg total) by mouth daily, Disp: 90 tablet, Rfl: 1    metFORMIN (GLUCOPHAGE) 500 mg tablet, Take 2 tablets (1,000 mg total) by mouth 2 (two) times a day with meals, Disp: 360 tablet, Rfl: 1    Multiple Vitamins-Minerals (CENTRUM SILVER 50+MEN PO), Centrum Silver, Disp: , Rfl:     OneTouch Delica Lancets 74T MISC, Use to test sugars three times daily   E11 9, Disp: 100 each, Rfl: 5    pioglitazone (ACTOS) 15 mg tablet, Take 1 tablet (15 mg total) by mouth daily, Disp: 90 tablet, Rfl: 1    potassium chloride (K-DUR,KLOR-CON) 20 mEq tablet, Take 1 tablet (20 mEq total) by mouth daily, Disp: 90 tablet, Rfl: 1    valsartan (DIOVAN) 160 mg tablet, Take 1 tablet (160 mg total) by mouth daily, Disp: 90 tablet, Rfl: 1    fexofenadine (ALLEGRA ALLERGY) 180 MG tablet, Take 180 mg by mouth daily as needed (Patient not taking: Reported on 6/21/2021), Disp: , Rfl:     rosuvastatin (CRESTOR) 5 mg tablet, Take 1 tablet (5 mg total) by mouth daily (Patient not taking: Reported on 7/1/2021), Disp: 30 tablet, Rfl: 5        Leonardo Boss MD

## 2021-07-02 ENCOUNTER — HOSPITAL ENCOUNTER (OUTPATIENT)
Dept: ULTRASOUND IMAGING | Facility: HOSPITAL | Age: 71
Discharge: HOME | End: 2021-07-02
Attending: UROLOGY
Payer: MEDICARE

## 2021-07-02 ENCOUNTER — TELEPHONE (OUTPATIENT)
Dept: UROLOGY | Facility: AMBULATORY SURGERY CENTER | Age: 71
End: 2021-07-02

## 2021-07-02 DIAGNOSIS — N39.41 URGE INCONTINENCE: ICD-10-CM

## 2021-07-02 DIAGNOSIS — R35.1 BENIGN PROSTATIC HYPERPLASIA WITH NOCTURIA: ICD-10-CM

## 2021-07-02 DIAGNOSIS — N40.1 BENIGN PROSTATIC HYPERPLASIA WITH NOCTURIA: ICD-10-CM

## 2021-07-02 PROCEDURE — 76770 US EXAM ABDO BACK WALL COMP: CPT

## 2021-07-02 NOTE — TELEPHONE ENCOUNTER
Patient managed by Dr Elliot Hatfield [facility]) Deonte from radiology called in to report immediate US findings in [facility]

## 2021-07-06 ENCOUNTER — OFFICE VISIT (OUTPATIENT)
Dept: FAMILY MEDICINE CLINIC | Facility: CLINIC | Age: 71
End: 2021-07-06
Payer: MEDICARE

## 2021-07-06 VITALS
TEMPERATURE: 98 F | OXYGEN SATURATION: 98 % | DIASTOLIC BLOOD PRESSURE: 68 MMHG | WEIGHT: 286 LBS | HEART RATE: 76 BPM | RESPIRATION RATE: 20 BRPM | SYSTOLIC BLOOD PRESSURE: 120 MMHG | BODY MASS INDEX: 45.96 KG/M2 | HEIGHT: 66 IN

## 2021-07-06 DIAGNOSIS — N40.1 BENIGN PROSTATIC HYPERPLASIA WITH NOCTURIA: ICD-10-CM

## 2021-07-06 DIAGNOSIS — R35.1 BENIGN PROSTATIC HYPERPLASIA WITH NOCTURIA: ICD-10-CM

## 2021-07-06 DIAGNOSIS — I10 ESSENTIAL HYPERTENSION: ICD-10-CM

## 2021-07-06 DIAGNOSIS — E11.9 TYPE 2 DIABETES MELLITUS WITHOUT COMPLICATION, WITHOUT LONG-TERM CURRENT USE OF INSULIN (HCC): Primary | ICD-10-CM

## 2021-07-06 DIAGNOSIS — E78.2 MIXED HYPERLIPIDEMIA: ICD-10-CM

## 2021-07-06 DIAGNOSIS — E11.9 ENCOUNTER FOR DIABETIC FOOT EXAM (HCC): ICD-10-CM

## 2021-07-06 PROCEDURE — 99214 OFFICE O/P EST MOD 30 MIN: CPT | Performed by: NURSE PRACTITIONER

## 2021-07-06 NOTE — PROGRESS NOTES
[facility]        NAME: Cesar Cabrera is a 79 y o  male  : 1950    MRN: [id number]  DATE: 2021  TIME: 10:37 AM    Assessment and Plan   Type 2 diabetes mellitus without complication, without long-term current use of insulin ([address] ) [E11 9]  1  Type 2 diabetes mellitus without complication, without long-term current use of insulin ([address] )     2  Benign prostatic hyperplasia with nocturia  Ambulatory referral to Urology   3  Essential hypertension     4  Mixed hyperlipidemia     5  Encounter for diabetic foot exam [facility])           Patient Instructions     Patient Instructions   Decrease Metformin to three times a day- add a 4th dose if sugar higher than 180-200 or if you eat something high in carbohydrates  Return 10/18/21 or call sooner for problems/concerns- will do HgA1c in office at this visit  Increase hydration (water- 5 - 16oz bottles/day)- make sure to eat enough salt and electrolytes  Start Rosuvastatin as directed and continue Zetia as directed          Chief Complaint     Chief Complaint   Patient presents with    Follow-up         History of Present Illness       Here today for medication follow up- is doing very well on just Metformin 4x/day- his blood sugar levels are sometimes in the 80s- discussed decreasing to three times a day to prevent dizziness/lows  He reports getting dizzy yesterday helping his son seal his driveway- it was in the 90s and was working hard- he reports not drinking nearly enough water- will try to increase this  Reviewed recent labs from - HgA1c 6 1  Reviewed recent ultrasound- recommended follow up with Dr Norm Suazo to discuss cysts on right and left kidney as well as enlarged prostate- he was recently started on Flomax for this- is still getting up every 2 hours to urinate at night but he reports this is improved from prior to Flomax         Review of Systems   Review of Systems   Constitutional: Negative for activity change, diaphoresis, fatigue and fever  HENT: Negative for congestion, facial swelling, hearing loss, rhinorrhea, sinus pressure, sinus pain, sneezing, sore throat and voice change  Eyes: Negative for discharge and visual disturbance  Respiratory: Negative for cough, choking, chest tightness, shortness of breath, wheezing and stridor  Cardiovascular: Negative for chest pain, palpitations and leg swelling  Gastrointestinal: Negative for abdominal distention, abdominal pain, constipation, diarrhea, nausea and vomiting  Endocrine: Negative for polydipsia, polyphagia and polyuria  Genitourinary: Positive for frequency  Negative for difficulty urinating, dysuria and urgency  Musculoskeletal: Negative for arthralgias, back pain, gait problem, joint swelling, myalgias, neck pain and neck stiffness  Skin: Negative for color change, rash and wound  Neurological: Positive for dizziness (with bending over)  Negative for syncope, speech difficulty, weakness, light-headedness and headaches  Hematological: Negative for adenopathy  Does not bruise/bleed easily  Psychiatric/Behavioral: Negative for agitation, behavioral problems, confusion, hallucinations, sleep disturbance and suicidal ideas  The patient is not nervous/anxious            Current Medications       Current Outpatient Medications:     amLODIPine (NORVASC) 10 mg tablet, Take 1 tablet (10 mg total) by mouth daily, Disp: 90 tablet, Rfl: 3    NELLY ASPIRIN EC LOW DOSE PO, Take 81 mg by mouth daily, Disp: , Rfl:     ezetimibe (ZETIA) 10 mg tablet, Take 1 tablet (10 mg total) by mouth daily, Disp: 90 tablet, Rfl: 3    glucose blood test strip, Use to test sugars four times daily CONTOUR TEST STRIP, Disp: 100 each, Rfl: 5    hydrochlorothiazide (HYDRODIURIL) 12 5 mg tablet, Take 1 tablet (12 5 mg total) by mouth daily, Disp: 90 tablet, Rfl: 1    metFORMIN (GLUCOPHAGE) 500 mg tablet, Take 2 tablets (1,000 mg total) by mouth 2 (two) times a day with meals, Disp: 360 tablet, Rfl: 1    Multiple Vitamins-Minerals (CENTRUM SILVER 50+MEN PO), Centrum Silver, Disp: , Rfl:     OneTouch Delica Lancets 14J MISC, Use to test sugars three times daily   E11 9, Disp: 100 each, Rfl: 5    pioglitazone (ACTOS) 15 mg tablet, Take 1 tablet (15 mg total) by mouth daily, Disp: 90 tablet, Rfl: 1    potassium chloride (K-DUR,KLOR-CON) 20 mEq tablet, Take 1 tablet (20 mEq total) by mouth daily, Disp: 90 tablet, Rfl: 1    Probiotic Product (PROBIOTIC ADVANCED PO), Take by mouth, Disp: , Rfl:     tamsulosin (FLOMAX) 0 4 mg, Take 1 capsule (0 4 mg total) by mouth daily with dinner, Disp: 30 capsule, Rfl: 11    valsartan (DIOVAN) 160 mg tablet, Take 1 tablet (160 mg total) by mouth daily, Disp: 90 tablet, Rfl: 1    rosuvastatin (CRESTOR) 5 mg tablet, Take 1 tablet (5 mg total) by mouth daily (Patient not taking: Reported on 7/1/2021), Disp: 30 tablet, Rfl: 5    Current Allergies     Allergies as of 07/06/2021    (No Known Allergies)            The following portions of the patient's history were reviewed and updated as appropriate: allergies, current medications, past family history, past medical history, past social history, past surgical history and problem list      Past Medical History:   Diagnosis Date    Carotid artery occlusion     Disease of thyroid gland     Hypertension     Mixed hyperlipidemia     Peripheral vascular disease ([address] )        Past Surgical History:   Procedure Laterality Date    APPENDECTOMY      CARPAL TUNNEL RELEASE      CHOLECYSTECTOMY      COLONOSCOPY  2014    12 polyps     JOINT REPLACEMENT Left     TKR    JOINT REPLACEMENT Right     Total knee replacement     SHOULDER SURGERY Left     repair        Family History   Problem Relation Age of Onset    Breast cancer Mother     Heart disease Father     Stroke Father     Breast cancer Sister     Sudden death Brother         MVA    Obesity Brother     Diabetes Brother     Obesity Brother Medications have been verified  Objective   /68   Pulse 76   Temp 98 °F (36 7 °C) (Tympanic)   Resp 20   Ht 5' 6" (1 676 m)   Wt 130 kg (286 lb)   SpO2 98%   BMI 46 16 kg/m²        Physical Exam     Physical Exam  Vitals and nursing note reviewed  Constitutional:       General: He is not in acute distress  Appearance: He is well-developed  He is obese  He is not diaphoretic  Neck:      Thyroid: No thyromegaly  Trachea: No tracheal deviation  Cardiovascular:      Rate and Rhythm: Normal rate and regular rhythm  Pulses: no weak pulses          Dorsalis pedis pulses are 2+ on the right side and 2+ on the left side  Heart sounds: Normal heart sounds  No murmur heard  Pulmonary:      Effort: Pulmonary effort is normal  No respiratory distress  Breath sounds: Normal breath sounds  No wheezing  Abdominal:      General: There is distension  Musculoskeletal:         General: No tenderness or deformity  Normal range of motion  Cervical back: Normal range of motion and neck supple  Feet:      Right foot:      Skin integrity: No ulcer, skin breakdown, erythema, warmth, callus or dry skin  Left foot:      Skin integrity: No ulcer, skin breakdown, erythema, warmth, callus or dry skin  Skin:     General: Skin is warm and dry  Neurological:      Mental Status: He is alert and oriented to person, place, and time  Psychiatric:         Speech: Speech normal          Behavior: Behavior normal          Thought Content: Thought content normal          Judgment: Judgment normal            Patient's shoes and socks removed  Right Foot/Ankle   Right Foot Inspection  Skin Exam: skin normal and skin intact no dry skin, no warmth, no callus, no erythema, no maceration, no abnormal color, no pre-ulcer, no ulcer and no callus                          Toe Exam: ROM and strength within normal limits  Sensory       Monofilament testing: intact  Vascular  Capillary refills: < 3 seconds  The right DP pulse is 2+  Left Foot/Ankle  Left Foot Inspection  Skin Exam: skin normal and skin intactno dry skin, no warmth, no erythema, no maceration, normal color, no pre-ulcer, no ulcer and no callus                         Toe Exam: ROM and strength within normal limits                   Sensory       Monofilament: intact  Vascular  Capillary refills: < 3 seconds  The left DP pulse is 2+  Assign Risk Category:  No deformity present; No loss of protective sensation;  No weak pulses       Risk: 0

## 2021-07-06 NOTE — PATIENT INSTRUCTIONS
Decrease Metformin to three times a day- add a 4th dose if sugar higher than 180-200 or if you eat something high in carbohydrates  Return 10/18/21 or call sooner for problems/concerns- will do HgA1c in office at this visit  Increase hydration (water- 5 - 16oz bottles/day)- make sure to eat enough salt and electrolytes  Start Rosuvastatin as directed and continue Zetia as directed

## 2021-07-15 ENCOUNTER — HOSPITAL ENCOUNTER (OUTPATIENT)
Dept: CT IMAGING | Facility: HOSPITAL | Age: 71
Discharge: HOME | End: 2021-07-15
Attending: UROLOGY
Payer: MEDICARE

## 2021-07-15 DIAGNOSIS — N28.89 RENAL MASS, RIGHT: ICD-10-CM

## 2021-07-15 PROCEDURE — 74178 CT ABD&PLV WO CNTR FLWD CNTR: CPT

## 2021-07-15 PROCEDURE — G1004 CDSM NDSC: HCPCS

## 2021-07-15 RX ADMIN — IOHEXOL 100 ML: 350 INJECTION, SOLUTION INTRAVENOUS at 07:00

## 2021-07-22 ENCOUNTER — TELEPHONE (OUTPATIENT)
Dept: UROLOGY | Facility: AMBULATORY SURGERY CENTER | Age: 71
End: 2021-07-22

## 2021-07-22 NOTE — TELEPHONE ENCOUNTER
Patient managed by Dr Yasmine Neal [facility]) Zuleika [place] from radiology called in to report significant CT findings (7/15/21)

## 2021-08-02 ENCOUNTER — TELEPHONE (OUTPATIENT)
Dept: UROLOGY | Facility: MEDICAL CENTER | Age: 71
End: 2021-08-02

## 2021-08-02 NOTE — TELEPHONE ENCOUNTER
----- Message from Trae Mak MD sent at 7/30/2021  4:20 PM EDT -----    Please schedule patient for CT scan of the chest and have him see me in appointment in the next couple of weeks, can double book  Pt has appt on Aug 6 for CT of the chest  appt to see Dr Stephen Easley in Oct

## 2021-08-05 ENCOUNTER — TELEPHONE (OUTPATIENT)
Dept: UROLOGY | Facility: CLINIC | Age: 71
End: 2021-08-05

## 2021-08-05 NOTE — TELEPHONE ENCOUNTER
----- Message from Nicolette Hammond RN sent at 8/3/2021 12:43 PM EDT -----  Please see below  Patient needs surgical consult with Dr Ramona Guillen   ----- Message -----  From: Amado Sellers MD  Sent: 8/2/2021   3:56 PM EDT  To: Nicolette Hammond RN, BRANDI Cantu, #        I spoke with the patient and his wife  I gave him the results of the CT scan  CT scan of the chest is ordered for August 6, for pulmonary nodule and right renal mass that looks like renal cell carcinoma- this is an enhancing lesion  He has an appointment for me in October but  this may be changed depending on the results of the chest CT- will also make referral  to Dr Ramona Guillen  for consideration of  surgical management- the patient told me that he   Gives permission for us to  share all medical information with his wife

## 2021-08-05 NOTE — TELEPHONE ENCOUNTER
Contacted and spoke with patient's wife, Hammad Ybarra, about scheduling an appointment for her  in regards to the renal mass  Patient scheduled tomorrow at 730 am with Dr Elba Goel at the [facility] office  Hammad Ybarra aware of office location

## 2021-08-06 ENCOUNTER — OFFICE VISIT (OUTPATIENT)
Dept: UROLOGY | Facility: CLINIC | Age: 71
End: 2021-08-06
Payer: MEDICARE

## 2021-08-06 ENCOUNTER — HOSPITAL ENCOUNTER (OUTPATIENT)
Dept: CT IMAGING | Facility: HOSPITAL | Age: 71
Discharge: HOME | End: 2021-08-06
Attending: UROLOGY
Payer: MEDICARE

## 2021-08-06 VITALS
HEIGHT: 66 IN | HEART RATE: 72 BPM | DIASTOLIC BLOOD PRESSURE: 80 MMHG | WEIGHT: 281 LBS | BODY MASS INDEX: 45.16 KG/M2 | RESPIRATION RATE: 20 BRPM | SYSTOLIC BLOOD PRESSURE: 140 MMHG

## 2021-08-06 DIAGNOSIS — N28.89 RENAL MASS, RIGHT: ICD-10-CM

## 2021-08-06 DIAGNOSIS — N28.89 RENAL MASS, RIGHT: Primary | ICD-10-CM

## 2021-08-06 DIAGNOSIS — E66.01 MORBID OBESITY WITH BMI OF 40.0-44.9, ADULT (HCC): ICD-10-CM

## 2021-08-06 DIAGNOSIS — R91.1 PULMONARY NODULE: ICD-10-CM

## 2021-08-06 DIAGNOSIS — R91.1 LUNG NODULE SEEN ON IMAGING STUDY: ICD-10-CM

## 2021-08-06 PROCEDURE — 71260 CT THORAX DX C+: CPT

## 2021-08-06 PROCEDURE — G1004 CDSM NDSC: HCPCS

## 2021-08-06 PROCEDURE — 99215 OFFICE O/P EST HI 40 MIN: CPT | Performed by: UROLOGY

## 2021-08-06 RX ADMIN — IOHEXOL 85 ML: 350 INJECTION, SOLUTION INTRAVENOUS at 13:28

## 2021-08-06 NOTE — PROGRESS NOTES
UROLOGY FOLLOWUP NOTE     CHIEF COMPLAINT   Juanita Garcia is a 79 y o  male with a complaint of   Chief Complaint   Patient presents with    Renal Mass       History of Present Illness:     79 y o  male with a history of morbid obesity and diabetes, patient a history renal cystic disease  Patient had an MRI of the abdomen with and without contrast in 2019 at an outside institution which demonstrated right upper pole hemorrhagic cyst 1 2 cm  Patient recently had an ultrasound that demonstrated progression of this concern with a renal protocol CT with questionable hypoenhancement  Presents today for discussion  In addition, the patient had a small pulmonary nodule as scheduled for chest CT      Lab Results   Component Value Date    PSA 0 4 03/16/2021    PSA 0 5 02/24/2020    PSA 0 57 05/02/2018       Past Medical History:     Past Medical History:   Diagnosis Date    Carotid artery occlusion     Disease of thyroid gland     Hypertension     Mixed hyperlipidemia     Peripheral vascular disease ([address] )     Renal mass        PAST SURGICAL HISTORY:     Past Surgical History:   Procedure Laterality Date    APPENDECTOMY      CARPAL TUNNEL RELEASE      CHOLECYSTECTOMY      COLONOSCOPY  2014    12 polyps     JOINT REPLACEMENT Left     TKR    JOINT REPLACEMENT Right     Total knee replacement     SHOULDER SURGERY Left     repair        CURRENT MEDICATIONS:     Current Outpatient Medications   Medication Sig Dispense Refill    amLODIPine (NORVASC) 10 mg tablet Take 1 tablet (10 mg total) by mouth daily 90 tablet 3    NELLY ASPIRIN EC LOW DOSE PO Take 81 mg by mouth daily      ezetimibe (ZETIA) 10 mg tablet Take 1 tablet (10 mg total) by mouth daily 90 tablet 3    glucose blood test strip Use to test sugars four times daily CONTOUR TEST STRIP 100 each 5    hydrochlorothiazide (HYDRODIURIL) 12 5 mg tablet Take 1 tablet (12 5 mg total) by mouth daily 90 tablet 1    Multiple Vitamins-Minerals (CENTRUM SILVER 50+MEN PO) Centrum Silver      OneTouch Delica Lancets 25E MISC Use to test sugars three times daily  E11 9 100 each 5    pioglitazone (ACTOS) 15 mg tablet Take 1 tablet (15 mg total) by mouth daily 90 tablet 1    potassium chloride (K-DUR,KLOR-CON) 20 mEq tablet Take 1 tablet (20 mEq total) by mouth daily 90 tablet 1    Probiotic Product (PROBIOTIC ADVANCED PO) Take by mouth      rosuvastatin (CRESTOR) 5 mg tablet Take 1 tablet (5 mg total) by mouth daily 30 tablet 5    valsartan (DIOVAN) 160 mg tablet Take 1 tablet (160 mg total) by mouth daily 90 tablet 1    metFORMIN (GLUCOPHAGE) 500 mg tablet Take 2 tablets (1,000 mg total) by mouth 2 (two) times a day with meals 360 tablet 1    tamsulosin (FLOMAX) 0 4 mg Take 1 capsule (0 4 mg total) by mouth daily with dinner (Patient not taking: Reported on 8/6/2021) 30 capsule 11     No current facility-administered medications for this visit  ALLERGIES:   No Known Allergies    SOCIAL HISTORY:     Social History     Socioeconomic History    Marital status: /Civil Union     Spouse name: None    Number of children: None    Years of education: None    Highest education level: None   Occupational History    Occupation: [employer]  in steady days    Tobacco Use    Smoking status: Never Smoker    Smokeless tobacco: Never Used    Tobacco comment: Former smoker - [facility] per [facility]    Vaping Use: Never used   Substance and Sexual Activity    Alcohol use: Yes     Comment: occasionally    Drug use: Never    Sexual activity: None   Other Topics Concern    None   Social History Narrative    Consumes on average 3 cups of regular coffee per day      Social Determinants of Health     Financial Resource Strain:     Difficulty of Paying Living Expenses:    Food Insecurity:     Worried About Running Out of Food in the Last Year:     [address] in the Last Year:    Transportation Needs:     Lack of Transportation (Medical):      Lack of Transportation (Non-Medical):    Physical Activity:     Days of Exercise per Week:     Minutes of Exercise per Session:    Stress:     Feeling of Stress :    Social Connections:     Frequency of Communication with Friends and Family:     Frequency of Social Gatherings with Friends and Family:     Attends Hindu Services:     Active Member of Clubs or Organizations:     Attends Club or Organization Meetings:     Marital Status:    Intimate Partner Violence:     Fear of Current or Ex-Partner:     Emotionally Abused:     Physically Abused:     Sexually Abused:        SOCIAL HISTORY:     Family History   Problem Relation Age of Onset    Breast cancer Mother     Heart disease Father     Stroke Father     Breast cancer Sister     Sudden death Brother         MVA    Obesity Brother     Diabetes Brother     Obesity Brother        REVIEW OF SYSTEMS:     Review of Systems   Constitutional: Negative  Respiratory: Negative  Cardiovascular: Negative  Gastrointestinal: Negative  Genitourinary: Negative  Musculoskeletal: Negative  Skin: Negative  Psychiatric/Behavioral: Negative  PHYSICAL EXAM:     /80   Pulse 72   Resp 20   Ht 5' 6" (1 676 m)   Wt 127 kg (281 lb)   BMI 45 35 kg/m²     General:  Healthy appearing morbidly obese in no acute distress  They have a normal affect  There is not appear to be any gross neurologic defects or abnormalities  HEENT:  Normocephalic, atraumatic  Neck is supple without any palpable lymphadenopathy  Cardiovascular:  Patient has normal palpable distal radial pulses  There is no significant peripheral edema  No JVD is noted  Respiratory:  Patient has unlabored respirations  There is no audible wheeze or rhonchi  Abdomen:  Abdomen is soft and nontender  There is no tympany  Inguinal and umbilical hernia are not appreciated    Musculoskeletal:  Patient does not have significant CVA tenderness in the  flank with palpation or percussion  They full range of motion in all 4 extremities  Strength in all 4 extremities appears congruent  Patient is able to ambulate without assistance or difficulty  Dermatologic:  Patient has no skin abnormalities or rashes  LABS:     CBC:   Lab Results   Component Value Date    WBC 9 71 2020    HGB 14 8 2020    HCT 44 1 2020    MCV 90 2020     2020       BMP:   Lab Results   Component Value Date    CALCIUM 9 1 2021     2018    K 3 8 2021    CO2 27 2021     2021    BUN 19 2021    CREATININE 0 88 2021       IMAGIN/15/21  CT ABDOMEN AND PELVIS WITH AND WITHOUT IV CONTRAST     INDICATION:   Renal mass      COMPARISON: Noncontrast CT January 15, 2018  Correlation Ultrasound 2021      Of note, an MRI was performed at [facility] on 2019  The images from that study are not available for comparison, though report states that there was a 1 2 cm lesion with low T2 signal in the right upper pole  Additional   signal characteristics were not described in that report      TECHNIQUE: Initial CT of the abdomen and pelvis was performed without intravenous contrast   Subsequent dynamic CT evaluation of the abdomen and pelvis was performed after the administration of intravenous contrast in both nephrographic and delayed   phases after the administration of intravenous contrast    Axial, sagittal, and coronal 2D reformatted images were created from the source data and submitted for interpretation       Radiation dose length product (DLP) for this visit:  4377 3 mGy-cm     This examination, like all CT scans performed in the [facility], was performed utilizing techniques to minimize radiation dose exposure, including the use of iterative   reconstruction and automated exposure control      IV Contrast:  100 mL of iohexol (OMNIPAQUE)  Enteric Contrast:  Enteric contrast was not administered      FINDINGS:     ABDOMEN     RIGHT KIDNEY AND URETER:     Corresponding to recent sonographic finding, there is a 2 0 x 2 4 x 1 9 cm mass in the upper pole of the right kidney  The mass is isoattenuating to the renal parenchyma on the precontrast images, and demonstrates mild enhancement (hypoenhancement) less   than that of the adjacent renal parenchyma (series 3, image 73)  The inferior margin of the mass minimally contacts the renal sinus fat (series 9, image 136)      Several renal sinus cysts      No detectable urothelial mass  No hydronephrosis or hydroureter  No urinary tract calculi  No perinephric collection      LEFT KIDNEY AND URETER:  No solid renal mass  No detectable urothelial mass  Several renal sinus cysts  No hydronephrosis or hydroureter  No urinary tract calculi  No perinephric collection      URINARY BLADDER:  No bladder wall mass  No calculi      LOWER CHEST:  0 4 cm solid subpleural nodule in the posterior right lower lobe (series 2, image 28), new since January 15, 2018     LIVER/BILIARY TREE:  Unremarkable      GALLBLADDER:  Gallbladder is surgically absent      SPLEEN:  Unremarkable      PANCREAS:  Unremarkable      ADRENAL GLANDS:  Unremarkable      STOMACH AND BOWEL:  Unremarkable      ABDOMINOPELVIC CAVITY:  No ascites  No free intraperitoneal air  No lymphadenopathy      VESSELS:  Atherosclerosis of aorta without aortic aneurysm     PELVIS     REPRODUCTIVE ORGANS:  The prostate is enlarged      APPENDIX: No findings to suggest appendicitis      ABDOMINAL WALL/INGUINAL REGIONS:  Small fat-containing left inguinal hernia     OSSEOUS STRUCTURES:  No acute fracture or destructive osseous lesion      IMPRESSION:     Right renal lesion suspicious for renal cell carcinoma, likely papillary variant  This corresponds to the finding identified on the July 2, 2021 ultrasound    A contrast-enhanced MRI is recommended for further evaluation        0 4 cm subpleural pulmonary nodule  This is unlikely to represent metastatic disease, though given renal lesion, a dedicated CT chest is advised this time for complete evaluation  OUTSIDE MRI  9/26/19  KIDNEYS: The kidneys are normal in size and signal characteristics  The   corticomedullary differentiation is well-maintained with normal excretion of   contrast with delayed phase images  No hydronephrosis or hydroureter  There are numerous, nonenhancing cystic renal lesions in the LEFT and RIGHT   kidney, most consistent with simple renal cysts, the largest is a parapelvic   cyst within the LEFT lower pole containing thin, nonenhancing septations   (Bosniak II), and measuring 1 7 x 2 4 cm  Small 1 2 cm renal lesion within the RIGHT upper pole, demonstrating low T2   signal, without enhancement most consistent with a proteinaceous or hemorrhagic   cyst      IMPRESSION:     1   Multiple bilateral simple renal cysts     2   Small, RIGHT upper pole renal lesion, favored to represent a proteinaceous   or hemorrhagic cyst     ASSESSMENT:     79 y o  male with RIGHT renal lesion, 2 4cm    PLAN:       I am hopeful that this lesion represents growth of a hemorrhagic cyst and there is no underlying concern him  However given the 1 cm growth over 2 year period of time, I think it is prudent to repeat the MR images with and without contrast   This will allow us to have better tissue definition and determine whether not this lesion is enhancing  I briefly discussed follow-up of the CT chest that has been ordered given the small pulmonary nodule  I also reviewed with the patient and his wife treatment options including surgical intervention with radical or partial nephrectomy, biopsy and cryoablation or active surveillance    Given the growth lesion, active surveillance would not be recommended if there are underlying concerns for the appearance of cyst   Once the MRI is completed, I will reach out to the patient and his wife for discussion  They were provided a handout of information about renal lesions from the [employer]

## 2021-08-12 ENCOUNTER — TELEPHONE (OUTPATIENT)
Dept: UROLOGY | Facility: CLINIC | Age: 71
End: 2021-08-12

## 2021-08-12 DIAGNOSIS — N28.89 RENAL MASS, RIGHT: Primary | ICD-10-CM

## 2021-08-12 NOTE — TELEPHONE ENCOUNTER
Call placed to patient and wife answered the phone  She was made aware of imaging results  She verbalized understanding and states she will make patient aware

## 2021-08-12 NOTE — RESULT ENCOUNTER NOTE
Please inform patient that the results of his CT scan of the chest show stable pulmonary nodules, nothing new or suspicious  Basically looks okay

## 2021-08-20 DIAGNOSIS — I10 ESSENTIAL HYPERTENSION: ICD-10-CM

## 2021-08-20 RX ORDER — VALSARTAN 160 MG/1
160 TABLET ORAL DAILY
Qty: 90 TABLET | Refills: 3 | Status: SHIPPED | OUTPATIENT
Start: 2021-08-20 | End: 2021-09-09

## 2021-08-20 NOTE — TELEPHONE ENCOUNTER
Patient requesting refill(s) of: Valsartan 160mg    Last filled: 2/26/21 #90x1  Last appt: 7/6/21  Next appt: 10/18/21  Pharm[facility]: [facility]

## 2021-08-27 DIAGNOSIS — I10 ESSENTIAL HYPERTENSION: ICD-10-CM

## 2021-08-27 RX ORDER — HYDROCHLOROTHIAZIDE 12.5 MG/1
12.5 TABLET ORAL DAILY
Qty: 90 TABLET | Refills: 1 | Status: SHIPPED | OUTPATIENT
Start: 2021-08-27 | End: 2022-01-11 | Stop reason: SDUPTHER

## 2021-09-01 ENCOUNTER — PATIENT OUTREACH (OUTPATIENT)
Dept: UROLOGY | Facility: CLINIC | Age: 71
End: 2021-09-01

## 2021-09-01 NOTE — PROGRESS NOTES
Mitesh Beckman had his MRI at [facility] on 8/14/21 ordered by Dr Owen Goodness  The results are in [place]  Spoke to his wife  She wants a call to review the results and plan

## 2021-09-02 NOTE — PROGRESS NOTES
It took a little while to get the MRI images from [facility] so I could review them  This does appear to be a hypoenhancing however enhancing lesion of the right upper pole  His CT of the chest which was done at [facility] is negative for concerning growth of his small pulmonary nodules  I did discuss with the patient and his wife by phone the findings  I again reviewed with them the options for treatment  Although partial nephrectomy is an option and would be our preference, the lesion is a posterior upper pole mass on the right which may be challenging to navigate  It is primarily endophytic  The rate of conversion to radical nephrectomy given these tumor features as well as the patient's severe obesity with BMI approaching 45 is high  He understands that although our goal would be for partial, we may be forced to convert to a radical nephrectomy at the time of surgery  He understands the alternative options of biopsy and cryoablation  He understands this would be done under sedation by our Radiology colleagues and that although the cure rates are not as robust to surgery, the outcomes tend to be excellent  He understands the 2nd rounds of cryoablation can be performed but ultimately if the lesion continues to grow, radical completion nephrectomy would be required  At this point time, I would not necessarily recommend continued surveillance given the growth of the lesion since 2019 however this could be an option if performed with very close imaging surveillance  The patient and his wife understand these options and will contact me when they are ready to move forward  They do have an appointment to see Dr Chava Vazquez back in October if they have not made a decision by that point

## 2021-09-03 DIAGNOSIS — E11.9 TYPE 2 DIABETES MELLITUS WITHOUT COMPLICATION, WITH LONG-TERM CURRENT USE OF INSULIN (HCC): ICD-10-CM

## 2021-09-03 DIAGNOSIS — Z79.4 TYPE 2 DIABETES MELLITUS WITHOUT COMPLICATION, WITH LONG-TERM CURRENT USE OF INSULIN (HCC): ICD-10-CM

## 2021-09-03 DIAGNOSIS — I10 ESSENTIAL HYPERTENSION: ICD-10-CM

## 2021-09-03 RX ORDER — POTASSIUM CHLORIDE 20 MEQ/1
TABLET, EXTENDED RELEASE ORAL
Qty: 90 TABLET | Refills: 1 | Status: SHIPPED | OUTPATIENT
Start: 2021-09-03 | End: 2022-01-11 | Stop reason: SDUPTHER

## 2021-09-03 RX ORDER — PIOGLITAZONEHYDROCHLORIDE 15 MG/1
TABLET ORAL
Qty: 90 TABLET | Refills: 1 | Status: SHIPPED | OUTPATIENT
Start: 2021-09-03 | End: 2022-01-11 | Stop reason: SDUPTHER

## 2021-09-03 NOTE — TELEPHONE ENCOUNTER
Patient requesting refill(s) of: Potassium chloride and Actos    Potassium Last filled: 2/26/21 #90x1  Actos last filled: 2/12/21#90x1  Last appt: 7/6/21  Next appt: 10/18/21  [facility]: [facility]

## 2021-09-04 DIAGNOSIS — I10 ESSENTIAL HYPERTENSION: ICD-10-CM

## 2021-09-07 NOTE — TELEPHONE ENCOUNTER
Patient requesting refill(s) of: valsartan 160 mg daily    Last filled: 8/20/2021 #90 x 3  Last appt:7/6/2021  Next appt:10/18/2021  [place] [facility]

## 2021-09-09 RX ORDER — VALSARTAN 160 MG/1
TABLET ORAL
Qty: 90 TABLET | Refills: 3 | Status: SHIPPED | OUTPATIENT
Start: 2021-09-09 | End: 2022-01-05 | Stop reason: SDUPTHER

## 2021-09-16 NOTE — TELEPHONE ENCOUNTER
Patients wife Mili Bassett called in inquiring about patient freezing the mass and scheduling the surgery   Mili Bassett can be reached at [phone]

## 2021-09-16 NOTE — TELEPHONE ENCOUNTER
Patient managed by Dr Leno Figueredo at the [facility]  Patient seen for renal mass  Patient's wife, Sadaf Sharma, calling the office asking about cryoablation and" how long the freezing would last ?"  Patient wishes to proceed with cryoablation but wishes to know the answer prior to making referral to IR    Will send encounter to Dr Leno Figueredo for his advice then call Sadaf Sharma back

## 2021-09-17 ENCOUNTER — PREP FOR PROCEDURE (OUTPATIENT)
Dept: INTERVENTIONAL RADIOLOGY/VASCULAR | Facility: CLINIC | Age: 71
End: 2021-09-17

## 2021-09-17 DIAGNOSIS — N28.89 RENAL MASS, RIGHT: Primary | ICD-10-CM

## 2021-09-17 RX ORDER — SODIUM CHLORIDE 9 MG/ML
75 INJECTION, SOLUTION INTRAVENOUS CONTINUOUS
Status: CANCELLED | OUTPATIENT
Start: 2021-09-17

## 2021-09-17 NOTE — TELEPHONE ENCOUNTER
Contacted and spoke with patient's wife, Jackson Quiroz, to discuss Dr Alvarado Nose recommendations  I informed Jackson Quiroz the "freezing" would hopefully last the duration of the patient's life  Patient will be followed  with imaging and if there is any sign of recurrence or disease progression, patient would be scheduled for a second "freezing"  Jackson Quiroz states her  wishes to proceed with cryoablation  Will send encounter to [facility] asking to place referral to YANIQUE Quiroz asking for the [facility]

## 2021-09-22 NOTE — TELEPHONE ENCOUNTER
Returned call to patients wife and let her know that the IR department should be reaching out to patient  [facility] phone number provided to patient if she does not hear from them by tomorrow

## 2021-09-22 NOTE — TELEPHONE ENCOUNTER
Patient's wife called stating she has not heard from the hospital in regards to the procedure Dee Lefort spoke to her about  She wants to know how to proceed

## 2021-10-18 ENCOUNTER — OFFICE VISIT (OUTPATIENT)
Dept: FAMILY MEDICINE CLINIC | Facility: CLINIC | Age: 71
End: 2021-10-18
Payer: MEDICARE

## 2021-10-18 VITALS
BODY MASS INDEX: 45 KG/M2 | SYSTOLIC BLOOD PRESSURE: 128 MMHG | HEART RATE: 76 BPM | RESPIRATION RATE: 20 BRPM | TEMPERATURE: 98.2 F | HEIGHT: 66 IN | OXYGEN SATURATION: 98 % | DIASTOLIC BLOOD PRESSURE: 70 MMHG | WEIGHT: 280 LBS

## 2021-10-18 DIAGNOSIS — E11.9 TYPE 2 DIABETES MELLITUS WITHOUT COMPLICATION, WITHOUT LONG-TERM CURRENT USE OF INSULIN (HCC): Primary | ICD-10-CM

## 2021-10-18 DIAGNOSIS — Z23 ENCOUNTER FOR IMMUNIZATION: ICD-10-CM

## 2021-10-18 DIAGNOSIS — I10 ESSENTIAL HYPERTENSION: ICD-10-CM

## 2021-10-18 DIAGNOSIS — Z00.00 MEDICARE ANNUAL WELLNESS VISIT, SUBSEQUENT: ICD-10-CM

## 2021-10-18 DIAGNOSIS — Z12.5 SCREENING PSA (PROSTATE SPECIFIC ANTIGEN): ICD-10-CM

## 2021-10-18 DIAGNOSIS — E78.2 MIXED HYPERLIPIDEMIA: ICD-10-CM

## 2021-10-18 PROCEDURE — G0439 PPPS, SUBSEQ VISIT: HCPCS | Performed by: NURSE PRACTITIONER

## 2021-10-18 PROCEDURE — 99213 OFFICE O/P EST LOW 20 MIN: CPT | Performed by: NURSE PRACTITIONER

## 2021-10-18 PROCEDURE — G0008 ADMIN INFLUENZA VIRUS VAC: HCPCS

## 2021-10-18 PROCEDURE — 90662 IIV NO PRSV INCREASED AG IM: CPT

## 2021-10-18 RX ORDER — CEPHALEXIN 500 MG/1
CAPSULE ORAL
COMMUNITY
Start: 2021-08-09 | End: 2022-04-11

## 2021-10-20 ENCOUNTER — ANESTHESIA (OUTPATIENT)
Dept: RADIOLOGY | Facility: HOSPITAL | Age: 71
End: 2021-10-20
Payer: MEDICARE

## 2021-10-20 ENCOUNTER — HOSPITAL ENCOUNTER (OUTPATIENT)
Dept: RADIOLOGY | Facility: HOSPITAL | Age: 71
Discharge: HOME | End: 2021-10-20
Attending: RADIOLOGY | Admitting: RADIOLOGY
Payer: MEDICARE

## 2021-10-20 ENCOUNTER — ANESTHESIA EVENT (OUTPATIENT)
Dept: RADIOLOGY | Facility: HOSPITAL | Age: 71
End: 2021-10-20
Payer: MEDICARE

## 2021-10-20 VITALS
RESPIRATION RATE: 19 BRPM | HEART RATE: 74 BPM | SYSTOLIC BLOOD PRESSURE: 165 MMHG | TEMPERATURE: 97 F | OXYGEN SATURATION: 96 % | DIASTOLIC BLOOD PRESSURE: 82 MMHG | WEIGHT: 280 LBS | BODY MASS INDEX: 45 KG/M2 | HEIGHT: 66 IN

## 2021-10-20 DIAGNOSIS — N28.89 RENAL MASS, RIGHT: Primary | ICD-10-CM

## 2021-10-20 LAB
ANION GAP SERPL CALCULATED.3IONS-SCNC: 3 MMOL/L (ref 4–13)
BUN SERPL-MCNC: 18 MG/DL (ref 5–25)
CALCIUM SERPL-MCNC: 9.6 MG/DL (ref 8.3–10.1)
CHLORIDE SERPL-SCNC: 108 MMOL/L (ref 100–108)
CO2 SERPL-SCNC: 30 MMOL/L (ref 21–32)
CREAT SERPL-MCNC: 0.96 MG/DL (ref 0.6–1.3)
ERYTHROCYTE [DISTWIDTH] IN BLOOD BY AUTOMATED COUNT: 13.1 % (ref 11.6–15.1)
GFR SERPL CREATININE-BSD FRML MDRD: 79 ML/MIN/1.73SQ M
GLUCOSE P FAST SERPL-MCNC: 135 MG/DL (ref 65–99)
GLUCOSE SERPL-MCNC: 126 MG/DL (ref 65–140)
GLUCOSE SERPL-MCNC: 135 MG/DL (ref 65–140)
HCT VFR BLD AUTO: 43 % (ref 36.5–49.3)
HGB BLD-MCNC: 14.1 G/DL (ref 12–17)
INR PPP: 1 (ref 0.84–1.19)
MCH RBC QN AUTO: 30.5 PG (ref 26.8–34.3)
MCHC RBC AUTO-ENTMCNC: 32.8 G/DL (ref 31.4–37.4)
MCV RBC AUTO: 93 FL (ref 82–98)
PLATELET # BLD AUTO: 217 THOUSANDS/UL (ref 149–390)
PMV BLD AUTO: 10.3 FL (ref 8.9–12.7)
POTASSIUM SERPL-SCNC: 3.8 MMOL/L (ref 3.5–5.3)
PROTHROMBIN TIME: 12.8 SECONDS (ref 11.6–14.5)
RBC # BLD AUTO: 4.63 MILLION/UL (ref 3.88–5.62)
SODIUM SERPL-SCNC: 141 MMOL/L (ref 136–145)
WBC # BLD AUTO: 8.51 THOUSAND/UL (ref 4.31–10.16)

## 2021-10-20 PROCEDURE — 88305 TISSUE EXAM BY PATHOLOGIST: CPT | Performed by: PATHOLOGY

## 2021-10-20 PROCEDURE — 50593 PERC CRYO ABLATE RENAL TUM: CPT | Performed by: RADIOLOGY

## 2021-10-20 PROCEDURE — 50593 PERC CRYO ABLATE RENAL TUM: CPT

## 2021-10-20 PROCEDURE — 88341 IMHCHEM/IMCYTCHM EA ADD ANTB: CPT | Performed by: PATHOLOGY

## 2021-10-20 PROCEDURE — 50200 RENAL BIOPSY PERQ: CPT | Performed by: RADIOLOGY

## 2021-10-20 PROCEDURE — 80048 BASIC METABOLIC PNL TOTAL CA: CPT | Performed by: RADIOLOGY

## 2021-10-20 PROCEDURE — 88342 IMHCHEM/IMCYTCHM 1ST ANTB: CPT | Performed by: PATHOLOGY

## 2021-10-20 PROCEDURE — C2618 PROBE/NEEDLE, CRYO: HCPCS

## 2021-10-20 PROCEDURE — 77013 CT GUIDE FOR TISSUE ABLATION: CPT | Performed by: RADIOLOGY

## 2021-10-20 PROCEDURE — 50200 RENAL BIOPSY PERQ: CPT

## 2021-10-20 PROCEDURE — 77013 CT GUIDE FOR TISSUE ABLATION: CPT

## 2021-10-20 PROCEDURE — 82948 REAGENT STRIP/BLOOD GLUCOSE: CPT

## 2021-10-20 PROCEDURE — 85027 COMPLETE CBC AUTOMATED: CPT | Performed by: RADIOLOGY

## 2021-10-20 PROCEDURE — 85610 PROTHROMBIN TIME: CPT | Performed by: RADIOLOGY

## 2021-10-20 RX ORDER — PROMETHAZINE HYDROCHLORIDE 25 MG/ML
12.5 INJECTION, SOLUTION INTRAMUSCULAR; INTRAVENOUS ONCE AS NEEDED
Status: DISCONTINUED | OUTPATIENT
Start: 2021-10-20 | End: 2021-10-20 | Stop reason: HOSPADM

## 2021-10-20 RX ORDER — ROCURONIUM BROMIDE 10 MG/ML
INJECTION, SOLUTION INTRAVENOUS AS NEEDED
Status: DISCONTINUED | OUTPATIENT
Start: 2021-10-20 | End: 2021-10-20

## 2021-10-20 RX ORDER — HYDROMORPHONE HCL/PF 1 MG/ML
0.4 SYRINGE (ML) INJECTION
Status: DISCONTINUED | OUTPATIENT
Start: 2021-10-20 | End: 2021-10-20 | Stop reason: HOSPADM

## 2021-10-20 RX ORDER — SODIUM CHLORIDE 9 MG/ML
125 INJECTION, SOLUTION INTRAVENOUS CONTINUOUS
Status: DISCONTINUED | OUTPATIENT
Start: 2021-10-20 | End: 2021-10-20 | Stop reason: HOSPADM

## 2021-10-20 RX ORDER — ACETAMINOPHEN 325 MG/1
650 TABLET ORAL EVERY 4 HOURS PRN
Status: DISCONTINUED | OUTPATIENT
Start: 2021-10-20 | End: 2021-10-20 | Stop reason: HOSPADM

## 2021-10-20 RX ORDER — MEPERIDINE HYDROCHLORIDE 25 MG/ML
12.5 INJECTION INTRAMUSCULAR; INTRAVENOUS; SUBCUTANEOUS
Status: DISCONTINUED | OUTPATIENT
Start: 2021-10-20 | End: 2021-10-20 | Stop reason: HOSPADM

## 2021-10-20 RX ORDER — LABETALOL 20 MG/4 ML (5 MG/ML) INTRAVENOUS SYRINGE
10 EVERY 4 HOURS PRN
Status: DISCONTINUED | OUTPATIENT
Start: 2021-10-20 | End: 2021-10-20 | Stop reason: HOSPADM

## 2021-10-20 RX ORDER — PROPOFOL 10 MG/ML
INJECTION, EMULSION INTRAVENOUS AS NEEDED
Status: DISCONTINUED | OUTPATIENT
Start: 2021-10-20 | End: 2021-10-20

## 2021-10-20 RX ORDER — SODIUM CHLORIDE 9 MG/ML
75 INJECTION, SOLUTION INTRAVENOUS CONTINUOUS
Status: DISCONTINUED | OUTPATIENT
Start: 2021-10-20 | End: 2021-10-20 | Stop reason: HOSPADM

## 2021-10-20 RX ORDER — FENTANYL CITRATE 50 UG/ML
INJECTION, SOLUTION INTRAMUSCULAR; INTRAVENOUS AS NEEDED
Status: DISCONTINUED | OUTPATIENT
Start: 2021-10-20 | End: 2021-10-20

## 2021-10-20 RX ORDER — OXYCODONE HYDROCHLORIDE 5 MG/1
10 TABLET ORAL EVERY 4 HOURS PRN
Status: DISCONTINUED | OUTPATIENT
Start: 2021-10-20 | End: 2021-10-20 | Stop reason: HOSPADM

## 2021-10-20 RX ORDER — ONDANSETRON 2 MG/ML
INJECTION INTRAMUSCULAR; INTRAVENOUS AS NEEDED
Status: DISCONTINUED | OUTPATIENT
Start: 2021-10-20 | End: 2021-10-20

## 2021-10-20 RX ORDER — FENTANYL CITRATE/PF 50 MCG/ML
25 SYRINGE (ML) INJECTION
Status: DISCONTINUED | OUTPATIENT
Start: 2021-10-20 | End: 2021-10-20 | Stop reason: HOSPADM

## 2021-10-20 RX ORDER — OXYCODONE HYDROCHLORIDE 5 MG/1
5 TABLET ORAL EVERY 4 HOURS PRN
Status: DISCONTINUED | OUTPATIENT
Start: 2021-10-20 | End: 2021-10-20 | Stop reason: HOSPADM

## 2021-10-20 RX ORDER — HYDROMORPHONE HCL/PF 1 MG/ML
0.5 SYRINGE (ML) INJECTION EVERY 2 HOUR PRN
Status: DISCONTINUED | OUTPATIENT
Start: 2021-10-20 | End: 2021-10-20 | Stop reason: HOSPADM

## 2021-10-20 RX ORDER — OXYCODONE HYDROCHLORIDE 5 MG/1
10 TABLET ORAL EVERY 4 HOURS PRN
Qty: 30 TABLET | Refills: 0 | Status: SHIPPED | OUTPATIENT
Start: 2021-10-20 | End: 2021-10-30

## 2021-10-20 RX ORDER — SODIUM CHLORIDE 9 MG/ML
INJECTION, SOLUTION INTRAVENOUS CONTINUOUS PRN
Status: DISCONTINUED | OUTPATIENT
Start: 2021-10-20 | End: 2021-10-20

## 2021-10-20 RX ORDER — ONDANSETRON 2 MG/ML
4 INJECTION INTRAMUSCULAR; INTRAVENOUS ONCE AS NEEDED
Status: DISCONTINUED | OUTPATIENT
Start: 2021-10-20 | End: 2021-10-20 | Stop reason: HOSPADM

## 2021-10-20 RX ADMIN — FENTANYL CITRATE 50 MCG: 50 INJECTION INTRAMUSCULAR; INTRAVENOUS at 09:37

## 2021-10-20 RX ADMIN — IOHEXOL 100 ML: 350 INJECTION, SOLUTION INTRAVENOUS at 11:27

## 2021-10-20 RX ADMIN — SODIUM CHLORIDE 75 ML/HR: 0.9 INJECTION, SOLUTION INTRAVENOUS at 08:27

## 2021-10-20 RX ADMIN — ROCURONIUM BROMIDE 20 MG: 50 INJECTION, SOLUTION INTRAVENOUS at 10:26

## 2021-10-20 RX ADMIN — FENTANYL CITRATE 50 MCG: 50 INJECTION INTRAMUSCULAR; INTRAVENOUS at 10:47

## 2021-10-20 RX ADMIN — PROPOFOL 200 MG: 10 INJECTION, EMULSION INTRAVENOUS at 09:37

## 2021-10-20 RX ADMIN — ROCURONIUM BROMIDE 50 MG: 50 INJECTION, SOLUTION INTRAVENOUS at 09:37

## 2021-10-20 RX ADMIN — SODIUM CHLORIDE: 0.9 INJECTION, SOLUTION INTRAVENOUS at 09:37

## 2021-10-20 RX ADMIN — ONDANSETRON 4 MG: 2 INJECTION INTRAMUSCULAR; INTRAVENOUS at 11:29

## 2021-11-11 ENCOUNTER — OFFICE VISIT (OUTPATIENT)
Dept: UROLOGY | Facility: CLINIC | Age: 71
End: 2021-11-11
Payer: MEDICARE

## 2021-11-11 ENCOUNTER — TELEPHONE (OUTPATIENT)
Dept: OTHER | Facility: OTHER | Age: 71
End: 2021-11-11

## 2021-11-11 VITALS
HEART RATE: 70 BPM | BODY MASS INDEX: 45.19 KG/M2 | WEIGHT: 280 LBS | SYSTOLIC BLOOD PRESSURE: 136 MMHG | DIASTOLIC BLOOD PRESSURE: 80 MMHG

## 2021-11-11 DIAGNOSIS — R31.0 GROSS HEMATURIA: ICD-10-CM

## 2021-11-11 DIAGNOSIS — R35.1 BENIGN PROSTATIC HYPERPLASIA WITH NOCTURIA: ICD-10-CM

## 2021-11-11 DIAGNOSIS — C64.9 RENAL CELL CARCINOMA, UNSPECIFIED LATERALITY (HCC): ICD-10-CM

## 2021-11-11 DIAGNOSIS — N28.89 RENAL MASS, RIGHT: Primary | ICD-10-CM

## 2021-11-11 DIAGNOSIS — N40.1 BENIGN PROSTATIC HYPERPLASIA WITH NOCTURIA: ICD-10-CM

## 2021-11-11 PROCEDURE — 87077 CULTURE AEROBIC IDENTIFY: CPT | Performed by: NURSE PRACTITIONER

## 2021-11-11 PROCEDURE — 99212 OFFICE O/P EST SF 10 MIN: CPT | Performed by: NURSE PRACTITIONER

## 2021-11-11 PROCEDURE — 87186 SC STD MICRODIL/AGAR DIL: CPT | Performed by: NURSE PRACTITIONER

## 2021-11-11 PROCEDURE — 87086 URINE CULTURE/COLONY COUNT: CPT | Performed by: NURSE PRACTITIONER

## 2021-11-13 LAB — BACTERIA UR CULT: ABNORMAL

## 2021-11-15 DIAGNOSIS — E11.9 TYPE 2 DIABETES MELLITUS WITHOUT COMPLICATION, WITHOUT LONG-TERM CURRENT USE OF INSULIN (HCC): ICD-10-CM

## 2021-11-19 DIAGNOSIS — E78.2 MIXED HYPERLIPIDEMIA: ICD-10-CM

## 2021-11-19 RX ORDER — ROSUVASTATIN CALCIUM 5 MG/1
TABLET, COATED ORAL
Qty: 90 TABLET | Refills: 1 | Status: SHIPPED | OUTPATIENT
Start: 2021-11-19 | End: 2022-01-11 | Stop reason: SDUPTHER

## 2022-01-05 DIAGNOSIS — I10 ESSENTIAL HYPERTENSION: ICD-10-CM

## 2022-01-05 RX ORDER — VALSARTAN 160 MG/1
160 TABLET ORAL DAILY
Qty: 90 TABLET | Refills: 3 | Status: SHIPPED | OUTPATIENT
Start: 2022-01-05

## 2022-01-05 NOTE — TELEPHONE ENCOUNTER
Patient requesting refill(s) of: Valsartan     Last filled: 9/9/2021  Last appt: 10/18/2021  Next appt: 4/11/22  [facility]

## 2022-01-11 DIAGNOSIS — E78.2 MIXED HYPERLIPIDEMIA: ICD-10-CM

## 2022-01-11 DIAGNOSIS — I10 ESSENTIAL HYPERTENSION: ICD-10-CM

## 2022-01-11 DIAGNOSIS — Z79.4 TYPE 2 DIABETES MELLITUS WITHOUT COMPLICATION, WITH LONG-TERM CURRENT USE OF INSULIN (HCC): ICD-10-CM

## 2022-01-11 DIAGNOSIS — E11.9 TYPE 2 DIABETES MELLITUS WITHOUT COMPLICATION, WITHOUT LONG-TERM CURRENT USE OF INSULIN (HCC): ICD-10-CM

## 2022-01-11 DIAGNOSIS — E11.9 TYPE 2 DIABETES MELLITUS WITHOUT COMPLICATION, WITH LONG-TERM CURRENT USE OF INSULIN (HCC): ICD-10-CM

## 2022-01-11 RX ORDER — PIOGLITAZONEHYDROCHLORIDE 15 MG/1
15 TABLET ORAL DAILY
Qty: 90 TABLET | Refills: 3 | Status: SHIPPED | OUTPATIENT
Start: 2022-01-11

## 2022-01-11 RX ORDER — HYDROCHLOROTHIAZIDE 12.5 MG/1
12.5 TABLET ORAL DAILY
Qty: 90 TABLET | Refills: 3 | Status: SHIPPED | OUTPATIENT
Start: 2022-01-11

## 2022-01-11 RX ORDER — POTASSIUM CHLORIDE 20 MEQ/1
20 TABLET, EXTENDED RELEASE ORAL DAILY
Qty: 90 TABLET | Refills: 3 | Status: SHIPPED | OUTPATIENT
Start: 2022-01-11 | End: 2022-03-16 | Stop reason: SDUPTHER

## 2022-01-11 RX ORDER — ROSUVASTATIN CALCIUM 5 MG/1
5 TABLET, COATED ORAL DAILY
Qty: 90 TABLET | Refills: 3 | Status: SHIPPED | OUTPATIENT
Start: 2022-01-11

## 2022-01-11 RX ORDER — EZETIMIBE 10 MG/1
10 TABLET ORAL DAILY
Qty: 90 TABLET | Refills: 3 | Status: SHIPPED | OUTPATIENT
Start: 2022-01-11

## 2022-01-11 RX ORDER — AMLODIPINE BESYLATE 10 MG/1
10 TABLET ORAL DAILY
Qty: 90 TABLET | Refills: 3 | Status: SHIPPED | OUTPATIENT
Start: 2022-01-11

## 2022-01-11 NOTE — TELEPHONE ENCOUNTER
Patient requesting refill(s) of:  metformin 500 mg tab    Last filled:  11/15/21  Last appt:  10/18/21  Next appt:   4/11/22  Pharm[facility]    Pioglitazone 15 mg tabs  Last filled:  9/3/21      Hydrochlorothiazide 12 5 mg tab  Last filled:  8/27/21      Potassium chloride ER 20 meq tab  Last filled:  9/3/21      Amlodipine besylate 10 mg tab  Last filled:  3/29/21      Rosuvastatin 5 mg tab  Last filled:  11/19/21      Ezetimibe 10 mg tab  Last filled:  3/29/21

## 2022-01-21 ENCOUNTER — VBI (OUTPATIENT)
Dept: ADMINISTRATIVE | Facility: OTHER | Age: 72
End: 2022-01-21

## 2022-02-07 ENCOUNTER — TELEPHONE (OUTPATIENT)
Dept: UROLOGY | Facility: AMBULATORY SURGERY CENTER | Age: 72
End: 2022-02-07

## 2022-02-07 NOTE — TELEPHONE ENCOUNTER
Cat Scan Tech calling to say patient has scheduled test but there is no order for lab work  Needs CMP or BMP ordered and let patient know he needs to get labs done

## 2022-02-07 NOTE — TELEPHONE ENCOUNTER
Returned call and spoke with patient and his wife  Wife wanted to make sure patient did not have to drink barium prior to CT scan  Advised that IV contrast will be utilized and patient does not need to drink oral barium  They verbalized understanding and patient will have BMP prior to CT scan

## 2022-02-07 NOTE — TELEPHONE ENCOUNTER
Returned call to patient advised lab blood work order was in the system   Patient will go for blood work before CT

## 2022-02-09 ENCOUNTER — VBI (OUTPATIENT)
Dept: ADMINISTRATIVE | Facility: OTHER | Age: 72
End: 2022-02-09

## 2022-02-09 ENCOUNTER — APPOINTMENT (OUTPATIENT)
Dept: LAB | Facility: CLINIC | Age: 72
End: 2022-02-09
Payer: MEDICARE

## 2022-02-09 DIAGNOSIS — I10 ESSENTIAL HYPERTENSION: ICD-10-CM

## 2022-02-09 DIAGNOSIS — E78.2 MIXED HYPERLIPIDEMIA: ICD-10-CM

## 2022-02-09 DIAGNOSIS — N28.89 RENAL MASS, RIGHT: ICD-10-CM

## 2022-02-09 DIAGNOSIS — E11.9 TYPE 2 DIABETES MELLITUS WITHOUT COMPLICATION, WITHOUT LONG-TERM CURRENT USE OF INSULIN (HCC): ICD-10-CM

## 2022-02-09 DIAGNOSIS — Z12.5 SCREENING PSA (PROSTATE SPECIFIC ANTIGEN): ICD-10-CM

## 2022-02-09 LAB
ALBUMIN SERPL BCP-MCNC: 3.7 G/DL (ref 3.5–5)
ALP SERPL-CCNC: 75 U/L (ref 46–116)
ALT SERPL W P-5'-P-CCNC: 35 U/L (ref 12–78)
ANION GAP SERPL CALCULATED.3IONS-SCNC: 4 MMOL/L (ref 4–13)
AST SERPL W P-5'-P-CCNC: 21 U/L (ref 5–45)
BILIRUB SERPL-MCNC: 0.59 MG/DL (ref 0.2–1)
BUN SERPL-MCNC: 18 MG/DL (ref 5–25)
CALCIUM SERPL-MCNC: 9.5 MG/DL (ref 8.3–10.1)
CHLORIDE SERPL-SCNC: 107 MMOL/L (ref 100–108)
CHOLEST SERPL-MCNC: 111 MG/DL
CO2 SERPL-SCNC: 27 MMOL/L (ref 21–32)
CREAT SERPL-MCNC: 1.01 MG/DL (ref 0.6–1.3)
EST. AVERAGE GLUCOSE BLD GHB EST-MCNC: 140 MG/DL
GFR SERPL CREATININE-BSD FRML MDRD: 74 ML/MIN/1.73SQ M
GLUCOSE P FAST SERPL-MCNC: 131 MG/DL (ref 65–99)
HBA1C MFR BLD: 6.5 %
HDLC SERPL-MCNC: 35 MG/DL
LDLC SERPL CALC-MCNC: 49 MG/DL (ref 0–100)
NONHDLC SERPL-MCNC: 76 MG/DL
POTASSIUM SERPL-SCNC: 3.9 MMOL/L (ref 3.5–5.3)
PROT SERPL-MCNC: 7.6 G/DL (ref 6.4–8.2)
SODIUM SERPL-SCNC: 138 MMOL/L (ref 136–145)
TRIGL SERPL-MCNC: 133 MG/DL

## 2022-02-09 PROCEDURE — 80053 COMPREHEN METABOLIC PANEL: CPT

## 2022-02-09 PROCEDURE — 83036 HEMOGLOBIN GLYCOSYLATED A1C: CPT

## 2022-02-09 PROCEDURE — 36415 COLL VENOUS BLD VENIPUNCTURE: CPT

## 2022-02-09 PROCEDURE — 80061 LIPID PANEL: CPT

## 2022-02-10 ENCOUNTER — VBI (OUTPATIENT)
Dept: ADMINISTRATIVE | Facility: OTHER | Age: 72
End: 2022-02-10

## 2022-02-14 ENCOUNTER — HOSPITAL ENCOUNTER (OUTPATIENT)
Dept: CT IMAGING | Facility: HOSPITAL | Age: 72
Discharge: HOME | End: 2022-02-14
Payer: MEDICARE

## 2022-02-14 DIAGNOSIS — N28.89 RENAL MASS, RIGHT: ICD-10-CM

## 2022-02-14 DIAGNOSIS — C64.9 RENAL CELL CARCINOMA, UNSPECIFIED LATERALITY (HCC): ICD-10-CM

## 2022-02-14 PROCEDURE — 71250 CT THORAX DX C-: CPT

## 2022-02-14 PROCEDURE — G1004 CDSM NDSC: HCPCS

## 2022-02-14 PROCEDURE — 74170 CT ABD WO CNTRST FLWD CNTRST: CPT

## 2022-02-14 RX ADMIN — IOHEXOL 100 ML: 350 INJECTION, SOLUTION INTRAVENOUS at 10:49

## 2022-02-18 ENCOUNTER — TELEPHONE (OUTPATIENT)
Dept: UROLOGY | Facility: CLINIC | Age: 72
End: 2022-02-18

## 2022-02-18 NOTE — RESULT ENCOUNTER NOTE
Please let patient know his recent imaging appears stable    We will further discuss this at his upcoming appointment

## 2022-02-18 NOTE — TELEPHONE ENCOUNTER
Called and spoke with patients wife  Aware of CT results and that they will be discussed in further detail at patients upcoming appointment as well

## 2022-03-03 ENCOUNTER — OFFICE VISIT (OUTPATIENT)
Dept: UROLOGY | Facility: CLINIC | Age: 72
End: 2022-03-03
Payer: MEDICARE

## 2022-03-03 VITALS — BODY MASS INDEX: 46 KG/M2 | WEIGHT: 285 LBS

## 2022-03-03 DIAGNOSIS — R91.1 LUNG NODULE SEEN ON IMAGING STUDY: ICD-10-CM

## 2022-03-03 DIAGNOSIS — R31.0 GROSS HEMATURIA: Primary | ICD-10-CM

## 2022-03-03 DIAGNOSIS — C64.9 RENAL CELL CARCINOMA, UNSPECIFIED LATERALITY (HCC): ICD-10-CM

## 2022-03-03 DIAGNOSIS — R35.1 BENIGN PROSTATIC HYPERPLASIA WITH NOCTURIA: ICD-10-CM

## 2022-03-03 DIAGNOSIS — N40.1 BENIGN PROSTATIC HYPERPLASIA WITH NOCTURIA: ICD-10-CM

## 2022-03-03 DIAGNOSIS — N39.41 URGE INCONTINENCE: ICD-10-CM

## 2022-03-03 LAB
BACTERIA UR QL AUTO: ABNORMAL /HPF
BILIRUB UR QL STRIP: NEGATIVE
CLARITY UR: CLEAR
COLOR UR: YELLOW
GLUCOSE UR STRIP-MCNC: NEGATIVE MG/DL
HGB UR QL STRIP.AUTO: NEGATIVE
HYALINE CASTS #/AREA URNS LPF: ABNORMAL /LPF
KETONES UR STRIP-MCNC: NEGATIVE MG/DL
LEUKOCYTE ESTERASE UR QL STRIP: NEGATIVE
MUCOUS THREADS UR QL AUTO: ABNORMAL
NITRITE UR QL STRIP: NEGATIVE
NON-SQ EPI CELLS URNS QL MICRO: ABNORMAL /HPF
PH UR STRIP.AUTO: 6 [PH]
PROT UR STRIP-MCNC: ABNORMAL MG/DL
RBC #/AREA URNS AUTO: ABNORMAL /HPF
SP GR UR STRIP.AUTO: 1.02 (ref 1–1.03)
UROBILINOGEN UR STRIP-ACNC: <2 MG/DL
WBC #/AREA URNS AUTO: ABNORMAL /HPF

## 2022-03-03 PROCEDURE — 81001 URINALYSIS AUTO W/SCOPE: CPT | Performed by: NURSE PRACTITIONER

## 2022-03-03 PROCEDURE — 99213 OFFICE O/P EST LOW 20 MIN: CPT | Performed by: NURSE PRACTITIONER

## 2022-03-03 RX ORDER — TAMSULOSIN HYDROCHLORIDE 0.4 MG/1
0.4 CAPSULE ORAL
Qty: 90 CAPSULE | Refills: 3 | Status: SHIPPED | OUTPATIENT
Start: 2022-03-03

## 2022-03-03 NOTE — ASSESSMENT & PLAN NOTE
· Status post cryoablation 10/20/2021  · Pathology revealed papillary renal cell carcinoma, type 1  · CT with fluid attenuation without appreciable enhancement, no evidence of residual recurrent tumor  · CT abdomen 1 year

## 2022-03-03 NOTE — ASSESSMENT & PLAN NOTE
· Possibly secondary to riding 4 ramirez status post cryoablation  · No further episodes  · Send urine microscopic  · Follow-up 1 year MODERATE

## 2022-03-03 NOTE — ASSESSMENT & PLAN NOTE
· Continue Flomax 0 4 milligrams daily  · AUA 3  · Avoid bladder irritants and increase water intake  · Follow-up 1 year

## 2022-03-03 NOTE — PROGRESS NOTES
Assessment and plan:     Renal cell carcinoma (HCC)  · Status post cryoablation 10/20/2021  · Pathology revealed papillary renal cell carcinoma, type 1  · CT with fluid attenuation without appreciable enhancement, no evidence of residual recurrent tumor  · CT abdomen 1 year    Gross hematuria  · Possibly secondary to riding 4 ramirez status post cryoablation  · No further episodes  · Send urine microscopic  · Follow-up 1 year    Benign prostatic hyperplasia with nocturia  · Continue Flomax 0 4 milligrams daily  · AUA 3  · Avoid bladder irritants and increase water intake  · Follow-up 1 year    Lung nodule seen on imaging study  · Was not noted on CT of chest 02/14/2022  · CT chest 1 year      BRANDI Hicks    History of Present Illness     Deo Sullivan is a 70 y o  male patient Dr Shazia Reich with a history of morbid obesity and diabetes, patient a history renal cystic disease   Patient had an MRI of the abdomen with and without contrast in 2019 at an outside institution which demonstrated right upper pole hemorrhagic cyst 1 2 cm   Patient then had an ultrasound that demonstrated progression of this concern with a renal protocol CT with questionable hypoenhancement  He underwent IR cryoablation and biopsy 10/20/2021 which revealed papillary renal cell neoplasm  Dr Shazia Reich reviewed the pathology and recommended CT of chest without and CT of abdomen with and without contrast in 3 months to follow-up  He denies any pain or concerns  Feels well post procedure  At his previous visit 11/11/2021, < 1 month post cryoablation he was 4 willing and developed gross hematuria  He was advised not to ride his 4 ramirez for at least 2 weeks and then resume slowly  He did not require evaluation/procedure by Interventional Radiology for this  He denies any recurrent episodes of gross hematuria      He has bph and is on flomax for the same  Nocturia x1-2 , down from 4   He increased his water intake and decreased soda intake  IPSS Questionnaire (AUA-7): Over the past month    1)  How often have you had a sensation of not emptying your bladder completely after you finish urinating? 0 - Not at all   2)  How often have you had to urinate again less than two hours after you finished urinating? 0 - Not at all   3)  How often have you found you stopped and started again several times when you urinated? 0 - Not at all   4) How difficult have you found it to postpone urination? 0 - Not at all   5) How often have you had a weak urinary stream?  1 - Less than 1 time in 5   6) How often have you had to push or strain to begin urination? 0 - Not at all   7) How many times did you most typically get up to urinate from the time you went to bed until the time you got up in the morning?   2 - 2 times   Total score:  0-7 mildly symptomatic    8-19 moderately symptomatic    20-35 severely symptomatic       Laboratory     Lab Results   Component Value Date    BUN 18 02/09/2022    CREATININE 1 01 02/09/2022       No components found for: GFR    Lab Results   Component Value Date    CALCIUM 9 5 02/09/2022     05/02/2018    K 3 9 02/09/2022    CO2 27 02/09/2022     02/09/2022       Lab Results   Component Value Date    WBC 8 51 10/20/2021    HGB 14 1 10/20/2021    HCT 43 0 10/20/2021    MCV 93 10/20/2021     10/20/2021       Lab Results   Component Value Date    PSA 0 4 03/16/2021    PSA 0 5 02/24/2020    PSA 0 57 05/02/2018     Case Report   Surgical Pathology Report                         Case: [id number]                                    Authorizing Provider: Grisel Daigle MD   Collected:           10/20/2021 1049               Ordering Location:     [facility]      Received:            10/20/2021 [address] [place] [address] Short Stay Center                                                    Pathologist:           Jaylene Bermudez MD                                                          Specimen:    Kidney, Right, right renal mass                                                            Final Diagnosis   A  Right renal mass:  - Papillary renal cell neoplasm, favor papillary renal cell carcinoma, type I (assuming tumor is > 15 millimeters diameter, as suggested by ultrasound studies - see Note)  No results found for this or any previous visit (from the past 1 hour(s))  @RESULT(URINEMICROSCOPIC)@    @RESULT(URINECULTURE)@    Radiology   CT CHEST WITHOUT IV CONTRAST 02/14/2022     INDICATION:   N28 89: Other specified disorders of kidney and ureter  C64 9: Malignant neoplasm of unspecified kidney, except renal pelvis      COMPARISON:  None      TECHNIQUE: CT examination of the chest was performed without intravenous contrast   Axial, sagittal, and coronal 2D reformatted images were created from the source data and submitted for interpretation      Radiation dose length product (DLP) for this visit:  860 26 mGy-cm   This examination, like all CT scans performed in the [facility], was performed utilizing techniques to minimize radiation dose exposure, including the use of iterative   reconstruction and automated exposure control       FINDINGS:     LUNGS:  Lungs are clear  There is no tracheal or endobronchial lesion      PLEURA:  Unremarkable      HEART/GREAT VESSELS: Heavy atherosclerotic coronary artery calcification is noted  Heart is otherwise unremarkable  No thoracic aortic aneurysm      MEDIASTINUM AND SUJIT:  Unremarkable      CHEST WALL AND LOWER NECK:   Unremarkable      VISUALIZED STRUCTURES IN THE UPPER ABDOMEN:  Unremarkable      OSSEOUS STRUCTURES:  No acute fracture or destructive osseous lesion      IMPRESSION:     No evidence of suspicious pulmonary nodule  No evidence of acute intrathoracic pathology      CT - ABDOMEN WITHOUT AND WITH IV CONTRAST 02/14/2022     INDICATION:   N28 89:  Other specified disorders of kidney and ureter  C64 9: Malignant neoplasm of unspecified kidney, except renal pelvis      COMPARISON: 8/16/2021      TECHNIQUE: CT examination of the abdomen was performed  Reformatted images were created in axial, sagittal, and coronal planes        Radiation dose length product (DLP) for this visit:  3399 51 mGy-cm   This examination, like all CT scans performed in the [facility], was performed utilizing techniques to minimize radiation dose exposure, including the use of   iterative reconstruction and automated exposure control      IV Contrast:  100 mL of iohexol (OMNIPAQUE)  Enteric Contrast:  None     FINDINGS:     ABDOMEN     LOWER CHEST:  No clinically significant abnormality identified in the visualized lower chest      LIVER/BILIARY TREE:  Unremarkable      GALLBLADDER:  Gallbladder is surgically absent      SPLEEN:  Unremarkable      PANCREAS:  Unremarkable      ADRENAL GLANDS:  Unremarkable      KIDNEYS/URETERS:  Low-attenuation focus at the superior pole the right kidney corresponding to previous site of right upper pole renal mass does not demonstrate appreciable enhancement  Surrounding inflammatory change within the perirenal fat  Small   bilateral parapelvic cysts      VISUALIZED STOMACH AND BOWEL:  Unremarkable      VISUALIZED ABDOMINAL CAVITY:  No pathologically enlarged periportal, mesenteric or upper retroperitoneal lymph nodes  No ascites      VISUALIZED BODY WALL:  Unremarkable      VESSELS:  Unremarkable for patient's age      OSSEOUS STRUCTURES:  No acute fracture or destructive osseous lesion      IMPRESSION:     Right upper pole ablation zone demonstrates fluid attenuation without appreciable enhancement  No evidence of residual or recurrent tumor      Review of Systems     Review of Systems   Constitutional: Negative for activity change, appetite change, chills, fatigue, fever and unexpected weight change  HENT: Negative for facial swelling  Eyes: Negative for discharge  Respiratory: Negative  Negative for cough and shortness of breath  Cardiovascular: Negative for chest pain and leg swelling  Gastrointestinal: Negative  Negative for abdominal distention, abdominal pain, constipation, diarrhea, nausea and vomiting  Endocrine: Negative  Genitourinary: Negative  Negative for decreased urine volume, difficulty urinating, dysuria, enuresis, flank pain, frequency, genital sores, hematuria and urgency  Post void dribbling   Musculoskeletal: Negative for back pain and myalgias  Skin: Negative for pallor and rash  Allergic/Immunologic: Negative  Negative for immunocompromised state  Neurological: Negative for facial asymmetry and speech difficulty  Psychiatric/Behavioral: Negative for agitation and confusion  Allergies     No Known Allergies    Physical Exam     Physical Exam  Vitals reviewed  Constitutional:       General: He is not in acute distress  Appearance: Normal appearance  He is obese  He is not ill-appearing, toxic-appearing or diaphoretic  HENT:      Head: Normocephalic and atraumatic  Eyes:      General: No scleral icterus  Cardiovascular:      Rate and Rhythm: Normal rate  Pulmonary:      Effort: Pulmonary effort is normal  No respiratory distress  Abdominal:      General: Abdomen is flat  There is no distension  Palpations: Abdomen is soft  Tenderness: There is no abdominal tenderness  There is no right CVA tenderness, left CVA tenderness, guarding or rebound  Musculoskeletal:         General: No swelling  Cervical back: Normal range of motion  Skin:     General: Skin is warm and dry  Coloration: Skin is not jaundiced or pale  Findings: No rash  Neurological:      General: No focal deficit present  Mental Status: He is alert and oriented to person, place, and time        Gait: Gait normal    Psychiatric:         Mood and Affect: Mood normal          Behavior: Behavior normal  Thought Content: Thought content normal          Judgment: Judgment normal          Vital Signs     Vitals:    03/03/22 1403   Weight: 129 kg (285 lb)       Current Medications       Current Outpatient Medications:     amLODIPine (NORVASC) 10 mg tablet, Take 1 tablet (10 mg total) by mouth daily, Disp: 90 tablet, Rfl: 3    NELLY ASPIRIN EC LOW DOSE PO, Take 81 mg by mouth daily, Disp: , Rfl:     ezetimibe (ZETIA) 10 mg tablet, Take 1 tablet (10 mg total) by mouth daily, Disp: 90 tablet, Rfl: 3    glucose blood test strip, Use to test sugars four times daily CONTOUR TEST STRIP, Disp: 100 each, Rfl: 5    hydrochlorothiazide (HYDRODIURIL) 12 5 mg tablet, Take 1 tablet (12 5 mg total) by mouth daily, Disp: 90 tablet, Rfl: 3    metFORMIN (GLUCOPHAGE) 500 mg tablet, Take 2 tablets (1,000 mg total) by mouth 2 (two) times a day with meals 2 In the  Morning, 1 At Night, Disp: 180 tablet, Rfl: 3    Multiple Vitamins-Minerals (CENTRUM SILVER 50+MEN PO), Centrum Silver, Disp: , Rfl:     OneTouch Delica Lancets 52L MISC, Use to test sugars three times daily   E11 9, Disp: 100 each, Rfl: 5    pioglitazone (ACTOS) 15 mg tablet, Take 1 tablet (15 mg total) by mouth daily, Disp: 90 tablet, Rfl: 3    potassium chloride (K-DUR,KLOR-CON) 20 mEq tablet, Take 1 tablet (20 mEq total) by mouth daily, Disp: 90 tablet, Rfl: 3    Probiotic Product (PROBIOTIC ADVANCED PO), Take by mouth, Disp: , Rfl:     rosuvastatin (CRESTOR) 5 mg tablet, Take 1 tablet (5 mg total) by mouth daily, Disp: 90 tablet, Rfl: 3    tamsulosin (FLOMAX) 0 4 mg, Take 1 capsule (0 4 mg total) by mouth daily with dinner, Disp: 90 capsule, Rfl: 3    valsartan (DIOVAN) 160 mg tablet, Take 1 tablet (160 mg total) by mouth daily, Disp: 90 tablet, Rfl: 3    cephalexin (KEFLEX) 500 mg capsule, take 1 capsule by mouth twice a day for 7 days (Patient not taking: Reported on 3/3/2022), Disp: , Rfl:     Active Problems     Patient Active Problem List   Diagnosis    Encounter for diabetic foot exam ([facility] )    Morbid obesity with BMI of 40 0-44 9, adult ([place] [address] )    Hypertension    Lung nodule seen on imaging study    Mixed hyperlipidemia    Renal mass, right    Edema    Hyperaldosteronism ([facility] )    Type 2 diabetes mellitus without complication, without long-term current use of insulin ([facility] )    Gross hematuria    Renal cell carcinoma (HCC)    Benign prostatic hyperplasia with nocturia       Past Medical History     Past Medical History:   Diagnosis Date    Carotid artery occlusion     Disease of thyroid gland     Hypertension     Mixed hyperlipidemia     Peripheral vascular disease ([place] [address] )     Renal mass        Surgical History     Past Surgical History:   Procedure Laterality Date    APPENDECTOMY      CARPAL TUNNEL RELEASE      CHOLECYSTECTOMY      COLONOSCOPY  2014 12 polyps     IR CRYOABLATION  10/20/2021    JOINT REPLACEMENT Left     TKR    JOINT REPLACEMENT Right     Total knee replacement     SHOULDER SURGERY Left     repair        Family History     Family History   Problem Relation Age of Onset    Breast cancer Mother     Heart disease Father     Stroke Father     Breast cancer Sister     Sudden death Brother         MVA    Obesity Brother     Diabetes Brother     Obesity Brother        Social History     Social History     Social History     Tobacco Use   Smoking Status Never Smoker   Smokeless Tobacco Never Used   Tobacco Comment    Former smoker - As per [employer]        Past Surgical History:   Procedure Laterality Date    APPENDECTOMY      CARPAL TUNNEL RELEASE      CHOLECYSTECTOMY      COLONOSCOPY  2014    12 polyps     IR CRYOABLATION  10/20/2021    JOINT REPLACEMENT Left     TKR    JOINT REPLACEMENT Right     Total knee replacement     SHOULDER SURGERY Left     repair          The following portions of the patient's history were reviewed and updated as appropriate: allergies, current medications, past family history, past medical history, past social history, past surgical history and problem list    Please note :  Voice dictation software has been used to create this document  There may be inadvertent transcription errors      [id number] [place] [id number] Ralf Villegas

## 2022-03-04 ENCOUNTER — TELEPHONE (OUTPATIENT)
Dept: UROLOGY | Facility: CLINIC | Age: 72
End: 2022-03-04

## 2022-03-04 NOTE — TELEPHONE ENCOUNTER
----- Message from [facility] [employer] sent at 3/4/2022  8:21 AM EST -----  Please let patient know his urine testing did not reveal any microscopic blood

## 2022-03-04 NOTE — TELEPHONE ENCOUNTER
Called and spoke with patient  Informed patient that urinalysis does not show any blood  Patient verbalized understanding

## 2022-03-11 DIAGNOSIS — I10 ESSENTIAL HYPERTENSION: ICD-10-CM

## 2022-03-16 DIAGNOSIS — I10 ESSENTIAL HYPERTENSION: ICD-10-CM

## 2022-03-16 RX ORDER — POTASSIUM CHLORIDE 20 MEQ/1
20 TABLET, EXTENDED RELEASE ORAL DAILY
Qty: 14 TABLET | Refills: 0 | Status: SHIPPED | OUTPATIENT
Start: 2022-03-16 | End: 2022-03-17 | Stop reason: SDUPTHER

## 2022-03-16 NOTE — TELEPHONE ENCOUNTER
Patient requesting refill(s) of: potassium chloride 20 mEq daily    Last filled: 1/11/2022 #90 x 3  Last appt:10/18/2021  Next appt:4/11/2022  [facility]    Wife is requesting short supply to go to [facility] as he is out of medication  Wife will call when they pick this up so we can send request for Mail Order  Please advise

## 2022-03-17 RX ORDER — POTASSIUM CHLORIDE 20 MEQ/1
20 TABLET, EXTENDED RELEASE ORAL DAILY
Qty: 90 TABLET | Refills: 3 | Status: SHIPPED | OUTPATIENT
Start: 2022-03-17

## 2022-03-31 ENCOUNTER — APPOINTMENT (OUTPATIENT)
Dept: LAB | Facility: CLINIC | Age: 72
End: 2022-03-31
Payer: MEDICARE

## 2022-03-31 DIAGNOSIS — E78.2 MIXED HYPERLIPIDEMIA: ICD-10-CM

## 2022-03-31 DIAGNOSIS — I10 ESSENTIAL HYPERTENSION: ICD-10-CM

## 2022-03-31 DIAGNOSIS — C64.9 RENAL CELL CARCINOMA, UNSPECIFIED LATERALITY (HCC): ICD-10-CM

## 2022-03-31 LAB
BASOPHILS # BLD AUTO: 0.06 THOUSANDS/ΜL (ref 0–0.1)
BASOPHILS NFR BLD AUTO: 1 % (ref 0–1)
EOSINOPHIL # BLD AUTO: 0.31 THOUSAND/ΜL (ref 0–0.61)
EOSINOPHIL NFR BLD AUTO: 4 % (ref 0–6)
ERYTHROCYTE [DISTWIDTH] IN BLOOD BY AUTOMATED COUNT: 13.1 % (ref 11.6–15.1)
HCT VFR BLD AUTO: 42.7 % (ref 36.5–49.3)
HGB BLD-MCNC: 14.1 G/DL (ref 12–17)
IMM GRANULOCYTES # BLD AUTO: 0.03 THOUSAND/UL (ref 0–0.2)
IMM GRANULOCYTES NFR BLD AUTO: 0 % (ref 0–2)
LYMPHOCYTES # BLD AUTO: 1.63 THOUSANDS/ΜL (ref 0.6–4.47)
LYMPHOCYTES NFR BLD AUTO: 22 % (ref 14–44)
MCH RBC QN AUTO: 30.5 PG (ref 26.8–34.3)
MCHC RBC AUTO-ENTMCNC: 33 G/DL (ref 31.4–37.4)
MCV RBC AUTO: 92 FL (ref 82–98)
MONOCYTES # BLD AUTO: 0.78 THOUSAND/ΜL (ref 0.17–1.22)
MONOCYTES NFR BLD AUTO: 11 % (ref 4–12)
NEUTROPHILS # BLD AUTO: 4.62 THOUSANDS/ΜL (ref 1.85–7.62)
NEUTS SEG NFR BLD AUTO: 62 % (ref 43–75)
NRBC BLD AUTO-RTO: 0 /100 WBCS
PLATELET # BLD AUTO: 217 THOUSANDS/UL (ref 149–390)
PMV BLD AUTO: 10.7 FL (ref 8.9–12.7)
PSA SERPL-MCNC: 0.5 NG/ML (ref 0–4)
RBC # BLD AUTO: 4.63 MILLION/UL (ref 3.88–5.62)
WBC # BLD AUTO: 7.43 THOUSAND/UL (ref 4.31–10.16)

## 2022-03-31 PROCEDURE — 36415 COLL VENOUS BLD VENIPUNCTURE: CPT

## 2022-03-31 PROCEDURE — 85025 COMPLETE CBC W/AUTO DIFF WBC: CPT

## 2022-03-31 PROCEDURE — G0103 PSA SCREENING: HCPCS

## 2022-04-11 ENCOUNTER — OFFICE VISIT (OUTPATIENT)
Dept: FAMILY MEDICINE CLINIC | Facility: CLINIC | Age: 72
End: 2022-04-11
Payer: MEDICARE

## 2022-04-11 VITALS
HEART RATE: 85 BPM | TEMPERATURE: 98.7 F | WEIGHT: 282 LBS | SYSTOLIC BLOOD PRESSURE: 128 MMHG | OXYGEN SATURATION: 98 % | BODY MASS INDEX: 45.32 KG/M2 | RESPIRATION RATE: 20 BRPM | DIASTOLIC BLOOD PRESSURE: 68 MMHG | HEIGHT: 66 IN

## 2022-04-11 DIAGNOSIS — E11.9 ENCOUNTER FOR DIABETIC FOOT EXAM (HCC): ICD-10-CM

## 2022-04-11 DIAGNOSIS — R01.1 HEART MURMUR: ICD-10-CM

## 2022-04-11 DIAGNOSIS — E66.01 MORBID OBESITY WITH BMI OF 40.0-44.9, ADULT (HCC): ICD-10-CM

## 2022-04-11 DIAGNOSIS — E11.9 TYPE 2 DIABETES MELLITUS WITHOUT COMPLICATION, WITHOUT LONG-TERM CURRENT USE OF INSULIN (HCC): Primary | ICD-10-CM

## 2022-04-11 DIAGNOSIS — I10 ESSENTIAL HYPERTENSION: ICD-10-CM

## 2022-04-11 DIAGNOSIS — E78.2 MIXED HYPERLIPIDEMIA: ICD-10-CM

## 2022-04-11 DIAGNOSIS — C64.9 RENAL CELL CARCINOMA, UNSPECIFIED LATERALITY (HCC): ICD-10-CM

## 2022-04-11 PROCEDURE — 99214 OFFICE O/P EST MOD 30 MIN: CPT | Performed by: NURSE PRACTITIONER

## 2022-04-11 NOTE — PROGRESS NOTES
[facility]        NAME: Geovanna Hogan is a 70 y o  male  : 1950    MRN: [id number]  DATE: 2022  TIME: 1:30 PM    Assessment and Plan   Type 2 diabetes mellitus without complication, without long-term current use of insulin ([facility] ) [E11 9]  1  Type 2 diabetes mellitus without complication, without long-term current use of insulin ([facility])  HEMOGLOBIN A1C W/ EAG ESTIMATION    CBC and differential    Microalbumin / creatinine urine ratio   2  Mixed hyperlipidemia  Lipid panel   3  Essential hypertension  Comprehensive metabolic panel   4  Morbid obesity with BMI of 40 0-44 9, adult ([facility] )     5  Encounter for diabetic foot exam ([place] [address] )     6  Renal cell carcinoma, unspecified laterality ([place] [address] )     7  Heart murmur           Patient Instructions     Patient Instructions   Discussed counting calories for weight loss  Get labs before next visit  Continue same medications  Call for problems/concerns          Chief Complaint     Chief Complaint   Patient presents with    Follow-up         History of Present Illness       Here for 6 month medcheck-   He denies complaints  Reviewed recent labs  Discussed weight loss with counting calories  Review of Systems   Review of Systems   Constitutional: Negative for activity change, diaphoresis, fatigue and fever  HENT: Negative for congestion, facial swelling, hearing loss, rhinorrhea, sinus pressure, sinus pain, sneezing, sore throat and voice change  Eyes: Negative for discharge and visual disturbance  Respiratory: Negative for cough, choking, chest tightness, shortness of breath, wheezing and stridor  Cardiovascular: Negative for chest pain, palpitations and leg swelling  Gastrointestinal: Negative for abdominal distention, abdominal pain, constipation, diarrhea, nausea and vomiting  Endocrine: Negative for polydipsia, polyphagia and polyuria  Genitourinary: Negative for difficulty urinating, dysuria, frequency and urgency  Musculoskeletal: Negative for arthralgias, back pain, gait problem, joint swelling, myalgias, neck pain and neck stiffness  Skin: Negative for color change, rash and wound  Neurological: Negative for dizziness, syncope, speech difficulty, weakness, light-headedness and headaches  Hematological: Negative for adenopathy  Does not bruise/bleed easily  Psychiatric/Behavioral: Negative for agitation, behavioral problems, confusion, hallucinations, sleep disturbance and suicidal ideas  The patient is not nervous/anxious  Current Medications       Current Outpatient Medications:     amLODIPine (NORVASC) 10 mg tablet, Take 1 tablet (10 mg total) by mouth daily, Disp: 90 tablet, Rfl: 3    NELLY ASPIRIN EC LOW DOSE PO, Take 81 mg by mouth daily, Disp: , Rfl:     ezetimibe (ZETIA) 10 mg tablet, Take 1 tablet (10 mg total) by mouth daily, Disp: 90 tablet, Rfl: 3    glucose blood test strip, Use to test sugars four times daily CONTOUR TEST STRIP, Disp: 100 each, Rfl: 5    hydrochlorothiazide (HYDRODIURIL) 12 5 mg tablet, Take 1 tablet (12 5 mg total) by mouth daily, Disp: 90 tablet, Rfl: 3    metFORMIN (GLUCOPHAGE) 500 mg tablet, Take 2 tablets (1,000 mg total) by mouth 2 (two) times a day with meals 2 In the  Morning, 1 At Night, Disp: 180 tablet, Rfl: 3    Multiple Vitamins-Minerals (CENTRUM SILVER 50+MEN PO), Centrum Silver, Disp: , Rfl:     OneTouch Delica Lancets 44V MISC, Use to test sugars three times daily   E11 9, Disp: 100 each, Rfl: 5    pioglitazone (ACTOS) 15 mg tablet, Take 1 tablet (15 mg total) by mouth daily, Disp: 90 tablet, Rfl: 3    potassium chloride (K-DUR,KLOR-CON) 20 mEq tablet, Take 1 tablet (20 mEq total) by mouth daily, Disp: 90 tablet, Rfl: 3    Probiotic Product (PROBIOTIC ADVANCED PO), Take by mouth, Disp: , Rfl:     rosuvastatin (CRESTOR) 5 mg tablet, Take 1 tablet (5 mg total) by mouth daily, Disp: 90 tablet, Rfl: 3    tamsulosin (FLOMAX) 0 4 mg, Take 1 capsule (0 4 mg total) by mouth daily with dinner, Disp: 90 capsule, Rfl: 3    valsartan (DIOVAN) 160 mg tablet, Take 1 tablet (160 mg total) by mouth daily, Disp: 90 tablet, Rfl: 3    Current Allergies     Allergies as of 04/11/2022    (No Known Allergies)            The following portions of the patient's history were reviewed and updated as appropriate: allergies, current medications, past family history, past medical history, past social history, past surgical history and problem list      Past Medical History:   Diagnosis Date    Carotid artery occlusion     Disease of thyroid gland     Hypertension     Mixed hyperlipidemia     Peripheral vascular disease ([address] )     Renal mass        Past Surgical History:   Procedure Laterality Date    APPENDECTOMY      CARPAL TUNNEL RELEASE      CHOLECYSTECTOMY      COLONOSCOPY  2014 12 polyps     IR CRYOABLATION  10/20/2021    JOINT REPLACEMENT Left     TKR    JOINT REPLACEMENT Right     Total knee replacement     SHOULDER SURGERY Left     repair        Family History   Problem Relation Age of Onset    Breast cancer Mother     Heart disease Father     Stroke Father     Breast cancer Sister     Sudden death Brother         MVA    Obesity Brother     Diabetes Brother     Obesity Brother          Medications have been verified  Objective   /68   Pulse 85   Temp 98 7 °F (37 1 °C) (Tympanic)   Resp 20   Ht 5' 6" (1 676 m)   Wt 128 kg (282 lb)   SpO2 98%   BMI 45 52 kg/m²        Physical Exam     Physical Exam  Vitals and nursing note reviewed  Constitutional:       General: He is not in acute distress  Appearance: He is well-developed  He is obese  He is not diaphoretic  Neck:      Thyroid: No thyromegaly  Trachea: No tracheal deviation  Cardiovascular:      Rate and Rhythm: Normal rate and regular rhythm  Pulses: no weak pulses          Dorsalis pedis pulses are 2+ on the right side and 2+ on the left side        Heart sounds: Murmur heard  Systolic murmur is present with a grade of 1/6  Pulmonary:      Effort: Pulmonary effort is normal  No respiratory distress  Breath sounds: Normal breath sounds  No wheezing  Musculoskeletal:         General: No tenderness or deformity  Normal range of motion  Cervical back: Normal range of motion and neck supple  Right lower leg: No edema  Left lower leg: No edema  Feet:      Right foot:      Skin integrity: Dry skin present  No ulcer, skin breakdown, erythema, warmth or callus  Left foot:      Skin integrity: Dry skin present  No ulcer, skin breakdown, erythema, warmth or callus  Skin:     General: Skin is warm and dry  Neurological:      Mental Status: He is alert and oriented to person, place, and time  Psychiatric:         Mood and Affect: Mood normal          Speech: Speech normal          Behavior: Behavior normal          Thought Content: Thought content normal          Judgment: Judgment normal            Patient's shoes and socks removed  Right Foot/Ankle   Right Foot Inspection  Skin Exam: skin normal, skin intact and dry skin  No warmth, no callus, no erythema, no maceration, no abnormal color, no pre-ulcer, no ulcer and no callus  Toe Exam: ROM and strength within normal limits  Sensory   Monofilament testing: intact    Vascular  Capillary refills: < 3 seconds  The right DP pulse is 2+  Left Foot/Ankle  Left Foot Inspection  Skin Exam: skin normal, skin intact and dry skin  No warmth, no erythema, no maceration, normal color, no pre-ulcer, no ulcer and no callus  Toe Exam: ROM and strength within normal limits  Sensory   Monofilament testing: intact    Vascular  Capillary refills: < 3 seconds  The left DP pulse is 2+       Assign Risk Category  No deformity present  No loss of protective sensation  No weak pulses  Risk: 0

## 2022-04-11 NOTE — PATIENT INSTRUCTIONS
Discussed counting calories for weight loss  Get labs before next visit  Continue same medications  Call for problems/concerns

## 2022-07-11 ENCOUNTER — TELEPHONE (OUTPATIENT)
Dept: FAMILY MEDICINE CLINIC | Facility: CLINIC | Age: 72
End: 2022-07-11

## 2022-07-11 DIAGNOSIS — R52 BODY ACHES: ICD-10-CM

## 2022-07-11 DIAGNOSIS — J02.9 SORE THROAT: Primary | ICD-10-CM

## 2022-07-11 PROCEDURE — U0003 INFECTIOUS AGENT DETECTION BY NUCLEIC ACID (DNA OR RNA); SEVERE ACUTE RESPIRATORY SYNDROME CORONAVIRUS 2 (SARS-COV-2) (CORONAVIRUS DISEASE [COVID-19]), AMPLIFIED PROBE TECHNIQUE, MAKING USE OF HIGH THROUGHPUT TECHNOLOGIES AS DESCRIBED BY CMS-2020-01-R: HCPCS | Performed by: INTERNAL MEDICINE

## 2022-07-11 PROCEDURE — U0005 INFEC AGEN DETEC AMPLI PROBE: HCPCS | Performed by: INTERNAL MEDICINE

## 2022-07-11 NOTE — TELEPHONE ENCOUNTER
Called and spoke with patient  States he started yesterday with sore throat and body aches  Denies congestion, cough, N/V/D, loss of taste/smell, headache  Pt wants covid testing  Ordered   Aware of instructions upon arrival

## 2022-07-11 NOTE — TELEPHONE ENCOUNTER
Pt called and stated that he started with symptoms yesterday   Sore throat  Congestion  Mucus cough    He has friends that are sick and will not get tested  He is asking to be tested for covid    Please advise    Phone: [phone]

## 2022-07-12 DIAGNOSIS — U07.1 COVID-19: Primary | ICD-10-CM

## 2022-07-12 LAB — SARS-COV-2 RNA RESP QL NAA+PROBE: POSITIVE

## 2022-09-09 DIAGNOSIS — E11.9 TYPE 2 DIABETES MELLITUS WITHOUT COMPLICATION, WITHOUT LONG-TERM CURRENT USE OF INSULIN (HCC): ICD-10-CM

## 2022-09-09 NOTE — TELEPHONE ENCOUNTER
Patient requesting refill(s) of: metformin     Last filled: 1/11/22  Last appt: 4/11/22  Next appt: 10/24/22  Pharmacy: Metformin

## 2022-10-18 ENCOUNTER — APPOINTMENT (OUTPATIENT)
Dept: LAB | Facility: CLINIC | Age: 72
End: 2022-10-18
Payer: MEDICARE

## 2022-10-18 DIAGNOSIS — E78.2 MIXED HYPERLIPIDEMIA: ICD-10-CM

## 2022-10-18 DIAGNOSIS — I10 ESSENTIAL HYPERTENSION: ICD-10-CM

## 2022-10-18 DIAGNOSIS — E11.9 TYPE 2 DIABETES MELLITUS WITHOUT COMPLICATION, WITHOUT LONG-TERM CURRENT USE OF INSULIN (HCC): ICD-10-CM

## 2022-10-18 LAB
ALBUMIN SERPL BCP-MCNC: 3.6 G/DL (ref 3.5–5)
ALP SERPL-CCNC: 77 U/L (ref 46–116)
ALT SERPL W P-5'-P-CCNC: 42 U/L (ref 12–78)
ANION GAP SERPL CALCULATED.3IONS-SCNC: 6 MMOL/L (ref 4–13)
AST SERPL W P-5'-P-CCNC: 29 U/L (ref 5–45)
BASOPHILS # BLD AUTO: 0.05 THOUSANDS/ΜL (ref 0–0.1)
BASOPHILS NFR BLD AUTO: 1 % (ref 0–1)
BILIRUB SERPL-MCNC: 0.56 MG/DL (ref 0.2–1)
BUN SERPL-MCNC: 16 MG/DL (ref 5–25)
CALCIUM SERPL-MCNC: 9.5 MG/DL (ref 8.3–10.1)
CHLORIDE SERPL-SCNC: 106 MMOL/L (ref 96–108)
CHOLEST SERPL-MCNC: 110 MG/DL
CO2 SERPL-SCNC: 27 MMOL/L (ref 21–32)
CREAT SERPL-MCNC: 0.92 MG/DL (ref 0.6–1.3)
CREAT UR-MCNC: 67.1 MG/DL
EOSINOPHIL # BLD AUTO: 0.41 THOUSAND/ΜL (ref 0–0.61)
EOSINOPHIL NFR BLD AUTO: 5 % (ref 0–6)
ERYTHROCYTE [DISTWIDTH] IN BLOOD BY AUTOMATED COUNT: 12.8 % (ref 11.6–15.1)
EST. AVERAGE GLUCOSE BLD GHB EST-MCNC: 134 MG/DL
GFR SERPL CREATININE-BSD FRML MDRD: 82 ML/MIN/1.73SQ M
GLUCOSE P FAST SERPL-MCNC: 126 MG/DL (ref 65–99)
HBA1C MFR BLD: 6.3 %
HCT VFR BLD AUTO: 44.2 % (ref 36.5–49.3)
HDLC SERPL-MCNC: 40 MG/DL
HGB BLD-MCNC: 14 G/DL (ref 12–17)
IMM GRANULOCYTES # BLD AUTO: 0.02 THOUSAND/UL (ref 0–0.2)
IMM GRANULOCYTES NFR BLD AUTO: 0 % (ref 0–2)
LDLC SERPL CALC-MCNC: 46 MG/DL (ref 0–100)
LYMPHOCYTES # BLD AUTO: 1.75 THOUSANDS/ΜL (ref 0.6–4.47)
LYMPHOCYTES NFR BLD AUTO: 22 % (ref 14–44)
MCH RBC QN AUTO: 29.7 PG (ref 26.8–34.3)
MCHC RBC AUTO-ENTMCNC: 31.7 G/DL (ref 31.4–37.4)
MCV RBC AUTO: 94 FL (ref 82–98)
MICROALBUMIN UR-MCNC: 1560 MG/L (ref 0–20)
MICROALBUMIN/CREAT 24H UR: 2325 MG/G CREATININE (ref 0–30)
MONOCYTES # BLD AUTO: 0.79 THOUSAND/ΜL (ref 0.17–1.22)
MONOCYTES NFR BLD AUTO: 10 % (ref 4–12)
NEUTROPHILS # BLD AUTO: 5.03 THOUSANDS/ΜL (ref 1.85–7.62)
NEUTS SEG NFR BLD AUTO: 62 % (ref 43–75)
NONHDLC SERPL-MCNC: 70 MG/DL
NRBC BLD AUTO-RTO: 0 /100 WBCS
PLATELET # BLD AUTO: 235 THOUSANDS/UL (ref 149–390)
PMV BLD AUTO: 10.5 FL (ref 8.9–12.7)
POTASSIUM SERPL-SCNC: 4 MMOL/L (ref 3.5–5.3)
PROT SERPL-MCNC: 7.6 G/DL (ref 6.4–8.4)
RBC # BLD AUTO: 4.72 MILLION/UL (ref 3.88–5.62)
SODIUM SERPL-SCNC: 139 MMOL/L (ref 135–147)
TRIGL SERPL-MCNC: 122 MG/DL
WBC # BLD AUTO: 8.05 THOUSAND/UL (ref 4.31–10.16)

## 2022-10-18 PROCEDURE — 83036 HEMOGLOBIN GLYCOSYLATED A1C: CPT

## 2022-10-18 PROCEDURE — 36415 COLL VENOUS BLD VENIPUNCTURE: CPT

## 2022-10-18 PROCEDURE — 85025 COMPLETE CBC W/AUTO DIFF WBC: CPT

## 2022-10-18 PROCEDURE — 80061 LIPID PANEL: CPT

## 2022-10-18 PROCEDURE — 80053 COMPREHEN METABOLIC PANEL: CPT

## 2022-10-24 ENCOUNTER — OFFICE VISIT (OUTPATIENT)
Dept: FAMILY MEDICINE CLINIC | Facility: CLINIC | Age: 72
End: 2022-10-24
Payer: MEDICARE

## 2022-10-24 VITALS
BODY MASS INDEX: 45.32 KG/M2 | HEIGHT: 66 IN | HEART RATE: 73 BPM | WEIGHT: 282 LBS | RESPIRATION RATE: 20 BRPM | OXYGEN SATURATION: 98 % | SYSTOLIC BLOOD PRESSURE: 122 MMHG | TEMPERATURE: 98 F | DIASTOLIC BLOOD PRESSURE: 68 MMHG

## 2022-10-24 DIAGNOSIS — E26.9 HYPERALDOSTERONISM (HCC): ICD-10-CM

## 2022-10-24 DIAGNOSIS — E11.9 TYPE 2 DIABETES MELLITUS WITHOUT COMPLICATION, WITHOUT LONG-TERM CURRENT USE OF INSULIN (HCC): ICD-10-CM

## 2022-10-24 DIAGNOSIS — Z12.5 SCREENING PSA (PROSTATE SPECIFIC ANTIGEN): ICD-10-CM

## 2022-10-24 DIAGNOSIS — Z00.00 MEDICARE ANNUAL WELLNESS VISIT, SUBSEQUENT: Primary | ICD-10-CM

## 2022-10-24 DIAGNOSIS — E11.29 TYPE 2 DIABETES MELLITUS WITH MICROALBUMINURIA, WITHOUT LONG-TERM CURRENT USE OF INSULIN (HCC): ICD-10-CM

## 2022-10-24 DIAGNOSIS — R80.9 MICROALBUMINURIA: ICD-10-CM

## 2022-10-24 DIAGNOSIS — E78.2 MIXED HYPERLIPIDEMIA: ICD-10-CM

## 2022-10-24 DIAGNOSIS — R80.9 TYPE 2 DIABETES MELLITUS WITH MICROALBUMINURIA, WITHOUT LONG-TERM CURRENT USE OF INSULIN (HCC): ICD-10-CM

## 2022-10-24 DIAGNOSIS — Z23 ENCOUNTER FOR IMMUNIZATION: ICD-10-CM

## 2022-10-24 DIAGNOSIS — I10 ESSENTIAL HYPERTENSION: ICD-10-CM

## 2022-10-24 LAB
CREAT UR-MCNC: 152 MG/DL
MICROALBUMIN UR-MCNC: 285 MG/L (ref 0–20)
MICROALBUMIN/CREAT 24H UR: 188 MG/G CREATININE (ref 0–30)

## 2022-10-24 PROCEDURE — G0008 ADMIN INFLUENZA VIRUS VAC: HCPCS

## 2022-10-24 PROCEDURE — 90662 IIV NO PRSV INCREASED AG IM: CPT

## 2022-10-24 PROCEDURE — 82043 UR ALBUMIN QUANTITATIVE: CPT | Performed by: NURSE PRACTITIONER

## 2022-10-24 PROCEDURE — 82570 ASSAY OF URINE CREATININE: CPT | Performed by: NURSE PRACTITIONER

## 2022-10-24 PROCEDURE — G0439 PPPS, SUBSEQ VISIT: HCPCS | Performed by: NURSE PRACTITIONER

## 2022-10-24 PROCEDURE — 99214 OFFICE O/P EST MOD 30 MIN: CPT | Performed by: NURSE PRACTITIONER

## 2022-10-24 RX ORDER — ROSUVASTATIN CALCIUM 5 MG/1
5 TABLET, COATED ORAL DAILY
Qty: 90 TABLET | Refills: 3 | Status: SHIPPED | OUTPATIENT
Start: 2022-10-24

## 2022-10-24 RX ORDER — HYDROCHLOROTHIAZIDE 12.5 MG/1
12.5 TABLET ORAL DAILY
Qty: 90 TABLET | Refills: 3 | Status: SHIPPED | OUTPATIENT
Start: 2022-10-24

## 2022-10-24 RX ORDER — AMLODIPINE BESYLATE 10 MG/1
10 TABLET ORAL DAILY
Qty: 90 TABLET | Refills: 3 | Status: SHIPPED | OUTPATIENT
Start: 2022-10-24

## 2022-10-24 RX ORDER — EZETIMIBE 10 MG/1
10 TABLET ORAL DAILY
Qty: 90 TABLET | Refills: 3 | Status: SHIPPED | OUTPATIENT
Start: 2022-10-24

## 2022-10-24 RX ORDER — POTASSIUM CHLORIDE 20 MEQ/1
20 TABLET, EXTENDED RELEASE ORAL DAILY
Qty: 90 TABLET | Refills: 3 | Status: SHIPPED | OUTPATIENT
Start: 2022-10-24

## 2022-10-24 RX ORDER — VALSARTAN 160 MG/1
160 TABLET ORAL DAILY
Qty: 90 TABLET | Refills: 3 | Status: SHIPPED | OUTPATIENT
Start: 2022-10-24

## 2022-10-24 RX ORDER — PIOGLITAZONEHYDROCHLORIDE 15 MG/1
15 TABLET ORAL DAILY
Qty: 90 TABLET | Refills: 3 | Status: SHIPPED | OUTPATIENT
Start: 2022-10-24

## 2022-10-24 NOTE — PATIENT INSTRUCTIONS
We will recheck microalbumin/creatinine today  I will call with the results  If elevated, please call your Nephrologist and make an appointment  Please complete blood work before next visit  Follow up in 6 months or sooner as needed  Medicare Preventive Visit Patient Instructions  Thank you for completing your Welcome to Medicare Visit or Medicare Annual Wellness Visit today  Your next wellness visit will be due in one year (10/25/2023)  The screening/preventive services that you may require over the next 5-10 years are detailed below  Some tests may not apply to you based off risk factors and/or age  Screening tests ordered at today's visit but not completed yet may show as past due  Also, please note that scanned in results may not display below  Preventive Screenings:  Service Recommendations Previous Testing/Comments   Colorectal Cancer Screening  Colonoscopy    Fecal Occult Blood Test (FOBT)/Fecal Immunochemical Test (FIT)  Fecal DNA/Cologuard Test  Flexible Sigmoidoscopy Age: 39-70 years old   Colonoscopy: every 10 years (May be performed more frequently if at higher risk)  OR  FOBT/FIT: every 1 year  OR  Cologuard: every 3 years  OR  Sigmoidoscopy: every 5 years  Screening may be recommended earlier than age 39 if at higher risk for colorectal cancer  Also, an individualized decision between you and your healthcare provider will decide whether screening between the ages of 74-80 would be appropriate   Colonoscopy: 05/14/2019  FOBT/FIT: Not on file  Cologuard: Not on file  Sigmoidoscopy: Not on file    Screening Current     Prostate Cancer Screening Individualized decision between patient and health care provider in men between ages of 53-78   Medicare will cover every 12 months beginning on the day after your 50th birthday PSA: 0 5 ng/mL     Screening Current     Hepatitis C Screening Once for adults born between [facility]  More frequently in patients at high risk for Hepatitis C Hep C Antibody: 08/24/2019    Screening Current   Diabetes Screening 1-2 times per year if you're at risk for diabetes or have pre-diabetes Fasting glucose: 126 mg/dL (10/18/2022)  A1C: 6 3 % (10/18/2022)  Screening Not Indicated  History Diabetes   Cholesterol Screening Once every 5 years if you don't have a lipid disorder  May order more often based on risk factors  Lipid panel: 10/18/2022  Screening Not Indicated  History Lipid Disorder      Other Preventive Screenings Covered by Medicare:  Abdominal Aortic Aneurysm (AAA) Screening: covered once if your at risk  You're considered to be at risk if you have a family history of AAA or a male between the age of 73-68 who smoking at least 100 cigarettes in your lifetime  Lung Cancer Screening: covers low dose CT scan once per year if you meet all of the following conditions: (1) Age 50-69; (2) No signs or symptoms of lung cancer; (3) Current smoker or have quit smoking within the last 15 years; (4) You have a tobacco smoking history of at least 20 pack years (packs per day x number of years you smoked); (5) You get a written order from a healthcare provider  Glaucoma Screening: covered annually if you're considered high risk: (1) You have diabetes OR (2) Family history of glaucoma OR (3)  aged 48 and older OR (3)  American aged 72 and older  Osteoporosis Screening: covered every 2 years if you meet one of the following conditions: (1) Have a vertebral abnormality; (2) On glucocorticoid therapy for more than 3 months; (3) Have primary hyperparathyroidism; (4) On osteoporosis medications and need to assess response to drug therapy  HIV Screening: covered annually if you're between the age of 12-76  Also covered annually if you are younger than 13 and older than 72 with risk factors for HIV infection  For pregnant patients, it is covered up to 3 times per pregnancy      Immunizations:  Immunization Recommendations   Influenza Vaccine Annual influenza vaccination during flu season is recommended for all persons aged >= 6 months who do not have contraindications   Pneumococcal Vaccine   * Pneumococcal conjugate vaccine = PCV13 (Prevnar 13), PCV15 (Vaxneuvance), PCV20 (Prevnar 20)  * Pneumococcal polysaccharide vaccine = PPSV23 (Pneumovax) Adults 25-60 years old: 1-3 doses may be recommended based on certain risk factors  Adults 72 years old: 1-2 doses may be recommended based off what pneumonia vaccine you previously received   Hepatitis B Vaccine 3 dose series if at intermediate or high risk (ex: diabetes, end stage renal disease, liver disease)   Tetanus (Td) Vaccine - COST NOT COVERED BY MEDICARE PART B Following completion of primary series, a booster dose should be given every 10 years to maintain immunity against tetanus  Td may also be given as tetanus wound prophylaxis  Tdap Vaccine - COST NOT COVERED BY MEDICARE PART B Recommended at least once for all adults  For pregnant patients, recommended with each pregnancy  Shingles Vaccine (Shingrix) - COST NOT COVERED BY MEDICARE PART B  2 shot series recommended in those aged 48 and above     Health Maintenance Due:      Topic Date Due    Colorectal Cancer Screening  05/14/2024    Hepatitis C Screening  Completed     Immunizations Due:      Topic Date Due    COVID-19 Vaccine (4 - Booster for Moderna series) 03/02/2022     Advance Directives   What are advance directives? Advance directives are legal documents that state your wishes and plans for medical care  These plans are made ahead of time in case you lose your ability to make decisions for yourself  Advance directives can apply to any medical decision, such as the treatments you want, and if you want to donate organs  What are the types of advance directives? There are many types of advance directives, and each state has rules about how to use them  You may choose a combination of any of the following:  Living will:   This is a written record of the treatment you want  You can also choose which treatments you do not want, which to limit, and which to stop at a certain time  This includes surgery, medicine, IV fluid, and tube feedings  Durable power of  for healthcare [facility]): This is a written record that states who you want to make healthcare choices for you when you are unable to make them for yourself  This person, called a proxy, is usually a family member or a friend  You may choose more than 1 proxy  Do not resuscitate (DNR) order:  A DNR order is used in case your heart stops beating or you stop breathing  It is a request not to have certain forms of treatment, such as CPR  A DNR order may be included in other types of advance directives  Medical directive: This covers the care that you want if you are in a coma, near death, or unable to make decisions for yourself  You can list the treatments you want for each condition  Treatment may include pain medicine, surgery, blood transfusions, dialysis, IV or tube feedings, and a ventilator (breathing machine)  Values history: This document has questions about your views, beliefs, and how you feel and think about life  This information can help others choose the care that you would choose  Why are advance directives important? An advance directive helps you control your care  Although spoken wishes may be used, it is better to have your wishes written down  Spoken wishes can be misunderstood, or not followed  Treatments may be given even if you do not want them  An advance directive may make it easier for your family to make difficult choices about your care  Weight Management   Why it is important to manage your weight:  Being overweight increases your risk of health conditions such as heart disease, high blood pressure, type 2 diabetes, and certain types of cancer  It can also increase your risk for osteoarthritis, sleep apnea, and other respiratory problems   Aim for a slow, steady weight loss  Even a small amount of weight loss can lower your risk of health problems  How to lose weight safely:  A safe and healthy way to lose weight is to eat fewer calories and get regular exercise  You can lose up about 1 pound a week by decreasing the number of calories you eat by 500 calories each day  Healthy meal plan for weight management:  A healthy meal plan includes a variety of foods, contains fewer calories, and helps you stay healthy  A healthy meal plan includes the following:  Eat whole-grain foods more often  A healthy meal plan should contain fiber  Fiber is the part of grains, fruits, and vegetables that is not broken down by your body  Whole-grain foods are healthy and provide extra fiber in your diet  Some examples of whole-grain foods are whole-wheat breads and pastas, oatmeal, brown rice, and bulgur  Eat a variety of vegetables every day  Include dark, leafy greens such as spinach, kale, jesus greens, and mustard greens  Eat yellow and orange vegetables such as carrots, sweet potatoes, and winter squash  Eat a variety of fruits every day  Choose fresh or canned fruit (canned in its own juice or light syrup) instead of juice  Fruit juice has very little or no fiber  Eat low-fat dairy foods  Drink fat-free (skim) milk or 1% milk  Eat fat-free yogurt and low-fat cottage cheese  Try low-fat cheeses such as mozzarella and other reduced-fat cheeses  Choose meat and other protein foods that are low in fat  Choose beans or other legumes such as split peas or lentils  Choose fish, skinless poultry (chicken or turkey), or lean cuts of red meat (beef or pork)  Before you cook meat or poultry, cut off any visible fat  Use less fat and oil  Try baking foods instead of frying them  Add less fat, such as margarine, sour cream, regular salad dressing and mayonnaise to foods  Eat fewer high-fat foods   Some examples of high-fat foods include french fries, doughnuts, ice cream, and cakes  Eat fewer sweets  Limit foods and drinks that are high in sugar  This includes candy, cookies, regular soda, and sweetened drinks  Exercise:  Exercise at least 30 minutes per day on most days of the week  Some examples of exercise include walking, biking, dancing, and swimming  You can also fit in more physical activity by taking the stairs instead of the elevator or parking farther away from stores  Ask your healthcare provider about the best exercise plan for you  © Copyright [employer] 2018 Information is for End User's use only and may not be sold, redistributed or otherwise used for commercial purposes   All illustrations and images included in CareNotes® are the copyrighted property of A [employer]  or [address] [place] [address]

## 2022-10-24 NOTE — PROGRESS NOTES
Assessment and Plan:     Problem List Items Addressed This Visit        Endocrine    Hyperaldosteronism ([facility] )    Type 2 diabetes mellitus without complication, without long-term current use of insulin (HCC)    Relevant Medications    metFORMIN (GLUCOPHAGE) 500 mg tablet    pioglitazone (ACTOS) 15 mg tablet    Other Relevant Orders    CBC and differential    Comprehensive metabolic panel    HEMOGLOBIN A1C W/ EAG ESTIMATION    Microalbumin / creatinine urine ratio    POCT urine microalbumin/creatinine       Cardiovascular and Mediastinum    Essential hypertension    Relevant Medications    amLODIPine (NORVASC) 10 mg tablet    hydrochlorothiazide (HYDRODIURIL) 12 5 mg tablet    potassium chloride (K-DUR,KLOR-CON) 20 mEq tablet    valsartan (DIOVAN) 160 mg tablet    Other Relevant Orders    Comprehensive metabolic panel       Other    Mixed hyperlipidemia    Relevant Medications    ezetimibe (ZETIA) 10 mg tablet    rosuvastatin (CRESTOR) 5 mg tablet    Other Relevant Orders    Lipid panel      Other Visit Diagnoses     Medicare annual wellness visit, subsequent    -  Primary    Encounter for immunization        Relevant Orders    influenza vaccine, high-dose, PF 0 7 mL (FLUZONE HIGH-DOSE) (Completed)    Microalbuminuria        Relevant Orders    Microalbumin / creatinine urine ratio    Microalbumin / creatinine urine ratio    Type 2 diabetes mellitus with microalbuminuria, without long-term current use of insulin (HCC)        Relevant Medications    metFORMIN (GLUCOPHAGE) 500 mg tablet    pioglitazone (ACTOS) 15 mg tablet    Other Relevant Orders    POCT urine microalbumin/creatinine    Screening PSA (prostate specific antigen)        Relevant Orders    PSA, Total Screen        BMI Counseling: Body mass index is 45 52 kg/m²   The BMI is above normal  Nutrition recommendations include decreasing portion sizes, encouraging healthy choices of fruits and vegetables, decreasing fast food intake, consuming healthier snacks, limiting drinks that contain sugar, moderation in carbohydrate intake and increasing intake of lean protein  Rationale for BMI follow-up plan is due to patient being overweight or obese  Depression Screening and Follow-up Plan: Patient was screened for depression during today's encounter  They screened negative with a PHQ-2 score of 0  Preventive health issues were discussed with patient, and age appropriate screening tests were ordered as noted in patient's After Visit Summary  Personalized health advice and appropriate referrals for health education or preventive services given if needed, as noted in patient's After Visit Summary  History of Present Illness:     Patient presents for a Medicare Wellness Visit    Here today for [address]  No new complaints or concerns  Reviewed blood work, all questions answered  Microalbuminuria noted today with significant increase from last result  Will recheck today to verify, I will call with patient with his results  If truly elevated, pt should call his Nephrologist for an appointment  Pt reports he has not recently been dehydrated or ill, he has been drinking a lot of soda and lemonade  Discussed cutting back on these and drinking more water  Diabetes mellitus type 2- compliant with medication, A1C decreased from 6 5 to 6 3, continue current medication as prescribed  Hyperlipidemia- stable on Zetia and Crestor, refills sent, will check lipid panel before next visit  Hypertension- controlled on current medication regiment, refills sent  Flu shot today  Patient Care Team:  Arjun Osuna as PCP - General (Family Medicine)     Review of Systems:     Review of Systems   Constitutional: Negative for activity change, diaphoresis, fatigue and fever  HENT: Negative for congestion, facial swelling, hearing loss, rhinorrhea, sinus pressure, sinus pain, sneezing, sore throat and voice change  Eyes: Negative for discharge and visual disturbance  Respiratory: Negative for cough, choking, chest tightness, shortness of breath, wheezing and stridor  Cardiovascular: Negative for chest pain, palpitations and leg swelling  Gastrointestinal: Negative for abdominal distention, abdominal pain, constipation, diarrhea, nausea and vomiting  Endocrine: Negative for polydipsia, polyphagia and polyuria  Genitourinary: Negative for difficulty urinating, dysuria, frequency and urgency  Musculoskeletal: Negative for arthralgias, back pain, gait problem, joint swelling, myalgias, neck pain and neck stiffness  Skin: Negative for color change, rash and wound  Neurological: Negative for dizziness, syncope, speech difficulty, weakness, light-headedness and headaches  Hematological: Negative for adenopathy  Does not bruise/bleed easily  Psychiatric/Behavioral: Negative for agitation, behavioral problems, confusion, hallucinations, sleep disturbance and suicidal ideas  The patient is not nervous/anxious           Problem List:     Patient Active Problem List   Diagnosis   • Encounter for diabetic foot exam ([facility] )   • Morbid obesity with BMI of 40 0-44 9, adult ([facility] )   • Essential hypertension   • Lung nodule seen on imaging study   • Mixed hyperlipidemia   • Renal mass, right   • Edema   • Hyperaldosteronism ([facility] [address] )   • Type 2 diabetes mellitus without complication, without long-term current use of insulin ([facility] [address] )   • Gross hematuria   • Renal cell carcinoma ([facility] [address] )   • Benign prostatic hyperplasia with nocturia   • Heart murmur      Past Medical and Surgical History:     Past Medical History:   Diagnosis Date   • Carotid artery occlusion    • Disease of thyroid gland    • Hypertension    • Mixed hyperlipidemia    • Peripheral vascular disease ([facility] [address] )    • Renal mass      Past Surgical History:   Procedure Laterality Date   • APPENDECTOMY     • CARPAL TUNNEL RELEASE     • CHOLECYSTECTOMY     • COLONOSCOPY  2014 12 polyps    • IR CRYOABLATION  10/20/2021   • JOINT REPLACEMENT Left     TKR   • JOINT REPLACEMENT Right     Total knee replacement    • SHOULDER SURGERY Left     repair       Family History:     Family History   Problem Relation Age of Onset   • Breast cancer Mother    • Heart disease Father    • Stroke Father    • Breast cancer Sister    • Sudden death Brother         MVA   • Obesity Brother    • Diabetes Brother    • Obesity Brother       Social History:     Social History     Socioeconomic History   • Marital status: /Civil Union     Spouse name: None   • Number of children: None   • Years of education: None   • Highest education level: None   Occupational History   • Occupation: [employer]  in steady days    Tobacco Use   • Smoking status: Never Smoker   • Smokeless tobacco: Never Used   • Tobacco comment: Former smoker - As per [facility] Use   • Vaping Use: Never used   Substance and Sexual Activity   • Alcohol use: Yes     Comment: occasionally   • Drug use: Never   • Sexual activity: None   Other Topics Concern   • None   Social History Narrative    Consumes on average 3 cups of regular coffee per day      Social Determinants of Health     Financial Resource Strain: Low Risk    • Difficulty of Paying Living Expenses: Not very hard   Food Insecurity: Not on file   Transportation Needs: No Transportation Needs   • Lack of Transportation (Medical): No   • Lack of Transportation (Non-Medical):  No   Physical Activity: Not on file   Stress: Not on file   Social Connections: Not on file   Intimate Partner Violence: Not on file   Housing Stability: Not on file      Medications and Allergies:     Current Outpatient Medications   Medication Sig Dispense Refill   • amLODIPine (NORVASC) 10 mg tablet Take 1 tablet (10 mg total) by mouth daily 90 tablet 3   • NELLY ASPIRIN EC LOW DOSE PO Take 81 mg by mouth daily     • ezetimibe (ZETIA) 10 mg tablet Take 1 tablet (10 mg total) by mouth daily 90 tablet 3   • glucose blood test strip Use to test sugars four times daily CONTOUR TEST STRIP 100 each 5   • hydrochlorothiazide (HYDRODIURIL) 12 5 mg tablet Take 1 tablet (12 5 mg total) by mouth daily 90 tablet 3   • metFORMIN (GLUCOPHAGE) 500 mg tablet 2 In the  Morning, 1 At Night 270 tablet 3   • Multiple Vitamins-Minerals (CENTRUM SILVER 50+MEN PO) Centrum Silver     • OneTouch Delica Lancets 34R MISC Use to test sugars three times daily  E11 9 100 each 5   • pioglitazone (ACTOS) 15 mg tablet Take 1 tablet (15 mg total) by mouth daily 90 tablet 3   • potassium chloride (K-DUR,KLOR-CON) 20 mEq tablet Take 1 tablet (20 mEq total) by mouth daily 90 tablet 3   • Probiotic Product (PROBIOTIC ADVANCED PO) Take by mouth     • rosuvastatin (CRESTOR) 5 mg tablet Take 1 tablet (5 mg total) by mouth daily 90 tablet 3   • tamsulosin (FLOMAX) 0 4 mg Take 1 capsule (0 4 mg total) by mouth daily with dinner 90 capsule 3   • valsartan (DIOVAN) 160 mg tablet Take 1 tablet (160 mg total) by mouth daily 90 tablet 3     No current facility-administered medications for this visit  No Known Allergies   Immunizations:     Immunization History   Administered Date(s) Administered   • COVID-19 MODERNA VACC 0 5 ML IM 03/16/2021, 04/11/2021, 11/02/2021   • INFLUENZA 10/20/2016, 10/11/2017, 09/19/2018   • Influenza, high dose seasonal 0 7 mL 09/24/2019, 09/25/2020, 10/18/2021, 10/24/2022   • Pneumococcal Conjugate 13-Valent 10/20/2016   • Pneumococcal Polysaccharide PPV23 03/12/2018   • Tdap 10/26/2014, 09/06/2019   • Zoster 11/10/2014   • Zoster Vaccine Recombinant 01/01/2020      Health Maintenance:         Topic Date Due   • Colorectal Cancer Screening  05/14/2024   • Hepatitis C Screening  Completed         Topic Date Due   • COVID-19 Vaccine (4 - Booster for Guillermo Duck series) 03/02/2022      Medicare Screening Tests and Risk Assessments:     Kyle Hodge is here for his Subsequent Wellness visit  Health Risk Assessment:   Patient rates overall health as good   Patient feels that their physical health rating is same  Patient is satisfied with their life  Eyesight was rated as same  Hearing was rated as same  Patient feels that their emotional and mental health rating is same  Patients states they are never, rarely angry  Patient states they are never, rarely unusually tired/fatigued  Pain experienced in the last 7 days has been none  Patient states that he has experienced no weight loss or gain in last 6 months  Fall Risk Screening: In the past year, patient has experienced: no history of falling in past year      Home Safety:  Patient does not have trouble with stairs inside or outside of their home  Patient has working smoke alarms and has working carbon monoxide detector  Home safety hazards include: none  Nutrition:   Current diet is Regular  Medications:   Patient is currently taking over-the-counter supplements  OTC medications include: see medication list  Patient is able to manage medications  Activities of Daily Living (ADLs)/Instrumental Activities of Daily Living (IADLs):   Walk and transfer into and out of bed and chair?: Yes  Dress and groom yourself?: Yes    Bathe or shower yourself?: Yes    Feed yourself?  Yes  Do your laundry/housekeeping?: Yes  Manage your money, pay your bills and track your expenses?: Yes  Make your own meals?: Yes    Do your own shopping?: Yes    Previous Hospitalizations:   Any hospitalizations or ED visits within the last 12 months?: No      Advance Care Planning:   Living will: No    Durable POA for healthcare: No    Advanced directive counseling given: Yes      PREVENTIVE SCREENINGS      Cardiovascular Screening:    General: Screening Not Indicated and History Lipid Disorder    Due for: Lipid Panel      Diabetes Screening:     General: Screening Not Indicated and History Diabetes    Due for: Blood Glucose      Colorectal Cancer Screening:     General: Screening Current      Prostate Cancer Screening:    General: Screening Current    Due for: PSA Abdominal Aortic Aneurysm (AAA) Screening:    Risk factors include: age between 73-69 yo        Lung Cancer Screening:     General: Screening Not Indicated      Hepatitis C Screening:    General: Screening Current    Screening, Brief Intervention, and Referral to Treatment (SBIRT)    Screening  Typical number of drinks in a day: 0  Typical number of drinks in a week: 0  Interpretation: Low risk drinking behavior  Single Item Drug Screening:  How often have you used an illegal drug (including marijuana) or a prescription medication for non-medical reasons in the past year? never    Single Item Drug Screen Score: 0  Interpretation: Negative screen for possible drug use disorder    No exam data present     Physical Exam:     /68   Pulse 73   Temp 98 °F (36 7 °C) (Tympanic)   Resp 20   Ht 5' 6" (1 676 m)   Wt 128 kg (282 lb)   SpO2 98%   BMI 45 52 kg/m²     Physical Exam  Vitals and nursing note reviewed  Exam conducted with a chaperone present (wife Kirk Rodrigues)  Constitutional:       General: He is not in acute distress  Appearance: Normal appearance  He is well-developed  He is obese  He is not ill-appearing, toxic-appearing or diaphoretic  Neck:      Vascular: No carotid bruit  Cardiovascular:      Rate and Rhythm: Normal rate and regular rhythm  Pulses:           Dorsalis pedis pulses are 2+ on the right side and 2+ on the left side  Heart sounds: Normal heart sounds  Pulmonary:      Effort: Pulmonary effort is normal  No respiratory distress  Breath sounds: Normal breath sounds  Musculoskeletal:         General: Normal range of motion  Cervical back: Normal range of motion and neck supple  Right lower leg: No edema  Left lower leg: No edema  Feet:      Right foot:      Skin integrity: No ulcer, skin breakdown, erythema, warmth, callus or dry skin  Left foot:      Skin integrity: No ulcer, skin breakdown, erythema, warmth, callus or dry skin     Skin: General: Skin is warm and dry  Coloration: Skin is not pale  Neurological:      General: No focal deficit present  Mental Status: He is alert and oriented to person, place, and time  Psychiatric:         Mood and Affect: Mood normal  Mood is not anxious  Speech: Speech normal          Behavior: Behavior normal  Behavior is cooperative  Thought Content:  Thought content normal          Judgment: Judgment normal           BRANDI Yepez

## 2022-10-28 LAB
LEFT EYE DIABETIC RETINOPATHY: POSITIVE
RIGHT EYE DIABETIC RETINOPATHY: POSITIVE

## 2022-12-07 ENCOUNTER — OFFICE VISIT (OUTPATIENT)
Dept: URGENT CARE | Facility: CLINIC | Age: 72
End: 2022-12-07

## 2022-12-07 VITALS
HEIGHT: 66 IN | OXYGEN SATURATION: 96 % | RESPIRATION RATE: 18 BRPM | SYSTOLIC BLOOD PRESSURE: 142 MMHG | WEIGHT: 280 LBS | DIASTOLIC BLOOD PRESSURE: 73 MMHG | TEMPERATURE: 98.3 F | HEART RATE: 71 BPM | BODY MASS INDEX: 45 KG/M2

## 2022-12-07 DIAGNOSIS — L02.91 ABSCESS: Primary | ICD-10-CM

## 2022-12-07 RX ORDER — CEPHALEXIN 500 MG/1
500 CAPSULE ORAL EVERY 12 HOURS SCHEDULED
Qty: 14 CAPSULE | Refills: 0 | Status: SHIPPED | OUTPATIENT
Start: 2022-12-07 | End: 2022-12-14

## 2022-12-07 NOTE — PROGRESS NOTES
[facility]        NAME: Odalys Lockhart is a 67 y o  male  : 1950    MRN: [id number]  DATE: 2022  TIME: 10:44 AM    Assessment and Plan   Abscess [L02 91]  1  Abscess  cephalexin (KEFLEX) 500 mg capsule            Patient Instructions     Take all antibiotics as prescribed  Warm compress to the area  Call PCP to schedule a follow-up appt in the next 2-3 days for reevaluation and to ensure resolution of symptoms  Go to the nearest ER for evaluation if any fevers, redness, warmth, discharge, streaking redness, redness that is circumferential, bleeding, pain, signs of infection, new or worsening symptoms, or other concerning symptoms  Chief Complaint     Chief Complaint   Patient presents with   • Wound Check     Patient presents with raised, dime size abscess/wound to right lower quadrant that started over a month ago  History of Present Illness       70-year-old male presents with an abscess to the right side of his lower abdomen x1 month  He denies any injury, bug bites or other inciting factors  States he was scratching it yesterday and he opened it up  States yesterday he had some drainage from the area, states it is now scabbed up but did notice some redness around it and thinks it got infected  He has not tried anything for it  He denies any other rashes or lesions  Denies any fevers, chills, abdominal pain, nausea, vomiting, chest pain, shortness a breath or other complaints  Review of Systems   Review of Systems   Constitutional: Negative for activity change, appetite change, chills, fatigue and fever  HENT: Negative for facial swelling, sore throat, trouble swallowing and voice change  Eyes: Negative for itching and visual disturbance  Respiratory: Negative for shortness of breath  Cardiovascular: Negative for chest pain  Gastrointestinal: Negative for abdominal pain, diarrhea, nausea and vomiting     Musculoskeletal: Negative for arthralgias and joint swelling  Skin: Positive for rash and wound  Neurological: Negative for dizziness, seizures, weakness, numbness and headaches  All other systems reviewed and are negative  Current Medications       Current Outpatient Medications:   •  amLODIPine (NORVASC) 10 mg tablet, Take 1 tablet (10 mg total) by mouth daily, Disp: 90 tablet, Rfl: 3  •  NELLY ASPIRIN EC LOW DOSE PO, Take 81 mg by mouth daily, Disp: , Rfl:   •  cephalexin (KEFLEX) 500 mg capsule, Take 1 capsule (500 mg total) by mouth every 12 (twelve) hours for 7 days, Disp: 14 capsule, Rfl: 0  •  ezetimibe (ZETIA) 10 mg tablet, Take 1 tablet (10 mg total) by mouth daily, Disp: 90 tablet, Rfl: 3  •  glucose blood test strip, Use to test sugars four times daily CONTOUR TEST STRIP, Disp: 100 each, Rfl: 5  •  hydrochlorothiazide (HYDRODIURIL) 12 5 mg tablet, Take 1 tablet (12 5 mg total) by mouth daily, Disp: 90 tablet, Rfl: 3  •  metFORMIN (GLUCOPHAGE) 500 mg tablet, 2 In the  Morning, 1 At Night, Disp: 270 tablet, Rfl: 3  •  Multiple Vitamins-Minerals (CENTRUM SILVER 50+MEN PO), Centrum Silver, Disp: , Rfl:   •  OneTouch Delica Lancets 33I MISC, Use to test sugars three times daily   E11 9, Disp: 100 each, Rfl: 5  •  pioglitazone (ACTOS) 15 mg tablet, Take 1 tablet (15 mg total) by mouth daily, Disp: 90 tablet, Rfl: 3  •  potassium chloride (K-DUR,KLOR-CON) 20 mEq tablet, Take 1 tablet (20 mEq total) by mouth daily, Disp: 90 tablet, Rfl: 3  •  Probiotic Product (PROBIOTIC ADVANCED PO), Take by mouth, Disp: , Rfl:   •  rosuvastatin (CRESTOR) 5 mg tablet, Take 1 tablet (5 mg total) by mouth daily, Disp: 90 tablet, Rfl: 3  •  tamsulosin (FLOMAX) 0 4 mg, Take 1 capsule (0 4 mg total) by mouth daily with dinner, Disp: 90 capsule, Rfl: 3  •  valsartan (DIOVAN) 160 mg tablet, Take 1 tablet (160 mg total) by mouth daily, Disp: 90 tablet, Rfl: 3    Current Allergies     Allergies as of 12/07/2022   • (No Known Allergies)            The following portions of the patient's history were reviewed and updated as appropriate: allergies, current medications, past family history, past medical history, past social history, past surgical history and problem list      Past Medical History:   Diagnosis Date   • Carotid artery occlusion    • Disease of thyroid gland    • Hypertension    • Mixed hyperlipidemia    • Peripheral vascular disease ([address] )    • Renal mass        Past Surgical History:   Procedure Laterality Date   • APPENDECTOMY     • CARPAL TUNNEL RELEASE     • CHOLECYSTECTOMY     • COLONOSCOPY  2014 12 polyps    • IR CRYOABLATION  10/20/2021   • JOINT REPLACEMENT Left     TKR   • JOINT REPLACEMENT Right     Total knee replacement    • SHOULDER SURGERY Left     repair        Family History   Problem Relation Age of Onset   • Breast cancer Mother    • Heart disease Father    • Stroke Father    • Breast cancer Sister    • Sudden death Brother         MVA   • Obesity Brother    • Diabetes Brother    • Obesity Brother          Medications have been verified  Objective   /73   Pulse 71   Temp 98 3 °F (36 8 °C) (Temporal)   Resp 18   Ht 5' 6" (1 676 m)   Wt 127 kg (280 lb)   SpO2 96%   BMI 45 19 kg/m²        Physical Exam     Physical Exam  Vitals and nursing note reviewed  Constitutional:       General: He is not in acute distress  Appearance: Normal appearance  He is well-developed  He is not ill-appearing  HENT:      Head: Normocephalic and atraumatic  Mouth/Throat:      Mouth: Mucous membranes are moist    Cardiovascular:      Rate and Rhythm: Normal rate and regular rhythm  Heart sounds: Normal heart sounds  Pulmonary:      Effort: Pulmonary effort is normal       Breath sounds: Normal breath sounds  No wheezing  Abdominal:      General: Bowel sounds are normal       Palpations: Abdomen is soft  Tenderness: There is no abdominal tenderness  There is no guarding or rebound     Skin:     Capillary Refill: Capillary refill takes less than 2 seconds  Comments: No ulcerations or other lesions or rashes visualized  Neurological:      Mental Status: He is alert and oriented to person, place, and time     Psychiatric:         Behavior: Behavior normal

## 2022-12-07 NOTE — PATIENT INSTRUCTIONS
Take all antibiotics as prescribed  Warm compress to the area  Call PCP to schedule a follow-up appt in the next 2-3 days for reevaluation and to ensure resolution of symptoms  Go to the nearest ER for evaluation if any fevers, redness, warmth, discharge, streaking redness, redness that is circumferential, bleeding, pain, signs of infection, new or worsening symptoms, or other concerning symptoms

## 2022-12-30 ENCOUNTER — TELEPHONE (OUTPATIENT)
Dept: FAMILY MEDICINE CLINIC | Facility: CLINIC | Age: 72
End: 2022-12-30

## 2022-12-30 ENCOUNTER — CLINICAL SUPPORT (OUTPATIENT)
Dept: FAMILY MEDICINE CLINIC | Facility: CLINIC | Age: 72
End: 2022-12-30

## 2022-12-30 VITALS — OXYGEN SATURATION: 98 % | HEART RATE: 78 BPM | DIASTOLIC BLOOD PRESSURE: 74 MMHG | SYSTOLIC BLOOD PRESSURE: 130 MMHG

## 2022-12-30 DIAGNOSIS — I10 ESSENTIAL HYPERTENSION: Primary | ICD-10-CM

## 2022-12-30 NOTE — TELEPHONE ENCOUNTER
Patient came in the office for a BP check today, reports he is feeling well  Denies any symptoms  Took medications today       BP- 130/74  Pulse- 78  O2- 98

## 2023-01-18 DIAGNOSIS — N39.41 URGE INCONTINENCE: ICD-10-CM

## 2023-01-18 DIAGNOSIS — R35.1 BENIGN PROSTATIC HYPERPLASIA WITH NOCTURIA: ICD-10-CM

## 2023-01-18 DIAGNOSIS — N40.1 BENIGN PROSTATIC HYPERPLASIA WITH NOCTURIA: ICD-10-CM

## 2023-01-18 RX ORDER — TAMSULOSIN HYDROCHLORIDE 0.4 MG/1
CAPSULE ORAL
Qty: 90 CAPSULE | Refills: 3 | Status: SHIPPED | OUTPATIENT
Start: 2023-01-18

## 2023-02-07 ENCOUNTER — TELEPHONE (OUTPATIENT)
Dept: FAMILY MEDICINE CLINIC | Facility: CLINIC | Age: 73
End: 2023-02-07

## 2023-02-07 NOTE — TELEPHONE ENCOUNTER
Patient called office stating he started 3 days ago with cough and congestion  No other symptoms  Taking Ibuprofen OTC  Has not taken any covid tests at home  Asking if you can send something in or if you want him seen

## 2023-02-07 NOTE — TELEPHONE ENCOUNTER
Wife notified  She verbalized understanding  Scheduled him for Thursday with you, and he will cancel if he feels better

## 2023-02-07 NOTE — TELEPHONE ENCOUNTER
I recommend OTC Dayquil/Nyquil capsules, not syrup because of his glucose levels  We typically don't give antibiotics unless he has this and is worsening more than a week  You can offer him a covid/flu swab  Do you want to schedule him with me on Thursday or Friday for a follow up and if he isn't improving by then he would be eligible for antibiotics and or a chest xray if needed  If he is feeling better he can cancel the appointment

## 2023-02-09 ENCOUNTER — OFFICE VISIT (OUTPATIENT)
Dept: FAMILY MEDICINE CLINIC | Facility: CLINIC | Age: 73
End: 2023-02-09

## 2023-02-09 VITALS
DIASTOLIC BLOOD PRESSURE: 70 MMHG | SYSTOLIC BLOOD PRESSURE: 132 MMHG | HEIGHT: 66 IN | BODY MASS INDEX: 45.64 KG/M2 | HEART RATE: 79 BPM | TEMPERATURE: 97.8 F | OXYGEN SATURATION: 99 % | RESPIRATION RATE: 20 BRPM | WEIGHT: 284 LBS

## 2023-02-09 DIAGNOSIS — E66.01 MORBID OBESITY WITH BMI OF 40.0-44.9, ADULT (HCC): ICD-10-CM

## 2023-02-09 DIAGNOSIS — J06.9 ACUTE UPPER RESPIRATORY INFECTION, UNSPECIFIED: ICD-10-CM

## 2023-02-09 DIAGNOSIS — E26.9 HYPERALDOSTERONISM (HCC): ICD-10-CM

## 2023-02-09 DIAGNOSIS — C64.9 RENAL CELL CARCINOMA, UNSPECIFIED LATERALITY (HCC): ICD-10-CM

## 2023-02-09 DIAGNOSIS — E11.29 TYPE 2 DIABETES MELLITUS WITH MICROALBUMINURIA, WITHOUT LONG-TERM CURRENT USE OF INSULIN (HCC): ICD-10-CM

## 2023-02-09 DIAGNOSIS — B34.9 ACUTE VIRAL SYNDROME: Primary | ICD-10-CM

## 2023-02-09 DIAGNOSIS — R80.9 TYPE 2 DIABETES MELLITUS WITH MICROALBUMINURIA, WITHOUT LONG-TERM CURRENT USE OF INSULIN (HCC): ICD-10-CM

## 2023-02-09 RX ORDER — PREDNISONE 10 MG/1
10 TABLET ORAL 2 TIMES DAILY WITH MEALS
Qty: 10 TABLET | Refills: 0 | Status: SHIPPED | OUTPATIENT
Start: 2023-02-09

## 2023-02-09 RX ORDER — AZITHROMYCIN 250 MG/1
TABLET, FILM COATED ORAL
Qty: 6 TABLET | Refills: 0 | Status: SHIPPED | OUTPATIENT
Start: 2023-02-09 | End: 2023-02-13

## 2023-02-09 NOTE — PROGRESS NOTES
[facility]        NAME: Naatlie Martinez is a 67 y o  male  : 1950    MRN: [id number]  DATE: 2023  TIME: 11:20 AM    Assessment and Plan   Acute viral syndrome [B34 9]  1  Acute viral syndrome  predniSONE 10 mg tablet    azithromycin (ZITHROMAX) 250 mg tablet    Covid/Flu- Office Collect      2  Renal cell carcinoma, unspecified laterality ([place] [address] )        3  Morbid obesity with BMI of 40 0-44 9, adult ([place] [address] )        4  Hyperaldosteronism ([place] [address] )        5  Type 2 diabetes mellitus with microalbuminuria, without long-term current use of insulin ([facility])  Hemoglobin A1C      6  Acute upper respiratory infection, unspecified  Covid/Flu- Office Collect            Patient Instructions     Patient Instructions   Discussed viral vs bacterial infection  Zithromax and Prednisone as directed, aware Prednisone can increase blood sugar levels- strictly avoid foods/drinks with sugar  Flu/covid swab sent to lab  Continue OTC Dayqul/Nyquil capsules if helping for symptoms  Call for problems/concerns          Chief Complaint     Chief Complaint   Patient presents with   • Cold Like Symptoms         History of Present Illness       Congestion, sinus and chest- no fever  Diarrhea on/off- "not often"  Denies sore throat, denies ear pain  No rashes  Denies headache/bodyaches  Increased fatigue      Review of Systems   Review of Systems   Constitutional: Positive for fatigue  Negative for activity change, diaphoresis and fever  HENT: Positive for congestion and rhinorrhea  Negative for facial swelling, hearing loss, sinus pressure, sinus pain, sneezing, sore throat and voice change  Eyes: Negative for discharge and visual disturbance  Respiratory: Positive for cough and wheezing  Negative for choking, chest tightness, shortness of breath and stridor  Cardiovascular: Negative for chest pain, palpitations and leg swelling  Gastrointestinal: Positive for diarrhea   Negative for abdominal distention, abdominal pain, constipation, nausea and vomiting  Endocrine: Negative for polydipsia, polyphagia and polyuria  Genitourinary: Negative for difficulty urinating, dysuria, frequency and urgency  Musculoskeletal: Negative for arthralgias, back pain, gait problem, joint swelling, myalgias, neck pain and neck stiffness  Skin: Negative for color change, rash and wound  Neurological: Negative for dizziness, syncope, speech difficulty, weakness, light-headedness and headaches  Hematological: Negative for adenopathy  Does not bruise/bleed easily  Psychiatric/Behavioral: Negative for agitation, behavioral problems, confusion, hallucinations, sleep disturbance and suicidal ideas  The patient is not nervous/anxious  Current Medications       Current Outpatient Medications:   •  azithromycin (ZITHROMAX) 250 mg tablet, Take 2 tablets today then 1 tablet daily x 4 days, Disp: 6 tablet, Rfl: 0  •  predniSONE 10 mg tablet, Take 1 tablet (10 mg total) by mouth 2 (two) times a day with meals, Disp: 10 tablet, Rfl: 0  •  amLODIPine (NORVASC) 10 mg tablet, Take 1 tablet (10 mg total) by mouth daily, Disp: 90 tablet, Rfl: 3  •  NELLY ASPIRIN EC LOW DOSE PO, Take 81 mg by mouth daily, Disp: , Rfl:   •  ezetimibe (ZETIA) 10 mg tablet, Take 1 tablet (10 mg total) by mouth daily, Disp: 90 tablet, Rfl: 3  •  glucose blood test strip, Use to test sugars four times daily CONTOUR TEST STRIP, Disp: 100 each, Rfl: 5  •  hydrochlorothiazide (HYDRODIURIL) 12 5 mg tablet, Take 1 tablet (12 5 mg total) by mouth daily, Disp: 90 tablet, Rfl: 3  •  metFORMIN (GLUCOPHAGE) 500 mg tablet, 2 In the  Morning, 1 At Night, Disp: 270 tablet, Rfl: 3  •  Multiple Vitamins-Minerals (CENTRUM SILVER 50+MEN PO), Centrum Silver, Disp: , Rfl:   •  OneTouch Delica Lancets 13R MISC, Use to test sugars three times daily   E11 9, Disp: 100 each, Rfl: 5  •  pioglitazone (ACTOS) 15 mg tablet, Take 1 tablet (15 mg total) by mouth daily, Disp: 90 tablet, Rfl: 3  •  potassium chloride (K-DUR,KLOR-CON) 20 mEq tablet, Take 1 tablet (20 mEq total) by mouth daily, Disp: 90 tablet, Rfl: 3  •  Probiotic Product (PROBIOTIC ADVANCED PO), Take by mouth, Disp: , Rfl:   •  rosuvastatin (CRESTOR) 5 mg tablet, Take 1 tablet (5 mg total) by mouth daily, Disp: 90 tablet, Rfl: 3  •  tamsulosin (FLOMAX) 0 4 mg, TAKE 1 CAPSULE DAILY WITH DINNER, Disp: 90 capsule, Rfl: 3  •  valsartan (DIOVAN) 160 mg tablet, Take 1 tablet (160 mg total) by mouth daily, Disp: 90 tablet, Rfl: 3    Current Allergies     Allergies as of 02/09/2023   • (No Known Allergies)            The following portions of the patient's history were reviewed and updated as appropriate: allergies, current medications, past family history, past medical history, past social history, past surgical history and problem list      Past Medical History:   Diagnosis Date   • Carotid artery occlusion    • Disease of thyroid gland    • Hypertension    • Mixed hyperlipidemia    • Peripheral vascular disease ([facility] [address] )    • Renal mass        Past Surgical History:   Procedure Laterality Date   • APPENDECTOMY     • CARPAL TUNNEL RELEASE     • CHOLECYSTECTOMY     • COLONOSCOPY  2014 12 polyps    • IR CRYOABLATION  10/20/2021   • JOINT REPLACEMENT Left     TKR   • JOINT REPLACEMENT Right     Total knee replacement    • SHOULDER SURGERY Left     repair        Family History   Problem Relation Age of Onset   • Breast cancer Mother    • Heart disease Father    • Stroke Father    • Breast cancer Sister    • Sudden death Brother         MVA   • Obesity Brother    • Diabetes Brother    • Obesity Brother          Medications have been verified  Objective   /70   Pulse 79   Temp 97 8 °F (36 6 °C) (Tympanic)   Resp 20   Ht 5' 6" (1 676 m)   Wt 129 kg (284 lb)   SpO2 99%   BMI 45 84 kg/m²        Physical Exam     Physical Exam  Vitals and nursing note reviewed  Constitutional:       General: He is not in acute distress  Appearance: He is well-developed  He is not diaphoretic  HENT:      Head: Normocephalic and atraumatic  Right Ear: Tympanic membrane, ear canal and external ear normal       Left Ear: Tympanic membrane, ear canal and external ear normal       Nose: Congestion and rhinorrhea present  Mouth/Throat:      Mouth: Mucous membranes are moist       Pharynx: Uvula midline  No oropharyngeal exudate  Eyes:      General:         Right eye: No discharge  Left eye: No discharge  Conjunctiva/sclera: Conjunctivae normal       Pupils: Pupils are equal, round, and reactive to light  Neck:      Thyroid: No thyromegaly  Trachea: No tracheal deviation  Cardiovascular:      Rate and Rhythm: Normal rate and regular rhythm  Heart sounds: Murmur heard  Pulmonary:      Effort: Pulmonary effort is normal  No respiratory distress  Breath sounds: Rhonchi (mild) present  No wheezing  Musculoskeletal:         General: Normal range of motion  Cervical back: Normal range of motion and neck supple  Lymphadenopathy:      Cervical: No cervical adenopathy  Skin:     General: Skin is warm and dry  Neurological:      Mental Status: He is alert and oriented to person, place, and time  Psychiatric:         Mood and Affect: Mood normal          Behavior: Behavior normal          Thought Content:  Thought content normal          Judgment: Judgment normal

## 2023-02-09 NOTE — PATIENT INSTRUCTIONS
Discussed viral vs bacterial infection  Zithromax and Prednisone as directed, aware Prednisone can increase blood sugar levels- strictly avoid foods/drinks with sugar  Flu/covid swab sent to lab  Continue OTC Dayqul/Nyquil capsules if helping for symptoms  Call for problems/concerns

## 2023-02-10 LAB
FLUAV RNA RESP QL NAA+PROBE: NEGATIVE
FLUBV RNA RESP QL NAA+PROBE: NEGATIVE
SARS-COV-2 RNA RESP QL NAA+PROBE: NEGATIVE

## 2023-02-18 ENCOUNTER — APPOINTMENT (OUTPATIENT)
Dept: LAB | Facility: CLINIC | Age: 73
End: 2023-02-18

## 2023-02-18 DIAGNOSIS — C64.9 RENAL CELL CARCINOMA, UNSPECIFIED LATERALITY (HCC): ICD-10-CM

## 2023-02-18 LAB
ANION GAP SERPL CALCULATED.3IONS-SCNC: 6 MMOL/L (ref 4–13)
BUN SERPL-MCNC: 22 MG/DL (ref 5–25)
CALCIUM SERPL-MCNC: 9.6 MG/DL (ref 8.3–10.1)
CHLORIDE SERPL-SCNC: 107 MMOL/L (ref 96–108)
CO2 SERPL-SCNC: 25 MMOL/L (ref 21–32)
CREAT SERPL-MCNC: 1.04 MG/DL (ref 0.6–1.3)
CREAT UR-MCNC: 135 MG/DL
GFR SERPL CREATININE-BSD FRML MDRD: 71 ML/MIN/1.73SQ M
GLUCOSE P FAST SERPL-MCNC: 137 MG/DL (ref 65–99)
MICROALBUMIN UR-MCNC: 204 MG/L (ref 0–20)
MICROALBUMIN/CREAT 24H UR: 151 MG/G CREATININE (ref 0–30)
POTASSIUM SERPL-SCNC: 4.1 MMOL/L (ref 3.5–5.3)
SODIUM SERPL-SCNC: 138 MMOL/L (ref 135–147)

## 2023-02-23 ENCOUNTER — APPOINTMENT (OUTPATIENT)
Dept: CT IMAGING | Facility: HOSPITAL | Age: 73
End: 2023-02-23

## 2023-02-23 ENCOUNTER — HOSPITAL ENCOUNTER (OUTPATIENT)
Dept: CT IMAGING | Facility: HOSPITAL | Age: 73
End: 2023-02-23

## 2023-02-23 DIAGNOSIS — C64.9 RENAL CELL CARCINOMA, UNSPECIFIED LATERALITY (HCC): ICD-10-CM

## 2023-02-23 DIAGNOSIS — R91.1 LUNG NODULE SEEN ON IMAGING STUDY: ICD-10-CM

## 2023-02-23 RX ADMIN — IOHEXOL 100 ML: 350 INJECTION, SOLUTION INTRAVENOUS at 10:27

## 2023-03-01 ENCOUNTER — APPOINTMENT (OUTPATIENT)
Dept: LAB | Facility: CLINIC | Age: 73
End: 2023-03-01

## 2023-03-01 DIAGNOSIS — Z12.5 SCREENING PSA (PROSTATE SPECIFIC ANTIGEN): ICD-10-CM

## 2023-03-01 LAB
ANION GAP SERPL CALCULATED.3IONS-SCNC: 4 MMOL/L (ref 4–13)
BUN SERPL-MCNC: 22 MG/DL (ref 5–25)
CALCIUM SERPL-MCNC: 10 MG/DL (ref 8.3–10.1)
CHLORIDE SERPL-SCNC: 107 MMOL/L (ref 96–108)
CO2 SERPL-SCNC: 29 MMOL/L (ref 21–32)
CREAT SERPL-MCNC: 1.14 MG/DL (ref 0.6–1.3)
GFR SERPL CREATININE-BSD FRML MDRD: 63 ML/MIN/1.73SQ M
GLUCOSE SERPL-MCNC: 112 MG/DL (ref 65–140)
POTASSIUM SERPL-SCNC: 4.3 MMOL/L (ref 3.5–5.3)
SODIUM SERPL-SCNC: 140 MMOL/L (ref 135–147)

## 2023-03-06 ENCOUNTER — OFFICE VISIT (OUTPATIENT)
Dept: UROLOGY | Facility: CLINIC | Age: 73
End: 2023-03-06

## 2023-03-06 VITALS
BODY MASS INDEX: 45.16 KG/M2 | OXYGEN SATURATION: 99 % | WEIGHT: 281 LBS | HEART RATE: 77 BPM | HEIGHT: 66 IN | DIASTOLIC BLOOD PRESSURE: 80 MMHG | SYSTOLIC BLOOD PRESSURE: 118 MMHG

## 2023-03-06 DIAGNOSIS — R31.0 GROSS HEMATURIA: ICD-10-CM

## 2023-03-06 DIAGNOSIS — C64.9 RENAL CELL CARCINOMA, UNSPECIFIED LATERALITY (HCC): Primary | ICD-10-CM

## 2023-03-06 DIAGNOSIS — R35.1 BENIGN PROSTATIC HYPERPLASIA WITH NOCTURIA: ICD-10-CM

## 2023-03-06 DIAGNOSIS — R91.1 PULMONARY NODULE: ICD-10-CM

## 2023-03-06 DIAGNOSIS — N28.89 BILATERAL RENAL MASSES: ICD-10-CM

## 2023-03-06 DIAGNOSIS — N40.1 BENIGN PROSTATIC HYPERPLASIA WITH NOCTURIA: ICD-10-CM

## 2023-03-06 DIAGNOSIS — R91.1 LUNG NODULE SEEN ON IMAGING STUDY: ICD-10-CM

## 2023-03-06 DIAGNOSIS — N39.41 URGE INCONTINENCE: ICD-10-CM

## 2023-03-06 PROBLEM — Z12.5 PROSTATE CANCER SCREENING: Status: ACTIVE | Noted: 2023-03-06

## 2023-03-06 RX ORDER — TAMSULOSIN HYDROCHLORIDE 0.4 MG/1
0.4 CAPSULE ORAL 2 TIMES DAILY
Qty: 180 CAPSULE | Refills: 3 | Status: SHIPPED | OUTPATIENT
Start: 2023-03-06

## 2023-03-06 NOTE — ASSESSMENT & PLAN NOTE
• Initially post op, Possibly secondary to riding 4 ramirez status post cryoablation  • No further episodes  • Follow-up 6 months

## 2023-03-06 NOTE — ASSESSMENT & PLAN NOTE
• Status post cryoablation 10/20/2021  • Pathology revealed papillary renal cell carcinoma, type 1  • CT decreased size of right upper pole neoplasm status post cryoablation  • CT abdomen 6 months

## 2023-03-06 NOTE — ASSESSMENT & PLAN NOTE
· AUA 14  · Increase hydration with water  · Avoid bladder irritants  · Declined pelvic floor physical therapy  · Increase Flomax to 0 4 mg twice daily  · Consider addition of Gemtesa  · Send urine for microscopic and culture  · Follow-up 6 months

## 2023-03-06 NOTE — PROGRESS NOTES
Assessment and plan:     Renal cell carcinoma ([facility] [address] )  • Status post cryoablation 10/20/2021  • Pathology revealed papillary renal cell carcinoma, type 1  • CT decreased size of right upper pole neoplasm status post cryoablation  • CT abdomen 6 months    Gross hematuria  • Initially post op, Possibly secondary to riding 4 ramirez status post cryoablation  • No further episodes  • Follow-up 6 months    Bilateral renal masses  · Bilateral subcentimeter heterogenous lesions in inferior pole of both kidneys concerning for neoplasm  · CT of abdomen in 6 months  · Follow-up 6 months    Lung nodule seen on imaging study  · Bilateral lung nodules  · New 7 mm nodular density at the bronchus possibly secretions  · Repeat CT of chest in 6 months  · Follow-up 6 months    Urge incontinence  · AUA 14  · Increase hydration with water  · Avoid bladder irritants  · Declined pelvic floor physical therapy  · Increase Flomax to 0 4 mg twice daily  · Consider addition of Gemtesa  · Send urine for microscopic and culture  · Follow-up 6 months    Prostate cancer screening  · PSA due end of this month  · YELENA unremarkable  · Follow-up 1 year       BRANDI Mayfield    History of Present Illness     Natalie Martinez is a 67 y o  male patient Dr Dumont Sharad a history of morbid obesity and diabetes, patient a history renal cystic disease   Patient had an MRI of the abdomen with and without contrast in 2019 at an outside institution which demonstrated right upper pole hemorrhagic cyst 1 2 cm   Patient then had an ultrasound that demonstrated progression of this concern with a renal protocol CT with questionable hypoenhancement   He underwent IR cryoablation and biopsy 10/20/2021 which revealed papillary renal cell neoplasm       At his previous visit 11/11/2021, < 1 month post cryoablation he was 4 wheeling and developed gross hematuria  He was advised not to ride his 4 ramirez for at least 2 weeks and then resume slowly    He did not require evaluation/procedure by Interventional Radiology for this  He denies any recurrent episodes of gross hematuria  He notes urinary urgency with urge incontinence      He has bph and is on flomax for the same  Nocturia x1-2 , down from 4  He increased his water intake and decreased soda intake  Laboratory     Lab Results   Component Value Date    BUN 22 03/01/2023    CREATININE 1 14 03/01/2023       No components found for: GFR    Lab Results   Component Value Date    CALCIUM 10 0 03/01/2023     05/02/2018    K 4 3 03/01/2023    CO2 29 03/01/2023     03/01/2023       Lab Results   Component Value Date    WBC 8 05 10/18/2022    HGB 14 0 10/18/2022    HCT 44 2 10/18/2022    MCV 94 10/18/2022     10/18/2022       Lab Results   Component Value Date    PSA 0 5 03/31/2022    PSA 0 4 03/16/2021    PSA 0 5 02/24/2020     Surgical Pathology Report                         Case: [id number]                                    Authorizing Provider: Himanshu Aj MD   Collected:           10/20/2021 1049               Ordering Location:     [facility]      Received:            10/20/2021 [address] [place] [address] Short Stay Center                                                    Pathologist:           Aicha Mercado MD                                                          Specimen:    Kidney, Right, right renal mass                                                            Final Diagnosis   A  Right renal mass:  - Papillary renal cell neoplasm, favor papillary renal cell carcinoma, type I (assuming tumor is > 15 millimeters diameter, as suggested by ultrasound studies - see Note)  Electronically signed by David Willis MD on 10/22/2021 at 12:26 PM   Note    Routine H&E sections demonstrate papillary fronds of neoplastic but uniform, cytologically bland single-layer cuboidal epithelium surrounding stalks distended by foamy macrophages  Immunohistochemical stains indicate the following neoplastic cell immunophenotype:   * Positive: PAX 8 & PAX 2 (each diffusely strong nuclear), AMACR (p504s, diffusely strong cytoplasmic),  (diffusely strong cytoplasmic), CK7 (diffusely strong cytoplasmic membrane), carbonic anhydrase (focal cytoplasmic membrane in 20% subset)   * Negative:  WT-1  Composite morphologic & immunophenotypic findings support the diagnosis of papillary renal cell neoplasm, favor papillary renal cell carcinoma, type I (assuming tumor is > 15 millimeters diameter, as suggested by ultrasound studies)  Intradepartmental consultation concurs with this diagnosis  Final report is faxed by Dr Ansari Diss to Dr Arnold Dietrich @ 12:25 PM, 10/22/2021      Clinical Data:  - Renal ultrasound (7/1/2021, excerpt):    IMPRESSION:  Right upper pole echogenic cortical lesion measuring 2 7 cm either a hemorrhagic/proteinaceous cyst or solid mass   Recommend dedicated CT or MRI follow-up  - Right renal mass cryoablation (10/20/2021): Blood loss: minimal, Specimens: 18 gauge core biopsy right renal mass, Findings: Successful cryoablation and biopsy of the right renal mass, Complications: None immediate, Anesthesia: general anesthesia   Additional Information    All reported additional testing was performed with appropriately reactive controls   These tests were developed and their performance characteristics determined by [facility] or appropriate performing facility, though some tests may be performed on tissues which have not been validated for performance characteristics (such as staining performed on alcohol exposed cell blocks and decalcified tissues)   Results should be interpreted with caution and in the context of the patients’ clinical condition  These tests may not be cleared or approved by the [place]  Food and Drug Administration, though the FDA has determined that such clearance or approval is not necessary   These tests are used for clinical purposes and they should not be regarded as investigational or for research  This laboratory has been approved by [place] [id number], designated as a high-complexity laboratory and is qualified to perform these tests  [facility] Description       A  The specimen is received in formalin, labeled with the patient's name and hospital number, and is designated "right renal mass”  The specimen consists of a 0 8 x 0 7 x 0 1 cm aggregate of tan-brown soft tissue fragments  The specimen is entirely submitted in an embedding bag in 1 cassette      Note: The estimated total formalin fixation time based upon information provided by the submitting clinician and the standard processing schedule is under 72 hours  RRavotti           No results found for this or any previous visit (from the past 1 hour(s))  @RESULT(URINEMICROSCOPIC)@    @RESULT(URINECULTURE)@    Radiology     CT CHEST AND ABDOMEN WITH IV CONTRAST 2/23/2023     INDICATION:   C64 9: Malignant neoplasm of unspecified kidney, except renal pelvis  R91 1: Solitary pulmonary nodule  History of right renal mass, papillary renal cell neoplasm status post cryoablation October 2021  Appendectomy  Cholecystectomy      COMPARISON: CT chest 02/14/2022, 08/06/2021, 09/06/2019  CT abdomen 02/14/2022   07/15/2021     TECHNIQUE:  CT examination of the chest and abdomen was performed  Axial, sagittal, and coronal 2D reformatted images were created from the source data and submitted for interpretation      Radiation dose length product (DLP) for this visit:  2482  36 mGy-cm     This examination, like all CT scans performed in the [facility], was performed utilizing techniques to minimize radiation dose exposure, including the use of   iterative reconstruction and automated exposure control      IV Contrast:  100 mL of iohexol (OMNIPAQUE)  Enteric Contrast:  Enteric contrast was not administered      FINDINGS:     CHEST     LUNGS:  Approximate 7 mm nodular density at the bronchus intermedius bifurcation may represent a small amount of mucus or secretions  3/143      Biapical blebs, larger on the left  3 mm right upper lobe nodule 3/102, stable  4 x 5 mm juxtapleural right lower lobe nodule 3/198, stable  2 mm left upper lobe nodule 3/120, stable      PLEURA:  Unremarkable      HEART/GREAT VESSELS: Atherosclerotic coronary artery calcification   Heart is otherwise unremarkable  No thoracic aortic aneurysm      MEDIASTINUM AND SUJIT:  Subcentimeter mediastinal lymph nodes  No lymphadenopathy      CHEST WALL AND LOWER NECK:  Unremarkable      ABDOMEN     LIVER/BILIARY TREE:  Unremarkable      GALLBLADDER:  Gallbladder is surgically absent      SPLEEN:  Unremarkable      PANCREAS:  Unremarkable      ADRENAL GLANDS:  Unremarkable      KIDNEYS/URETERS:  16 x 20 x 14  mm low-attenuation area at the superior pole of the right kidney with surrounding stranding, previously measuring 26 x 21 x 20 mm  There is a surrounding halo of fat  A noncontrasted sequence was not obtained        At the inferior lateral margin of the right kidney is a partially exophytic approximate 9 mm round heterogeneously enhancing lesion, in retrospect this previously measured approximately 7 x 5 mm  Heterogeneous partially exophytic lesion at the inferior   pole of the left kidney measuring 9 x 7 mm axially in retrospect this previously measured 6 x 5 mm  Bilateral parapelvic cysts  No hydronephrosis        VISUALIZED BOWEL:  Unremarkable      VISUALIZED ABDOMINAL CAVITY:  No ascites or free air  No fluid collection      VESSELS:  Atherosclerotic calcification of the aorta and its major branches  No abdominal aortic aneurysm         OSSEOUS STRUCTURES:  No acute fracture or destructive osseous lesion  Degenerative changes    Left humeral anchor      IMPRESSION:     Interval decreased size of right upper pole renal neoplasm status post cryoablation      Subcentimeter heterogeneous lesions at the inferior pole of both kidneys, concerning for neoplasm      7 mm nodular density at the bronchus intermedius may represent small amounts of mucus or secretions  Recommend 3 month follow-up      Additional findings as above      This study demonstrates a significant  finding and was documented as such in Epic for liaison and referring practitioner notification  Review of Systems     Review of Systems   Constitutional: Negative for activity change, appetite change, chills, fatigue, fever and unexpected weight change  HENT: Negative for facial swelling  Eyes: Negative for discharge  Respiratory: Negative  Negative for cough and shortness of breath  Cardiovascular: Negative for chest pain and leg swelling  Gastrointestinal: Negative  Negative for abdominal distention, abdominal pain, constipation, diarrhea, nausea and vomiting  Endocrine: Negative  Genitourinary: Positive for urgency  Negative for decreased urine volume, difficulty urinating, dysuria, enuresis, flank pain, frequency, genital sores and hematuria  Postvoid dribbling   Musculoskeletal: Negative for back pain and myalgias  Skin: Negative for pallor and rash  Allergic/Immunologic: Negative  Negative for immunocompromised state  Neurological: Negative for facial asymmetry and speech difficulty  Psychiatric/Behavioral: Negative for agitation and confusion  AUA SYMPTOM SCORE    Flowsheet Row Most Recent Value   AUA SYMPTOM SCORE    How often have you had a sensation of not emptying your bladder completely after you finished urinating? 2   How often have you had to urinate again less than two hours after you finished urinating? 1   How often have you found you stopped and started again several times when you urinate? 2   How often have you found it difficult to postpone urination? 4   How often have you had a weak urinary stream? 3   How often have you had to push or strain to begin urination?  0   How many times did you most typically get up to urinate from the time you went to bed at night until the time you got up in the morning? 2   Quality of Life: If you were to spend the rest of your life with your urinary condition just the way it is now, how would you feel about that? 3   AUA SYMPTOM SCORE 14                Allergies     No Known Allergies    Physical Exam     Physical Exam  Vitals reviewed  Constitutional:       General: He is not in acute distress  Appearance: Normal appearance  He is obese  He is not ill-appearing, toxic-appearing or diaphoretic  HENT:      Head: Normocephalic and atraumatic  Eyes:      General: No scleral icterus  Cardiovascular:      Rate and Rhythm: Normal rate  Pulmonary:      Effort: Pulmonary effort is normal  No respiratory distress  Abdominal:      General: Abdomen is flat  There is no distension  Palpations: Abdomen is soft  Tenderness: There is no abdominal tenderness  There is no right CVA tenderness, left CVA tenderness, guarding or rebound  Musculoskeletal:         General: No swelling  Cervical back: Normal range of motion  Skin:     General: Skin is warm and dry  Coloration: Skin is not jaundiced or pale  Findings: No rash  Neurological:      General: No focal deficit present  Mental Status: He is alert and oriented to person, place, and time  Gait: Gait normal    Psychiatric:         Mood and Affect: Mood normal          Behavior: Behavior normal          Thought Content:  Thought content normal          Judgment: Judgment normal          Vital Signs     Vitals:    03/06/23 1057   BP: 118/80   BP Location: Left arm   Patient Position: Sitting   Cuff Size: Large   Pulse: 77   SpO2: 99%   Weight: 127 kg (281 lb)   Height: 5' 6" (1 676 m)       Current Medications       Current Outpatient Medications:   •  amLODIPine (NORVASC) 10 mg tablet, Take 1 tablet (10 mg total) by mouth daily, Disp: 90 tablet, Rfl: 3  •  NELLY ASPIRIN EC LOW DOSE PO, Take 81 mg by mouth daily, Disp: , Rfl:   •  ezetimibe (ZETIA) 10 mg tablet, Take 1 tablet (10 mg total) by mouth daily, Disp: 90 tablet, Rfl: 3  •  glucose blood test strip, Use to test sugars four times daily CONTOUR TEST STRIP, Disp: 100 each, Rfl: 5  •  hydrochlorothiazide (HYDRODIURIL) 12 5 mg tablet, Take 1 tablet (12 5 mg total) by mouth daily, Disp: 90 tablet, Rfl: 3  •  metFORMIN (GLUCOPHAGE) 500 mg tablet, 2 In the  Morning, 1 At Night, Disp: 270 tablet, Rfl: 3  •  Multiple Vitamins-Minerals (CENTRUM SILVER 50+MEN PO), Centrum Silver, Disp: , Rfl:   •  OneTouch Delica Lancets 75L MISC, Use to test sugars three times daily   E11 9, Disp: 100 each, Rfl: 5  •  pioglitazone (ACTOS) 15 mg tablet, Take 1 tablet (15 mg total) by mouth daily, Disp: 90 tablet, Rfl: 3  •  potassium chloride (K-DUR,KLOR-CON) 20 mEq tablet, Take 1 tablet (20 mEq total) by mouth daily, Disp: 90 tablet, Rfl: 3  •  Probiotic Product (PROBIOTIC ADVANCED PO), Take by mouth, Disp: , Rfl:   •  rosuvastatin (CRESTOR) 5 mg tablet, Take 1 tablet (5 mg total) by mouth daily, Disp: 90 tablet, Rfl: 3  •  tamsulosin (FLOMAX) 0 4 mg, Take 1 capsule (0 4 mg total) by mouth 2 (two) times a day, Disp: 180 capsule, Rfl: 3  •  valsartan (DIOVAN) 160 mg tablet, Take 1 tablet (160 mg total) by mouth daily, Disp: 90 tablet, Rfl: 3  •  predniSONE 10 mg tablet, Take 1 tablet (10 mg total) by mouth 2 (two) times a day with meals, Disp: 10 tablet, Rfl: 0    Active Problems     Patient Active Problem List   Diagnosis   • Encounter for diabetic foot exam ([facility] [address] )   • Morbid obesity with BMI of 40 0-44 9, adult ([facility] [address] )   • Essential hypertension   • Lung nodule seen on imaging study   • Mixed hyperlipidemia   • Bilateral renal masses   • Edema   • Hyperaldosteronism (HCC)   • Type 2 diabetes mellitus with microalbuminuria, without long-term current use of insulin (HCC)   • Gross hematuria   • Renal cell carcinoma (HCC)   • Benign prostatic hyperplasia with nocturia   • Heart murmur   • Urge incontinence   • Prostate cancer screening       Past Medical History     Past Medical History:   Diagnosis Date   • Carotid artery occlusion    • Disease of thyroid gland    • Hypertension    • Mixed hyperlipidemia    • Peripheral vascular disease ([facility] [address] )    • Renal mass        Surgical History     Past Surgical History:   Procedure Laterality Date   • APPENDECTOMY     • CARPAL TUNNEL RELEASE     • CHOLECYSTECTOMY     • COLONOSCOPY  2014 12 polyps    • IR CRYOABLATION  10/20/2021   • JOINT REPLACEMENT Left     TKR   • JOINT REPLACEMENT Right     Total knee replacement    • SHOULDER SURGERY Left     repair        Family History     Family History   Problem Relation Age of Onset   • Breast cancer Mother    • Heart disease Father    • Stroke Father    • Breast cancer Sister    • Sudden death Brother         MVA   • Obesity Brother    • Diabetes Brother    • Obesity Brother        Social History     Social History     Social History     Tobacco Use   Smoking Status Never   Smokeless Tobacco Never   Tobacco Comments    Former smoker - As per [employer]        Past Surgical History:   Procedure Laterality Date   • APPENDECTOMY     • CARPAL TUNNEL RELEASE     • CHOLECYSTECTOMY     • COLONOSCOPY  2014 12 polyps    • IR CRYOABLATION  10/20/2021   • JOINT REPLACEMENT Left     TKR   • JOINT REPLACEMENT Right     Total knee replacement    • SHOULDER SURGERY Left     repair          The following portions of the patient's history were reviewed and updated as appropriate: allergies, current medications, past family history, past medical history, past social history, past surgical history and problem list    Please note :  Voice dictation software has been used to create this document  There may be inadvertent transcription errors      [id number] [place] [id number] Mamta Rudolph

## 2023-03-06 NOTE — ASSESSMENT & PLAN NOTE
· Bilateral lung nodules  · New 7 mm nodular density at the bronchus possibly secretions  · Repeat CT of chest in 6 months  · Follow-up 6 months

## 2023-03-06 NOTE — ASSESSMENT & PLAN NOTE
· Bilateral subcentimeter heterogenous lesions in inferior pole of both kidneys concerning for neoplasm  · CT of abdomen in 6 months  · Follow-up 6 months

## 2023-03-14 ENCOUNTER — TELEPHONE (OUTPATIENT)
Dept: UROLOGY | Facility: AMBULATORY SURGERY CENTER | Age: 73
End: 2023-03-14

## 2023-03-14 NOTE — TELEPHONE ENCOUNTER
Are his urinary symptoms tolerable on 1 tablet?   Can either add finasteride or switch him to something else besides tamsulosin if he is interested otherwise we can monitor his symptoms on 1 dose of tamsulosin

## 2023-03-14 NOTE — TELEPHONE ENCOUNTER
Pt under care of Tanya Zavaleta     Pt wife called and left VM stating pt is getting headaches from taking tamsulosin (FLOMAX) 0 4 mg      twice a day so pt is only taking once a day    Pt call [phone]

## 2023-03-14 NOTE — TELEPHONE ENCOUNTER
Call placed to Pt and spoke with him who states that he is doing okay for now taking the 1 tablet of tamsulosin, Pt was advise if any changes he should contact our office

## 2023-04-28 ENCOUNTER — OFFICE VISIT (OUTPATIENT)
Dept: URGENT CARE | Facility: CLINIC | Age: 73
End: 2023-04-28

## 2023-04-28 VITALS
HEART RATE: 87 BPM | TEMPERATURE: 98 F | SYSTOLIC BLOOD PRESSURE: 144 MMHG | WEIGHT: 285 LBS | DIASTOLIC BLOOD PRESSURE: 65 MMHG | RESPIRATION RATE: 16 BRPM | BODY MASS INDEX: 45.8 KG/M2 | HEIGHT: 66 IN | OXYGEN SATURATION: 100 %

## 2023-04-28 DIAGNOSIS — J02.9 SORE THROAT: Primary | ICD-10-CM

## 2023-04-28 LAB — S PYO AG THROAT QL: NEGATIVE

## 2023-04-28 NOTE — PATIENT INSTRUCTIONS
Drink plenty of fluids, rest, saltwater gargles, lozenges, hot tea with honey  Tylenol for pain  If you develop a prolonged high fever, difficulty breathing, swallowing, managing secretions, decreased fluid intake, or urination, any new or concerning symptoms please return or proceed ER  Recommend following up with PCP in 3-5 days

## 2023-04-28 NOTE — PROGRESS NOTES
[facility]        NAME: Paul Hernández is a 67 y o  male  : 1950    MRN: [id number]  DATE: 2023  TIME: 10:28 AM    Assessment and Plan   Sore throat [J02 9]  1  Sore throat  POCT rapid strepA    Throat culture        Rapid strep negative  Patient Instructions     Patient Instructions   Drink plenty of fluids, rest, saltwater gargles, lozenges, hot tea with honey  Tylenol for pain  If you develop a prolonged high fever, difficulty breathing, swallowing, managing secretions, decreased fluid intake, or urination, any new or concerning symptoms please return or proceed ER  Recommend following up with PCP in 3-5 days  Chief Complaint     Chief Complaint   Patient presents with   • Sore Throat     Sore throat, cough,runny nose, afebrile  History of Present Illness       Sore Throat   This is a new problem  The current episode started yesterday  The problem has been unchanged  Neither side of throat is experiencing more pain than the other  The pain is mild  Associated symptoms include congestion  Pertinent negatives include no abdominal pain, coughing, diarrhea, ear pain, headaches, hoarse voice, neck pain, shortness of breath, stridor, swollen glands, trouble swallowing or vomiting  He has had no exposure to strep or mono  He has tried nothing for the symptoms  The treatment provided no relief  Review of Systems   Review of Systems   Constitutional: Negative for chills, diaphoresis, fatigue and fever  HENT: Positive for congestion, rhinorrhea and sore throat  Negative for ear pain, facial swelling, hoarse voice, mouth sores, postnasal drip, sinus pressure, sinus pain and trouble swallowing  Eyes: Negative for photophobia and visual disturbance  Respiratory: Negative for cough, chest tightness, shortness of breath and stridor  Cardiovascular: Negative for chest pain  Gastrointestinal: Negative for abdominal pain, diarrhea, nausea and vomiting  Genitourinary: Negative  Musculoskeletal: Negative for arthralgias, back pain, joint swelling, myalgias, neck pain and neck stiffness  Skin: Negative for rash  Neurological: Negative for dizziness, facial asymmetry, weakness, light-headedness, numbness and headaches  Current Medications       Current Outpatient Medications:     amLODIPine (NORVASC) 10 mg tablet, Take 1 tablet (10 mg total) by mouth daily, Disp: 90 tablet, Rfl: 3    NELLY ASPIRIN EC LOW DOSE PO, Take 81 mg by mouth daily, Disp: , Rfl:     ezetimibe (ZETIA) 10 mg tablet, Take 1 tablet (10 mg total) by mouth daily, Disp: 90 tablet, Rfl: 3    glucose blood test strip, Use to test sugars four times daily CONTOUR TEST STRIP, Disp: 100 each, Rfl: 5    hydrochlorothiazide (HYDRODIURIL) 12 5 mg tablet, Take 1 tablet (12 5 mg total) by mouth daily, Disp: 90 tablet, Rfl: 3    metFORMIN (GLUCOPHAGE) 500 mg tablet, 2 In the  Morning, 1 At Night, Disp: 270 tablet, Rfl: 3    Multiple Vitamins-Minerals (CENTRUM SILVER 50+MEN PO), Centrum Silver, Disp: , Rfl:     OneTouch Delica Lancets 86X MISC, Use to test sugars three times daily   E11 9, Disp: 100 each, Rfl: 5    pioglitazone (ACTOS) 15 mg tablet, Take 1 tablet (15 mg total) by mouth daily, Disp: 90 tablet, Rfl: 3    potassium chloride (K-DUR,KLOR-CON) 20 mEq tablet, Take 1 tablet (20 mEq total) by mouth daily, Disp: 90 tablet, Rfl: 3    Probiotic Product (PROBIOTIC ADVANCED PO), Take by mouth, Disp: , Rfl:     rosuvastatin (CRESTOR) 5 mg tablet, Take 1 tablet (5 mg total) by mouth daily, Disp: 90 tablet, Rfl: 3    tamsulosin (FLOMAX) 0 4 mg, Take 1 capsule (0 4 mg total) by mouth 2 (two) times a day, Disp: 180 capsule, Rfl: 3    valsartan (DIOVAN) 160 mg tablet, Take 1 tablet (160 mg total) by mouth daily, Disp: 90 tablet, Rfl: 3    Current Allergies     Allergies as of 04/28/2023    (No Known Allergies)            The following portions of the patient's history were reviewed and "updated as appropriate: allergies, current medications, past family history, past medical history, past social history, past surgical history and problem list      Past Medical History:   Diagnosis Date   • Carotid artery occlusion    • Disease of thyroid gland    • Hypertension    • Mixed hyperlipidemia    • Peripheral vascular disease ([address] )    • Renal mass        Past Surgical History:   Procedure Laterality Date   • APPENDECTOMY     • CARPAL TUNNEL RELEASE     • CHOLECYSTECTOMY     • COLONOSCOPY  2014 12 polyps    • IR CRYOABLATION  10/20/2021   • JOINT REPLACEMENT Left     TKR   • JOINT REPLACEMENT Right     Total knee replacement    • SHOULDER SURGERY Left     repair        Family History   Problem Relation Age of Onset   • Breast cancer Mother    • Heart disease Father    • Stroke Father    • Breast cancer Sister    • Sudden death Brother         MVA   • Obesity Brother    • Diabetes Brother    • Obesity Brother          Medications have been verified  Objective   /65   Pulse 87   Temp 98 °F (36 7 °C)   Resp 16   Ht 5' 6\" (1 676 m)   Wt 129 kg (285 lb)   SpO2 100%   BMI 46 00 kg/m²   No LMP for male patient  Physical Exam     Physical Exam  Constitutional:       General: He is not in acute distress  Appearance: He is well-developed  HENT:      Head: Normocephalic and atraumatic  Right Ear: Hearing, tympanic membrane, ear canal and external ear normal       Left Ear: Hearing, tympanic membrane, ear canal and external ear normal       Nose: Nose normal       Mouth/Throat:      Mouth: Mucous membranes are moist       Pharynx: Oropharynx is clear  Uvula midline  Posterior oropharyngeal erythema present  No oropharyngeal exudate  Tonsils: No tonsillar exudate or tonsillar abscesses  1+ on the right  1+ on the left  Cardiovascular:      Rate and Rhythm: Normal rate and regular rhythm        Heart sounds: Normal heart sounds, S1 normal and S2 normal    Pulmonary:      " Effort: Pulmonary effort is normal       Breath sounds: Normal breath sounds and air entry  Lymphadenopathy:      Cervical: No cervical adenopathy  Skin:     General: Skin is warm and dry  Capillary Refill: Capillary refill takes less than 2 seconds  Neurological:      Mental Status: He is alert and oriented to person, place, and time

## 2023-04-30 LAB — BACTERIA THROAT CULT: NORMAL

## 2023-05-01 ENCOUNTER — TELEPHONE (OUTPATIENT)
Dept: FAMILY MEDICINE CLINIC | Facility: CLINIC | Age: 73
End: 2023-05-01

## 2023-05-01 DIAGNOSIS — J01.01 ACUTE RECURRENT MAXILLARY SINUSITIS: Primary | ICD-10-CM

## 2023-05-01 RX ORDER — AMOXICILLIN AND CLAVULANATE POTASSIUM 875; 125 MG/1; MG/1
1 TABLET, FILM COATED ORAL EVERY 12 HOURS SCHEDULED
Qty: 20 TABLET | Refills: 0 | Status: SHIPPED | OUTPATIENT
Start: 2023-05-01 | End: 2023-05-11

## 2023-05-01 NOTE — TELEPHONE ENCOUNTER
Patients wife called and wanted to see if something could be sent out for the patient as his throat has not gotten any better and he is still bringing up mucus, he has not taken anything OTC besides tylenol and Advil     If something is sent out the [facility] in [place] would be the preferred pharmacy

## 2023-05-05 PROBLEM — Z12.5 PROSTATE CANCER SCREENING: Status: RESOLVED | Noted: 2023-03-06 | Resolved: 2023-05-05

## 2023-07-01 ENCOUNTER — OFFICE VISIT (OUTPATIENT)
Dept: URGENT CARE | Facility: CLINIC | Age: 73
End: 2023-07-01
Payer: MEDICARE

## 2023-07-01 VITALS
TEMPERATURE: 98.4 F | HEIGHT: 67 IN | DIASTOLIC BLOOD PRESSURE: 71 MMHG | OXYGEN SATURATION: 96 % | SYSTOLIC BLOOD PRESSURE: 135 MMHG | HEART RATE: 84 BPM | RESPIRATION RATE: 18 BRPM | WEIGHT: 260 LBS | BODY MASS INDEX: 40.81 KG/M2

## 2023-07-01 DIAGNOSIS — T25.121A SUPERFICIAL BURN OF RIGHT FOOT, INITIAL ENCOUNTER: Primary | ICD-10-CM

## 2023-07-01 PROCEDURE — G0463 HOSPITAL OUTPT CLINIC VISIT: HCPCS | Performed by: NURSE PRACTITIONER

## 2023-07-01 PROCEDURE — 99213 OFFICE O/P EST LOW 20 MIN: CPT | Performed by: NURSE PRACTITIONER

## 2023-07-01 NOTE — PATIENT INSTRUCTIONS
Bacitracin and keep covered. Keep clean and dry. If you develop any increased pain, swelling, numbness, tingling, drainage, fever, redness or any new or concerning symptoms please return or proceed to ER. Follow up with pcp in 3-5 days.

## 2023-07-02 NOTE — PROGRESS NOTES
[facility]        NAME: Jorge Lee is a 67 y.o. male  : 1950    MRN: [id number]  DATE: 2023  TIME: 9:06 AM    Assessment and Plan   Superficial burn of right foot, initial encounter [T25.121A]  1. Superficial burn of right foot, initial encounter            Wound cleansed, bacitracin and bandage applied. Patient tolerated well. Patient Instructions     Patient Instructions   Bacitracin and keep covered. Keep clean and dry. If you develop any increased pain, swelling, numbness, tingling, drainage, fever, redness or any new or concerning symptoms please return or proceed to ER. Follow up with pcp in 3-5 days. Chief Complaint     Chief Complaint   Patient presents with   • Foot Burn     Patient reports burning top of right foot/ fourth digit last night while welding. History of Present Illness       Patient is a 79-year-old male presents with a 1 day history of a burn to his right foot. Patient states that he was welding when a spark dropped down and hit his right foot. Patient has not visualized injury, but is requesting that staff look between his toes. Notes mild pain. Denies any bleeding or drainage. Notes Tdap was in 2019. Denies any numbness or tingling. Denies any fever, chills, or body aches. Has not tried any over-the-counter treatment or cleaned the area. Review of Systems   Review of Systems   Constitutional: Negative for chills, diaphoresis, fatigue and fever. Respiratory: Negative. Cardiovascular: Negative. Musculoskeletal: Negative for arthralgias, back pain, joint swelling and myalgias. Skin: Positive for wound. Negative for rash. Neurological: Negative.           Current Medications       Current Outpatient Medications:   •  amLODIPine (NORVASC) 10 mg tablet, Take 1 tablet (10 mg total) by mouth daily, Disp: 90 tablet, Rfl: 3  •  NELLY ASPIRIN EC LOW DOSE PO, Take 81 mg by mouth daily, Disp: , Rfl:   •  ezetimibe (ZETIA) 10 mg tablet, Take 1 tablet (10 mg total) by mouth daily, Disp: 90 tablet, Rfl: 3  •  glucose blood test strip, Use to test sugars four times daily CONTOUR TEST STRIP, Disp: 100 each, Rfl: 5  •  hydrochlorothiazide (HYDRODIURIL) 12.5 mg tablet, Take 1 tablet (12.5 mg total) by mouth daily, Disp: 90 tablet, Rfl: 3  •  metFORMIN (GLUCOPHAGE) 500 mg tablet, 2 In the  Morning, 1 At Night, Disp: 270 tablet, Rfl: 3  •  Multiple Vitamins-Minerals (CENTRUM SILVER 50+MEN PO), Centrum Silver, Disp: , Rfl:   •  OneTouch Delica Lancets 28I MISC, Use to test sugars three times daily.  E11.9, Disp: 100 each, Rfl: 5  •  pioglitazone (ACTOS) 15 mg tablet, Take 1 tablet (15 mg total) by mouth daily, Disp: 90 tablet, Rfl: 3  •  potassium chloride (K-DUR,KLOR-CON) 20 mEq tablet, Take 1 tablet (20 mEq total) by mouth daily, Disp: 90 tablet, Rfl: 3  •  Probiotic Product (PROBIOTIC ADVANCED PO), Take by mouth, Disp: , Rfl:   •  rosuvastatin (CRESTOR) 5 mg tablet, Take 1 tablet (5 mg total) by mouth daily, Disp: 90 tablet, Rfl: 3  •  tamsulosin (FLOMAX) 0.4 mg, Take 1 capsule (0.4 mg total) by mouth 2 (two) times a day, Disp: 180 capsule, Rfl: 3  •  valsartan (DIOVAN) 160 mg tablet, Take 1 tablet (160 mg total) by mouth daily, Disp: 90 tablet, Rfl: 3    Current Allergies     Allergies as of 07/01/2023   • (No Known Allergies)            The following portions of the patient's history were reviewed and updated as appropriate: allergies, current medications, past family history, past medical history, past social history, past surgical history and problem list.     Past Medical History:   Diagnosis Date   • Carotid artery occlusion    • Disease of thyroid gland    • Hypertension    • Mixed hyperlipidemia    • Peripheral vascular disease ([address])    • Renal mass        Past Surgical History:   Procedure Laterality Date   • APPENDECTOMY     • CARPAL TUNNEL RELEASE     • CHOLECYSTECTOMY     • COLONOSCOPY  2014 12 polyps    • IR CRYOABLATION 10/20/2021   • JOINT REPLACEMENT Left     TKR   • JOINT REPLACEMENT Right     Total knee replacement    • SHOULDER SURGERY Left     repair        Family History   Problem Relation Age of Onset   • Breast cancer Mother    • Heart disease Father    • Stroke Father    • Breast cancer Sister    • Sudden death Brother         MVA   • Obesity Brother    • Diabetes Brother    • Obesity Brother          Medications have been verified. Objective   /71   Pulse 84   Temp 98.4 °F (36.9 °C) (Temporal)   Resp 18   Ht 5' 7" (1.702 m)   Wt 118 kg (260 lb)   SpO2 96%   BMI 40.72 kg/m²   No LMP for male patient. Physical Exam     Physical Exam  Constitutional:       General: He is not in acute distress. Appearance: Normal appearance. He is not diaphoretic. HENT:      Head: Normocephalic and atraumatic. Cardiovascular:      Rate and Rhythm: Normal rate and regular rhythm. Pulses: Normal pulses. Heart sounds: Normal heart sounds, S1 normal and S2 normal.   Pulmonary:      Effort: Pulmonary effort is normal.      Breath sounds: Normal breath sounds and air entry. Skin:     General: Skin is warm and dry. Capillary Refill: Capillary refill takes less than 2 seconds. Findings: Burn (3mm round blisters to lateral right 3rd and 4th toes. no bleeding, drainage, sweling or streaking.) present. Neurological:      Mental Status: He is alert.

## 2023-07-31 ENCOUNTER — OFFICE VISIT (OUTPATIENT)
Dept: URGENT CARE | Facility: CLINIC | Age: 73
End: 2023-07-31
Payer: MEDICARE

## 2023-07-31 VITALS
HEIGHT: 67 IN | WEIGHT: 260 LBS | RESPIRATION RATE: 18 BRPM | OXYGEN SATURATION: 97 % | TEMPERATURE: 97.2 F | HEART RATE: 69 BPM | BODY MASS INDEX: 40.81 KG/M2 | DIASTOLIC BLOOD PRESSURE: 64 MMHG | SYSTOLIC BLOOD PRESSURE: 143 MMHG

## 2023-07-31 DIAGNOSIS — T25.231D: ICD-10-CM

## 2023-07-31 DIAGNOSIS — L97.511 DIABETIC ULCER OF TOE OF RIGHT FOOT ASSOCIATED WITH DIABETES MELLITUS DUE TO UNDERLYING CONDITION, LIMITED TO BREAKDOWN OF SKIN (HCC): Primary | ICD-10-CM

## 2023-07-31 DIAGNOSIS — E08.621 DIABETIC ULCER OF TOE OF RIGHT FOOT ASSOCIATED WITH DIABETES MELLITUS DUE TO UNDERLYING CONDITION, LIMITED TO BREAKDOWN OF SKIN (HCC): Primary | ICD-10-CM

## 2023-07-31 PROCEDURE — G0463 HOSPITAL OUTPT CLINIC VISIT: HCPCS

## 2023-07-31 PROCEDURE — 99213 OFFICE O/P EST LOW 20 MIN: CPT

## 2023-07-31 NOTE — PROGRESS NOTES
[facility]        NAME: Martha King is a 67 y.o. 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 Diabetic ulcer of toe of right foot associated with diabetes mellitus due to underlying condition, limited to breakdown of skin [facility])  Ambulatory Referral to Wound Care    bacitracin 500 units/g topical ointment **ADS Override Pull**      2. Partial thickness burn of toe of right foot, subsequent encounter  Ambulatory Referral to Wound Care    bacitracin 500 units/g topical ointment **ADS Override Pull**            Patient Instructions     Continue the use of topical bacitracin  Referral to wound care  Follow up with PCP in 3-5 days. Proceed to  ER if symptoms worsen. Chief Complaint     Chief Complaint   Patient presents with   • Foot Burn     Patient wants burn to toes checked for infection. History of Present Illness       Patient is a 68 yo male with significant PMH of HTN and DM2 presenting in the clinic today for a burn of the right foot x 1 month. Patient was seen at [facility] urgent care and [facility] urgent care over the past month. Patient diagnosed with partial thickness burn of toe of right foot which has been treated with topical bacitracin. Admits slow healing wound located along the medial aspect of the right 4th toe and the lateral aspect of the right 3rd toe. Denies fever, chills, purulent drainage, numbness, tingling, erythema, swelling, warmth, bruising, chest pain, and SOB. Admits the use of topical bacitracin and betadine for symptom management. Patient notes he was seen by his podiatrist last week. Review of Systems   Review of Systems   Constitutional: Negative for chills and fever. Respiratory: Negative for shortness of breath.     Cardiovascular: Negative for chest pain. Skin: Positive for wound. Negative for rash. Current Medications       Current Outpatient Medications:   •  amLODIPine (NORVASC) 10 mg tablet, Take 1 tablet (10 mg total) by mouth daily, Disp: 90 tablet, Rfl: 3  •  NELLY ASPIRIN EC LOW DOSE PO, Take 81 mg by mouth daily, Disp: , Rfl:   •  ezetimibe (ZETIA) 10 mg tablet, Take 1 tablet (10 mg total) by mouth daily, Disp: 90 tablet, Rfl: 3  •  glucose blood test strip, Use to test sugars four times daily CONTOUR TEST STRIP, Disp: 100 each, Rfl: 5  •  hydrochlorothiazide (HYDRODIURIL) 12.5 mg tablet, Take 1 tablet (12.5 mg total) by mouth daily, Disp: 90 tablet, Rfl: 3  •  metFORMIN (GLUCOPHAGE) 500 mg tablet, 2 In the  Morning, 1 At Night, Disp: 270 tablet, Rfl: 3  •  Multiple Vitamins-Minerals (CENTRUM SILVER 50+MEN PO), Centrum Silver, Disp: , Rfl:   •  OneTouch Delica Lancets 84T MISC, Use to test sugars three times daily.  E11.9, Disp: 100 each, Rfl: 5  •  pioglitazone (ACTOS) 15 mg tablet, Take 1 tablet (15 mg total) by mouth daily, Disp: 90 tablet, Rfl: 3  •  potassium chloride (K-DUR,KLOR-CON) 20 mEq tablet, Take 1 tablet (20 mEq total) by mouth daily, Disp: 90 tablet, Rfl: 3  •  Probiotic Product (PROBIOTIC ADVANCED PO), Take by mouth, Disp: , Rfl:   •  rosuvastatin (CRESTOR) 5 mg tablet, Take 1 tablet (5 mg total) by mouth daily, Disp: 90 tablet, Rfl: 3  •  tamsulosin (FLOMAX) 0.4 mg, Take 1 capsule (0.4 mg total) by mouth 2 (two) times a day, Disp: 180 capsule, Rfl: 3  •  valsartan (DIOVAN) 160 mg tablet, Take 1 tablet (160 mg total) by mouth daily, Disp: 90 tablet, Rfl: 3    Current Allergies     Allergies as of 07/31/2023   • (No Known Allergies)            The following portions of the patient's history were reviewed and updated as appropriate: allergies, current medications, past family history, past medical history, past social history, past surgical history and problem list.     Past Medical History:   Diagnosis Date   • Carotid artery occlusion    • Disease of thyroid gland    • Hypertension    • Mixed hyperlipidemia    • Peripheral vascular disease ([address])    • Renal mass        Past Surgical History:   Procedure Laterality Date   • APPENDECTOMY     • CARPAL TUNNEL RELEASE     • CHOLECYSTECTOMY     • COLONOSCOPY  2014 12 polyps    • IR CRYOABLATION  10/20/2021   • JOINT REPLACEMENT Left     TKR   • JOINT REPLACEMENT Right     Total knee replacement    • SHOULDER SURGERY Left     repair        Family History   Problem Relation Age of Onset   • Breast cancer Mother    • Heart disease Father    • Stroke Father    • Breast cancer Sister    • Sudden death Brother         MVA   • Obesity Brother    • Diabetes Brother    • Obesity Brother          Medications have been verified. Objective   /64   Pulse 69   Temp (!) 97.2 °F (36.2 °C) (Temporal)   Resp 18   Ht 5' 7" (1.702 m)   Wt 118 kg (260 lb)   SpO2 97%   BMI 40.72 kg/m²        Physical Exam     Physical Exam  Vitals reviewed. Constitutional:       General: He is not in acute distress. Appearance: Normal appearance. He is normal weight. He is not ill-appearing. HENT:      Head: Normocephalic. Nose: Nose normal. No congestion or rhinorrhea. Mouth/Throat:      Mouth: Mucous membranes are moist.   Eyes:      General:         Right eye: No discharge. Left eye: No discharge. Conjunctiva/sclera: Conjunctivae normal.   Cardiovascular:      Rate and Rhythm: Normal rate and regular rhythm. Pulses: Normal pulses. Heart sounds: Normal heart sounds. No friction rub. No gallop. Pulmonary:      Effort: Pulmonary effort is normal.      Breath sounds: Normal breath sounds. No wheezing, rhonchi or rales. Musculoskeletal:      Cervical back: Normal range of motion and neck supple.    Feet:      Right foot:      Skin integrity: Ulcer (limited to breakdown of skin of medial aspect of 4th toe and lateral aspect of 3rd toe) and skin breakdown present. No erythema or warmth. Left foot:      Skin integrity: No ulcer, skin breakdown, erythema or warmth. Skin:     General: Skin is warm. Findings: Burn present. No rash. Neurological:      Mental Status: He is alert.    Psychiatric:         Mood and Affect: Mood normal.         Behavior: Behavior normal.

## 2023-07-31 NOTE — PATIENT INSTRUCTIONS
Continue the use of topical bacitracin  Referral to wound care  Follow up with PCP in 3-5 days. Proceed to  ER if symptoms worsen.

## 2023-08-03 ENCOUNTER — OFFICE VISIT (OUTPATIENT)
Dept: WOUND CARE | Facility: CLINIC | Age: 73
End: 2023-08-03
Payer: MEDICARE

## 2023-08-03 VITALS
BODY MASS INDEX: 45.8 KG/M2 | HEART RATE: 78 BPM | WEIGHT: 285 LBS | DIASTOLIC BLOOD PRESSURE: 69 MMHG | SYSTOLIC BLOOD PRESSURE: 122 MMHG | TEMPERATURE: 97.8 F | RESPIRATION RATE: 20 BRPM | HEIGHT: 66 IN

## 2023-08-03 DIAGNOSIS — T25.231A PARTIAL THICKNESS BURN OF TOE OF RIGHT FOOT, INITIAL ENCOUNTER: Primary | ICD-10-CM

## 2023-08-03 DIAGNOSIS — R80.9 TYPE 2 DIABETES MELLITUS WITH MICROALBUMINURIA, WITHOUT LONG-TERM CURRENT USE OF INSULIN (HCC): ICD-10-CM

## 2023-08-03 DIAGNOSIS — E11.29 TYPE 2 DIABETES MELLITUS WITH MICROALBUMINURIA, WITHOUT LONG-TERM CURRENT USE OF INSULIN (HCC): ICD-10-CM

## 2023-08-03 PROCEDURE — 99204 OFFICE O/P NEW MOD 45 MIN: CPT | Performed by: STUDENT IN AN ORGANIZED HEALTH CARE EDUCATION/TRAINING PROGRAM

## 2023-08-03 PROCEDURE — 99214 OFFICE O/P EST MOD 30 MIN: CPT | Performed by: STUDENT IN AN ORGANIZED HEALTH CARE EDUCATION/TRAINING PROGRAM

## 2023-08-03 PROCEDURE — 16020 DRESS/DEBRID P-THICK BURN S: CPT | Performed by: STUDENT IN AN ORGANIZED HEALTH CARE EDUCATION/TRAINING PROGRAM

## 2023-08-03 RX ORDER — LIDOCAINE 40 MG/G
CREAM TOPICAL ONCE
Status: COMPLETED | OUTPATIENT
Start: 2023-08-03 | End: 2023-08-03

## 2023-08-03 RX ADMIN — LIDOCAINE: 40 CREAM TOPICAL at 14:13

## 2023-08-03 NOTE — PATIENT INSTRUCTIONS
Orders Placed This Encounter   Procedures    Wound cleansing and dressings     3rd and 4th toe wounds     Wash your hands with soap and water. Remove old dressing, discard into plastic bag and place in trash. Cleanse the wound with unscented soap and water prior to applying a clean dressing. Do not use tissue or cotton balls. Do not scrub the wound. Pat dry using gauze. Shower yes - Do Not Soak in any water       Apply silver alginate  to the toe wounds. Cover with gauze   Secure with tape. Change dressing daily and as needed for drainage or displacement    Spandagrip F -- RLE lower leg   Elastic Tubular Stocking  Tubular elastic bandage: Apply from base of toes to behind the knee. Apply in AM, may remove for sleep. Avoid prolonged standing in one place. Elevate leg(s) above the level of the heart when sitting or as much as possible.      Limit your activity this week as much as possible     Increase Protein 3-4 servings per day in your diet this will aid in wound healing     Treatment completed today     Follow up in  1  Week     Standing Status:   Future     Standing Expiration Date:   8/3/2024
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
## 2023-08-09 ENCOUNTER — OFFICE VISIT (OUTPATIENT)
Dept: WOUND CARE | Facility: CLINIC | Age: 73
End: 2023-08-09
Payer: MEDICARE

## 2023-08-09 VITALS
HEART RATE: 68 BPM | DIASTOLIC BLOOD PRESSURE: 64 MMHG | TEMPERATURE: 97.3 F | SYSTOLIC BLOOD PRESSURE: 130 MMHG | RESPIRATION RATE: 18 BRPM

## 2023-08-09 DIAGNOSIS — R80.9 TYPE 2 DIABETES MELLITUS WITH MICROALBUMINURIA, WITHOUT LONG-TERM CURRENT USE OF INSULIN (HCC): ICD-10-CM

## 2023-08-09 DIAGNOSIS — E11.29 TYPE 2 DIABETES MELLITUS WITH MICROALBUMINURIA, WITHOUT LONG-TERM CURRENT USE OF INSULIN (HCC): ICD-10-CM

## 2023-08-09 DIAGNOSIS — T25.231A PARTIAL THICKNESS BURN OF TOE OF RIGHT FOOT, INITIAL ENCOUNTER: Primary | ICD-10-CM

## 2023-08-09 PROCEDURE — 16020 DRESS/DEBRID P-THICK BURN S: CPT | Performed by: STUDENT IN AN ORGANIZED HEALTH CARE EDUCATION/TRAINING PROGRAM

## 2023-08-09 RX ORDER — LIDOCAINE 40 MG/G
CREAM TOPICAL ONCE
Status: COMPLETED | OUTPATIENT
Start: 2023-08-09 | End: 2023-08-09

## 2023-08-09 RX ADMIN — LIDOCAINE 1 APPLICATION: 40 CREAM TOPICAL at 12:22
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
## 2023-08-09 NOTE — PATIENT INSTRUCTIONS
Orders Placed This Encounter   Procedures    Wound cleansing and dressings     3rd and 4th toe wounds      Wash your hands with soap and water. Remove old dressing, discard into plastic bag and place in trash. Cleanse the wound with unscented soap and water prior to applying a clean dressing. Do not use tissue or cotton balls. Do not scrub the wound. Pat dry using gauze. Shower yes - Do Not Soak in any water         Apply silver alginate  to the toe wounds. Cover with gauze   Secure with tape. Change dressing daily and as needed for drainage or displacement     Spandagrip F -- RLE lower leg   Elastic Tubular Stocking  Tubular elastic bandage: Apply from base of toes to behind the knee. Apply in AM, may remove for sleep. Avoid prolonged standing in one place. Elevate leg(s) above the level of the heart when sitting or as much as possible.       Limit your activity this week as much as possible      Increase Protein 3-4 servings per day in your diet this will aid in wound healing      Treatment completed today     Standing Status:   Future     Standing Expiration Date:   8/9/2024

## 2023-08-16 ENCOUNTER — OFFICE VISIT (OUTPATIENT)
Dept: WOUND CARE | Facility: CLINIC | Age: 73
End: 2023-08-16
Payer: MEDICARE

## 2023-08-16 VITALS
RESPIRATION RATE: 18 BRPM | TEMPERATURE: 96.5 F | DIASTOLIC BLOOD PRESSURE: 69 MMHG | SYSTOLIC BLOOD PRESSURE: 134 MMHG | HEART RATE: 70 BPM

## 2023-08-16 DIAGNOSIS — S80.811A ABRASION OF RIGHT LEG, INITIAL ENCOUNTER: ICD-10-CM

## 2023-08-16 DIAGNOSIS — E11.9 TYPE 2 DIABETES MELLITUS WITHOUT COMPLICATION, WITHOUT LONG-TERM CURRENT USE OF INSULIN (HCC): ICD-10-CM

## 2023-08-16 DIAGNOSIS — T25.231A PARTIAL THICKNESS BURN OF TOE OF RIGHT FOOT, INITIAL ENCOUNTER: Primary | ICD-10-CM

## 2023-08-16 DIAGNOSIS — R80.9 TYPE 2 DIABETES MELLITUS WITH MICROALBUMINURIA, WITHOUT LONG-TERM CURRENT USE OF INSULIN (HCC): ICD-10-CM

## 2023-08-16 DIAGNOSIS — E11.29 TYPE 2 DIABETES MELLITUS WITH MICROALBUMINURIA, WITHOUT LONG-TERM CURRENT USE OF INSULIN (HCC): ICD-10-CM

## 2023-08-16 PROCEDURE — 99213 OFFICE O/P EST LOW 20 MIN: CPT | Performed by: STUDENT IN AN ORGANIZED HEALTH CARE EDUCATION/TRAINING PROGRAM

## 2023-08-16 PROCEDURE — 99214 OFFICE O/P EST MOD 30 MIN: CPT | Performed by: STUDENT IN AN ORGANIZED HEALTH CARE EDUCATION/TRAINING PROGRAM

## 2023-08-16 RX ORDER — LIDOCAINE 40 MG/G
CREAM TOPICAL ONCE
Status: COMPLETED | OUTPATIENT
Start: 2023-08-16 | End: 2023-08-16

## 2023-08-16 RX ADMIN — LIDOCAINE: 40 CREAM TOPICAL at 10:44

## 2023-08-16 NOTE — TELEPHONE ENCOUNTER
Needs refill on his glucose blood test strip   Test 4 time daily  # 100  Refill 5    Pharmacy [facility] [place]
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
## 2023-08-31 DIAGNOSIS — R31.0 GROSS HEMATURIA: Primary | ICD-10-CM

## 2023-09-01 ENCOUNTER — APPOINTMENT (OUTPATIENT)
Dept: LAB | Facility: CLINIC | Age: 73
End: 2023-09-01
Payer: MEDICARE

## 2023-09-01 DIAGNOSIS — E78.2 MIXED HYPERLIPIDEMIA: ICD-10-CM

## 2023-09-01 DIAGNOSIS — R80.9 TYPE 2 DIABETES MELLITUS WITH MICROALBUMINURIA, WITHOUT LONG-TERM CURRENT USE OF INSULIN (HCC): ICD-10-CM

## 2023-09-01 DIAGNOSIS — E11.29 TYPE 2 DIABETES MELLITUS WITH MICROALBUMINURIA, WITHOUT LONG-TERM CURRENT USE OF INSULIN (HCC): ICD-10-CM

## 2023-09-01 DIAGNOSIS — R31.0 GROSS HEMATURIA: ICD-10-CM

## 2023-09-01 LAB
ANION GAP SERPL CALCULATED.3IONS-SCNC: 7 MMOL/L
BUN SERPL-MCNC: 17 MG/DL (ref 5–25)
CALCIUM SERPL-MCNC: 9.1 MG/DL (ref 8.4–10.2)
CHLORIDE SERPL-SCNC: 105 MMOL/L (ref 96–108)
CO2 SERPL-SCNC: 27 MMOL/L (ref 21–32)
CREAT SERPL-MCNC: 0.86 MG/DL (ref 0.6–1.3)
GFR SERPL CREATININE-BSD FRML MDRD: 86 ML/MIN/1.73SQ M
GLUCOSE SERPL-MCNC: 122 MG/DL (ref 65–140)
POTASSIUM SERPL-SCNC: 4 MMOL/L (ref 3.5–5.3)
SODIUM SERPL-SCNC: 139 MMOL/L (ref 135–147)

## 2023-09-01 PROCEDURE — 36415 COLL VENOUS BLD VENIPUNCTURE: CPT

## 2023-09-01 PROCEDURE — 80048 BASIC METABOLIC PNL TOTAL CA: CPT

## 2023-09-06 ENCOUNTER — HOSPITAL ENCOUNTER (OUTPATIENT)
Dept: CT IMAGING | Facility: HOSPITAL | Age: 73
Discharge: HOME | End: 2023-09-06
Payer: MEDICARE

## 2023-09-06 DIAGNOSIS — C64.9 RENAL CELL CARCINOMA, UNSPECIFIED LATERALITY (HCC): ICD-10-CM

## 2023-09-06 DIAGNOSIS — R91.1 PULMONARY NODULE: ICD-10-CM

## 2023-09-06 PROCEDURE — 71270 CT THORAX DX C-/C+: CPT

## 2023-09-06 PROCEDURE — G1004 CDSM NDSC: HCPCS

## 2023-09-06 PROCEDURE — 74170 CT ABD WO CNTRST FLWD CNTRST: CPT

## 2023-09-06 RX ADMIN — IOHEXOL 100 ML: 350 INJECTION, SOLUTION INTRAVENOUS at 08:22

## 2023-10-09 ENCOUNTER — OFFICE VISIT (OUTPATIENT)
Dept: UROLOGY | Facility: CLINIC | Age: 73
End: 2023-10-09
Payer: MEDICARE

## 2023-10-09 VITALS
DIASTOLIC BLOOD PRESSURE: 60 MMHG | TEMPERATURE: 98.2 F | OXYGEN SATURATION: 98 % | HEART RATE: 69 BPM | WEIGHT: 284 LBS | SYSTOLIC BLOOD PRESSURE: 130 MMHG | HEIGHT: 66 IN | BODY MASS INDEX: 45.64 KG/M2 | RESPIRATION RATE: 17 BRPM

## 2023-10-09 DIAGNOSIS — N28.89 BILATERAL RENAL MASSES: ICD-10-CM

## 2023-10-09 DIAGNOSIS — Z12.5 PROSTATE CANCER SCREENING: ICD-10-CM

## 2023-10-09 DIAGNOSIS — R91.1 PULMONARY NODULE: ICD-10-CM

## 2023-10-09 DIAGNOSIS — C64.9 RENAL CELL CARCINOMA, UNSPECIFIED LATERALITY (HCC): Primary | ICD-10-CM

## 2023-10-09 DIAGNOSIS — N39.41 URGE INCONTINENCE: ICD-10-CM

## 2023-10-09 PROCEDURE — 99213 OFFICE O/P EST LOW 20 MIN: CPT

## 2023-10-09 NOTE — PROGRESS NOTES
10/9/2023    Chief Complaint   Patient presents with   • Follow-up       Assessment and Plan    68 y.o. male manage by Dr. Chari Dancer    1. Right Renal cell carcinoma  · S/p cryoablation 10/20/2021   ·  Surveillance  CT Chest and Abdomen 9/06/2023  · Stable cryoablation zone in the right kidney  · No finding of metastatic disease in the abdomen  · Stable 4 mm and smaller pulmonary nodules. No new nodules  · Previously seen nodular opacity in the bronchus intermedius has resolved and was likely due to mucus. · Continue with yearly CT in 1 year. If stable, can consider Renal US and CXR annually. 2. Bilateral renal masses  · Surveillance CT Chest and Abdomen 9/06/2023  · Left lower pole 7 mm hyperdense lesion (series 4 image 91) is stable. No enhancement seen on MR 8/14/2021 in keeping with a proteinaceous cyst.  · Right lower pole hypodense lesion measuring 0.6 cm (series 4 image 89) is decreased in size from prior CT when it measured 1.1 cm. No enhancement seen on prior MR 8/14/2021 in keeping with a proteinaceous cyst.  · Repeat CT imaging in 1 year for continued surveillance    3. Lung nodule seen on imaging study  · CT Chest and Abdomen 9/06/2023  · No finding of metastatic disease in the abdomen  · Stable 4 mm and smaller pulmonary nodules. No new nodules  · Previously seen nodular opacity in the bronchus intermedius has resolved and was likely due to mucus. · Repeat CT in 1 year for continued annual surveillance     4. Urge incontinence  · Mostly experiences nocutria denies significant urge incontinence  · Recommend Flomax 0.8 mg HS  · Follow-up 1 year or sooner as needed    5. Prostate cancer screening  · 0.4 (4/14/2023)  · YELENA negative in March  · PSA due April 2024, will call with those results. Subjective:    Patient presents today with his wife reporting doing well overall since the last office visit. He denies any significant lower urinary tract symptoms.   He continues to experience nocturia 2-3 times per night but denies any significant urge incontinence. He takes Flomax 0.4 mg daily and was unaware of the increase in his dose to 0.4 mg twice daily. He denies any recurrent issues with gross hematuria and offers no complaints today. History of Present Illness  Harrison Sepulveda is a 68 y.o. male here for 6 month follow-up to reivew surveillance imaging. Patient Dr. Josh Calloway with a history of morbid obesity and diabetes and renal cystic disease. Patient had an MRI of the abdomen with and without contrast in 2019 at an outside institution which demonstrated right upper pole hemorrhagic cyst 1.2 cm.  Patient then had an ultrasound that demonstrated progression of this concern with a renal protocol CT with questionable hypoenhancement.  He underwent IR cryoablation and biopsy 10/20/2021 which revealed papillary renal cell neoplasm. During his initial postop visit on 11/11/2021,< 1 month post cryoablation he was 4 wheeling and developed gross hematuria. Ham Gibbs was advised not to ride his 4 ramirez for at least 2 weeks and then resume slowly. Ham Gibbs did not require evaluation/procedure by Interventional Radiology for this. He reported no recurrence of gross hematuria during his last office visit on 3/6/2023. During his last follow-up visit on 3/6/2023 with Stewart Renteria his surveillance CT chest and abdomen showed multiple findings requiring follow-up imaging. There was interval decrease size of the right upper pole renal neoplasm status post cryoablation. However there were subcentimeter heterogeneous lesions at the inferior pole of both kidneys, concerning for neoplasm. Additionally there was a 7 mm nodular density at the bronchus intermedius which may represent small amount of mucus or secretions. Was recommended 3-month follow-up imaging.       Repeat CT chest and abdomen from 9/6/2023 showed interval resolution of the previously seen nodular opacity in the bronchus intermedius which was likely due to mucous. There is no findings of metastatic disease in the abdomen. Stable cryoablation zone in the left kidney upper pole measuring 1.7 x 1.5 cm. Previously seen heterogeneous subcentimeter lesions at the inferior pole of both kidneys were stable in size and consistent with proteinaceous cyst.     He has bph and is on flomax for the same. Nocturia x1-2 , down from 4. He increased his water intake and decreased soda intake. Review of Systems   Constitutional: Negative for chills and fever. HENT: Negative for congestion and sore throat. Respiratory: Negative for cough and shortness of breath. Cardiovascular: Negative for chest pain and leg swelling. Gastrointestinal: Negative for abdominal pain, constipation and diarrhea. Genitourinary: Negative for difficulty urinating, dysuria, frequency, hematuria and urgency. Nocturia   Musculoskeletal: Negative for back pain and gait problem. Skin: Negative for wound. Allergic/Immunologic: Negative for immunocompromised state. Hematological: Does not bruise/bleed easily. Vitals  Vitals:    10/09/23 1425   BP: 130/60   Pulse: 69   Resp: 17   Temp: 98.2 °F (36.8 °C)   SpO2: 98%   Weight: 129 kg (284 lb)   Height: 5' 6" (1.676 m)       Physical Exam  Vitals reviewed. Constitutional:       General: He is not in acute distress. Appearance: Normal appearance. He is not ill-appearing or toxic-appearing. HENT:      Head: Normocephalic and atraumatic. Eyes:      General: No scleral icterus. Conjunctiva/sclera: Conjunctivae normal.   Cardiovascular:      Rate and Rhythm: Normal rate. Pulmonary:      Effort: Pulmonary effort is normal. No respiratory distress. Abdominal:      Tenderness: There is no right CVA tenderness or left CVA tenderness. Hernia: No hernia is present. Musculoskeletal:      Cervical back: Normal range of motion. Right lower leg: No edema. Left lower leg: No edema.    Skin: General: Skin is warm and dry. Coloration: Skin is not jaundiced or pale. Neurological:      General: No focal deficit present. Mental Status: He is alert and oriented to person, place, and time. Mental status is at baseline. Gait: Gait normal.   Psychiatric:         Mood and Affect: Mood normal.         Behavior: Behavior normal.         Thought Content: Thought content normal.         Judgment: Judgment normal.         Past History  Past Medical History:   Diagnosis Date   • Carotid artery occlusion    • Disease of thyroid gland    • Hypertension    • Mixed hyperlipidemia    • Peripheral vascular disease (HCC)    • Renal mass      Social History     Socioeconomic History   • Marital status: /Civil Union     Spouse name: None   • Number of children: None   • Years of education: None   • Highest education level: None   Occupational History   • Occupation: [employer] in steady days    Tobacco Use   • Smoking status: Never   • Smokeless tobacco: Never   • Tobacco comments:     Former smoker - As per [facility] Use   • Vaping Use: Never used   Substance and Sexual Activity   • Alcohol use: Yes     Comment: occasionally   • Drug use: Never   • Sexual activity: None   Other Topics Concern   • None   Social History Narrative    Consumes on average 3 cups of regular coffee per day      Social Determinants of Health     Financial Resource Strain: Low Risk  (10/24/2022)    Overall Financial Resource Strain (CARDIA)    • Difficulty of Paying Living Expenses: Not very hard   Food Insecurity: Not on file   Transportation Needs: No Transportation Needs (10/24/2022)    PRAPARE - Transportation    • Lack of Transportation (Medical): No    • Lack of Transportation (Non-Medical):  No   Physical Activity: Not on file   Stress: Not on file   Social Connections: Not on file   Intimate Partner Violence: Not on file   Housing Stability: Not on file     Social History     Tobacco Use   Smoking Status Never Smokeless Tobacco Never   Tobacco Comments    Former smoker - As per [employer]      Family History   Problem Relation Age of Onset   • Breast cancer Mother    • Heart disease Father    • Stroke Father    • Breast cancer Sister    • Sudden death Brother         MVA   • Obesity Brother    • Diabetes Brother    • Obesity Brother        The following portions of the patient's history were reviewed and updated as appropriate allergies, current medications, past medical history, past social history, past surgical history and problem list    [email or web address]  CT CHEST ABDOMEN WITHOUT AND WITH IV CONTRAST     INDICATION:   C64.9: Malignant neoplasm of unspecified kidney, except renal pelvis  R91.1: Solitary pulmonary nodule.     COMPARISON: CT chest and abdomen 2023     TECHNIQUE: CT examination of the chest, abdomen and pelvis was performed. Axial, sagittal, and coronal 2D reformatted images were created from the source data and submitted for interpretation.     Radiation dose length product (DLP) for this visit: . This examination, like all CT scans performed in the [facility], was performed utilizing techniques to minimize radiation dose exposure, including the use of iterative reconstruction   and automated exposure control.     IV Contrast:  100 mL of iohexol (OMNIPAQUE)  Enteric Contrast: Enteric contrast was not administered.     FINDINGS:     CHEST     LUNGS: Mild emphysema.     Right lower lobe subpleural 4 mm nodule (series 7 image 195), stable.     Right upper lobe 3 mm nodule (series 7 image 98), stable.     Left upper lobe 2 mm nodule (series 7 image 58).    Left lower lobe calcified granuloma.     Previously seen nodular opacity in the bronchus intermedius has resolved and was likely due to mucus.     PLEURA:  Unremarkable.     HEART/GREAT VESSELS: Marked coronary artery calcifications.  No thoracic aortic aneurysm.     MEDIASTINUM AND SUJIT:  Unremarkable.     CHEST WALL AND LOWER NECK:  Unremarkable.     ABDOMEN     LIVER/BILIARY TREE:  Unremarkable.     GALLBLADDER: Gallbladder is surgically absent.     SPLEEN:  Unremarkable.     PANCREAS:  Unremarkable.     ADRENAL GLANDS:  Unremarkable.     KIDNEYS/URETERS: Stable cryoablation zone in the left kidney upper pole measuring 1.7 x 1.5 cm (series 4 image 55).    Left lower pole 7 mm hyperdense lesion (series 4 image 91) is stable. No enhancement seen on MR 8/14/2021 in keeping with a proteinaceous cyst.     Right lower pole hypodense lesion measuring 0.6 cm (series 4 image 89) is decreased in size from prior CT when it measured 1.1 cm. No enhancement seen on prior MR 8/14/2021 in keeping with a proteinaceous cyst.     Bilateral simple-appearing renal sinus cysts. No hydronephrosis.     STOMACH AND VISUALIZED BOWEL:  Unremarkable.     ABDOMINAL CAVITY:  No ascites. No pneumoperitoneum. No lymphadenopathy.     VESSELS: Atherosclerotic changes are present. No evidence of aneurysm.     ABDOMINAL WALL:  Unremarkable.     OSSEOUS STRUCTURES: No acute fracture or destructive osseous lesion. Spinal degenerative changes are noted.     IMPRESSION:     1. Stable cryoablation zone in the left kidney upper pole measuring 1.7 x 1.5 cm.     2. No findings of metastatic disease in the abdomen.     3. Stable 4 mm and smaller pulmonary nodules. No new nodules.     4. Previously seen nodular opacity in the bronchus intermedius has resolved and was likely due to mucus. 2/23/2023  CT CHEST AND ABDOMEN WITH IV CONTRAST     INDICATION:   C64.9: Malignant neoplasm of unspecified kidney, except renal pelvis  R91.1: Solitary pulmonary nodule. History of right renal mass, papillary renal cell neoplasm status post cryoablation October 2021. Appendectomy. Cholecystectomy.     COMPARISON: CT chest 02/14/2022, 08/06/2021, 09/06/2019. CT abdomen 02/14/2022.  07/15/2021     TECHNIQUE:  CT examination of the chest and abdomen was performed.  Axial, sagittal, and coronal 2D reformatted images were created from the source data and submitted for interpretation.     Radiation dose length product (DLP) for this visit:  2482. 36 mGy-cm . This examination, like all CT scans performed in the [facility], was performed utilizing techniques to minimize radiation dose exposure, including the use of   iterative reconstruction and automated exposure control.     IV Contrast:  100 mL of iohexol (OMNIPAQUE)  Enteric Contrast:  Enteric contrast was not administered.     FINDINGS:     CHEST     LUNGS:  Approximate 7 mm nodular density at the bronchus intermedius bifurcation may represent a small amount of mucus or secretions. 3/143.     Biapical blebs, larger on the left. 3 mm right upper lobe nodule 3/102, stable. 4 x 5 mm juxtapleural right lower lobe nodule 3/198, stable. 2 mm left upper lobe nodule 3/120, stable.     PLEURA:  Unremarkable.     HEART/GREAT VESSELS: Atherosclerotic coronary artery calcification . Heart is otherwise unremarkable. No thoracic aortic aneurysm.     MEDIASTINUM AND SUJIT:  Subcentimeter mediastinal lymph nodes. No lymphadenopathy.     CHEST WALL AND LOWER NECK:  Unremarkable.     ABDOMEN     LIVER/BILIARY TREE:  Unremarkable.     GALLBLADDER:  Gallbladder is surgically absent.     SPLEEN:  Unremarkable.     PANCREAS:  Unremarkable.     ADRENAL GLANDS:  Unremarkable.     KIDNEYS/URETERS:  16 x 20 x 14  mm low-attenuation area at the superior pole of the right kidney with surrounding stranding, previously measuring 26 x 21 x 20 mm. There is a surrounding halo of fat. A noncontrasted sequence was not obtained.       At the inferior lateral margin of the right kidney is a partially exophytic approximate 9 mm round heterogeneously enhancing lesion, in retrospect this previously measured approximately 7 x 5 mm.   Heterogeneous partially exophytic lesion at the inferior   pole of the left kidney measuring 9 x 7 mm axially in retrospect this previously measured 6 x 5 mm. Bilateral parapelvic cysts. No hydronephrosis.       VISUALIZED BOWEL:  Unremarkable.     VISUALIZED ABDOMINAL CAVITY:  No ascites or free air. No fluid collection.     VESSELS:  Atherosclerotic calcification of the aorta and its major branches. No abdominal aortic aneurysm.        OSSEOUS STRUCTURES:  No acute fracture or destructive osseous lesion. Degenerative changes. Left humeral anchor.     IMPRESSION:     Interval decreased size of right upper pole renal neoplasm status post cryoablation.     Subcentimeter heterogeneous lesions at the inferior pole of both kidneys, concerning for neoplasm.     7 mm nodular density at the bronchus intermedius may represent small amounts of mucus or secretions. Recommend 3 month follow-up.     Additional findings as above. Results  No results found for this or any previous visit (from the past 1 hour(s)).]  Lab Results   Component Value Date    PSA 0.4 04/14/2023    PSA 0.5 03/31/2022    PSA 0.4 03/16/2021    PSA 0.5 02/24/2020     Lab Results   Component Value Date    CALCIUM 9.1 09/01/2023     05/02/2018    K 4.0 09/01/2023    CO2 27 09/01/2023     09/01/2023    BUN 17 09/01/2023    CREATININE 0.86 09/01/2023     Lab Results   Component Value Date    WBC 8.01 04/14/2023    HGB 13.8 04/14/2023    HCT 42.8 04/14/2023    MCV 92 04/14/2023     04/14/2023       Please Note:  Voice dictation software has been used to create this document. There may be inadvertent transcriptions errors.      BRANDI Pike 10/09/23

## 2023-10-19 DIAGNOSIS — E11.9 TYPE 2 DIABETES MELLITUS WITHOUT COMPLICATION, WITHOUT LONG-TERM CURRENT USE OF INSULIN (HCC): ICD-10-CM

## 2023-10-25 DIAGNOSIS — I10 ESSENTIAL HYPERTENSION: ICD-10-CM

## 2023-10-25 RX ORDER — POTASSIUM CHLORIDE 1500 MG/1
20 TABLET, EXTENDED RELEASE ORAL DAILY
Qty: 90 TABLET | Refills: 3 | Status: SHIPPED | OUTPATIENT
Start: 2023-10-25

## 2023-11-02 ENCOUNTER — OFFICE VISIT (OUTPATIENT)
Dept: FAMILY MEDICINE CLINIC | Facility: CLINIC | Age: 73
End: 2023-11-02

## 2023-11-02 ENCOUNTER — TELEPHONE (OUTPATIENT)
Dept: ADMINISTRATIVE | Facility: OTHER | Age: 73
End: 2023-11-02

## 2023-11-02 VITALS
OXYGEN SATURATION: 98 % | RESPIRATION RATE: 18 BRPM | WEIGHT: 282.4 LBS | HEIGHT: 66 IN | SYSTOLIC BLOOD PRESSURE: 130 MMHG | BODY MASS INDEX: 45.38 KG/M2 | DIASTOLIC BLOOD PRESSURE: 64 MMHG | TEMPERATURE: 97 F | HEART RATE: 70 BPM

## 2023-11-02 DIAGNOSIS — E78.2 MIXED HYPERLIPIDEMIA: ICD-10-CM

## 2023-11-02 DIAGNOSIS — Z00.00 MEDICARE ANNUAL WELLNESS VISIT, SUBSEQUENT: Primary | ICD-10-CM

## 2023-11-02 DIAGNOSIS — R80.9 TYPE 2 DIABETES MELLITUS WITH MICROALBUMINURIA, WITHOUT LONG-TERM CURRENT USE OF INSULIN: ICD-10-CM

## 2023-11-02 DIAGNOSIS — I10 ESSENTIAL HYPERTENSION: ICD-10-CM

## 2023-11-02 DIAGNOSIS — E11.29 TYPE 2 DIABETES MELLITUS WITH MICROALBUMINURIA, WITHOUT LONG-TERM CURRENT USE OF INSULIN: ICD-10-CM

## 2023-11-02 LAB — SL AMB POCT HEMOGLOBIN AIC: 6.1 (ref ?–6.5)

## 2023-11-02 NOTE — TELEPHONE ENCOUNTER
Upon review of the In Basket request we were able to locate, review, and update the patient chart as requested for Diabetic Eye Exam.    Any additional questions or concerns should be emailed to the Practice Liaisons via the appropriate education email address, please do not reply via In Basket.     Thank you  Redgie Ends

## 2023-11-02 NOTE — PATIENT INSTRUCTIONS
Labs tomorrow- fasting   Continue medications, as directed   Lab work prior to next visit   Return with issues/concerns

## 2023-11-02 NOTE — PROGRESS NOTES
Assessment and Plan:     Problem List Items Addressed This Visit          Endocrine    Type 2 diabetes mellitus with microalbuminuria, without long-term current use of insulin     Relevant Orders    POCT hemoglobin A1c (Completed)    Hemoglobin A1C    Comprehensive metabolic panel    CBC and differential    Albumin / creatinine urine ratio    Lipid Panel with Direct LDL reflex       Cardiovascular and Mediastinum    Essential hypertension       Other    Mixed hyperlipidemia    Relevant Orders    Lipid Panel with Direct LDL reflex     Other Visit Diagnoses       Medicare annual wellness visit, subsequent    -  Primary    Relevant Orders    Comprehensive metabolic panel    CBC and differential          BMI Counseling: Body mass index is 45.58 kg/m². The BMI is above normal. Nutrition recommendations include decreasing portion sizes and decreasing fast food intake. Exercise recommendations include moderate physical activity 150 minutes/week. Rationale for BMI follow-up plan is due to patient being overweight or obese. Depression Screening and Follow-up Plan: Patient was screened for depression during today's encounter. They screened negative with a PHQ-2 score of 0. Preventive health issues were discussed with patient, and age appropriate screening tests were ordered as noted in patient's After Visit Summary. Personalized health advice and appropriate referrals for health education or preventive services given if needed, as noted in patient's After Visit Summary. History of Present Illness:     Patient presents for a Medicare Wellness Visit    Patient is a 67 y/o male, presenting for medicare well visit. - Did not get lab work since April. Patient unaware and agreeable to go tomorrow morning, while fasting. A1C in the office is 6.1.     - Medications reviewed.  No refills needed            Patient Care Team:  Bethanie Stack as PCP - General (Family Medicine)  Aide Lyman as Nurse Practitioner (Urology)  Mervat Gallegos PA-C as Wound Care (Physician Assistant)  Grabiel Escobar as Nurse Practitioner (Nurse Practitioner)     Review of Systems:     Review of Systems   Constitutional:  Negative for activity change, diaphoresis, fatigue and fever. HENT:  Negative for congestion, facial swelling, hearing loss, rhinorrhea, sinus pressure, sinus pain, sneezing, sore throat and voice change. Eyes:  Negative for discharge and visual disturbance. Respiratory:  Negative for cough, choking, chest tightness, shortness of breath, wheezing and stridor. Cardiovascular:  Negative for chest pain, palpitations and leg swelling. Gastrointestinal:  Negative for abdominal distention, abdominal pain, constipation, diarrhea, nausea and vomiting. Endocrine: Negative for polydipsia, polyphagia and polyuria. Genitourinary:  Negative for difficulty urinating, dysuria, frequency and urgency. Musculoskeletal:  Negative for arthralgias, back pain, gait problem, joint swelling, myalgias, neck pain and neck stiffness. Skin:  Negative for color change, rash and wound. Neurological:  Negative for dizziness, syncope, speech difficulty, weakness, light-headedness and headaches. Hematological:  Negative for adenopathy. Does not bruise/bleed easily. Psychiatric/Behavioral:  Negative for agitation, behavioral problems, confusion, hallucinations, sleep disturbance and suicidal ideas. The patient is not nervous/anxious. All other systems reviewed and are negative.        Problem List:     Patient Active Problem List   Diagnosis    Encounter for diabetic foot exam ([address])    Morbid obesity with BMI of 40.0-44.9, adult ([address])    Essential hypertension    Lung nodule seen on imaging study    Mixed hyperlipidemia    Bilateral renal masses    Edema    Hyperaldosteronism (HCC)    Type 2 diabetes mellitus with microalbuminuria, without long-term current use of insulin     Gross hematuria    Renal cell carcinoma ([address])    Benign prostatic hyperplasia with nocturia    Heart murmur    Urge incontinence      Past Medical and Surgical History:     Past Medical History:   Diagnosis Date    Carotid artery occlusion     Disease of thyroid gland     Hypertension     Mixed hyperlipidemia     Peripheral vascular disease ([address])     Renal mass      Past Surgical History:   Procedure Laterality Date    APPENDECTOMY      CARPAL TUNNEL RELEASE      CHOLECYSTECTOMY      COLONOSCOPY  2014    12 polyps     IR CRYOABLATION  10/20/2021    JOINT REPLACEMENT Left     TKR    JOINT REPLACEMENT Right     Total knee replacement     SHOULDER SURGERY Left     repair       Family History:     Family History   Problem Relation Age of Onset    Breast cancer Mother     Heart disease Father     Stroke Father     Breast cancer Sister     Sudden death Brother         MVA    Obesity Brother     Diabetes Brother     Obesity Brother       Social History:     Social History     Socioeconomic History    Marital status: /Civil Union     Spouse name: None    Number of children: None    Years of education: None    Highest education level: None   Occupational History    Occupation: [employer] in [employer] days    Tobacco Use    Smoking status: Never    Smokeless tobacco: Never    Tobacco comments:     Former smoker - As per [facility] Use    Vaping Use: Never used   Substance and Sexual Activity    Alcohol use: Yes     Comment: occasionally    Drug use: Never    Sexual activity: None   Other Topics Concern    None   Social History Narrative    Consumes on average 3 cups of regular coffee per day      Social Determinants of Health     Financial Resource Strain: Low Risk  (11/2/2023)    Overall Financial Resource Strain (CARDIA)     Difficulty of Paying Living Expenses: Not very hard   Food Insecurity: Not on file   Transportation Needs: No Transportation Needs (11/2/2023)    PRAPARE - Transportation     Lack of Transportation (Medical): No     Lack of Transportation (Non-Medical): No   Physical Activity: Not on file   Stress: Not on file   Social Connections: Not on file   Intimate Partner Violence: Not on file   Housing Stability: Not on file      Medications and Allergies:     Current Outpatient Medications   Medication Sig Dispense Refill    amLODIPine (NORVASC) 10 mg tablet Take 1 tablet (10 mg total) by mouth daily 90 tablet 3    NELLY ASPIRIN EC LOW DOSE PO Take 81 mg by mouth daily      ezetimibe (ZETIA) 10 mg tablet Take 1 tablet (10 mg total) by mouth daily 90 tablet 3    glucose blood test strip Use to test sugars four times daily CONTOUR TEST STRIP 100 each 5    hydrochlorothiazide (HYDRODIURIL) 12.5 mg tablet Take 1 tablet (12.5 mg total) by mouth daily 90 tablet 3    Klor-Con M20 20 MEQ tablet TAKE 1 TABLET DAILY 90 tablet 3    metFORMIN (GLUCOPHAGE) 500 mg tablet TAKE 2 TABLETS IN THE MORNING AND 1 TABLET AT NIGHT 270 tablet 3    Multiple Vitamins-Minerals (CENTRUM SILVER 50+MEN PO) Centrum Silver      OneTouch Delica Lancets 51F MISC Use to test sugars three times daily. E11.9 100 each 5    pioglitazone (ACTOS) 15 mg tablet Take 1 tablet (15 mg total) by mouth daily 90 tablet 3    Probiotic Product (PROBIOTIC ADVANCED PO) Take by mouth      rosuvastatin (CRESTOR) 5 mg tablet Take 1 tablet (5 mg total) by mouth daily 90 tablet 3    tamsulosin (FLOMAX) 0.4 mg Take 1 capsule (0.4 mg total) by mouth 2 (two) times a day 180 capsule 3    valsartan (DIOVAN) 160 mg tablet Take 1 tablet (160 mg total) by mouth daily 90 tablet 3     No current facility-administered medications for this visit.      No Known Allergies   Immunizations:     Immunization History   Administered Date(s) Administered    COVID-19 MODERNA VACC 0.5 ML IM 03/16/2021, 04/11/2021, 11/02/2021    INFLUENZA 10/20/2016, 10/11/2017, 09/19/2018, 10/24/2022    Influenza, high dose seasonal 0.7 mL 09/24/2019, 09/25/2020, 10/18/2021, 10/24/2022    Pneumococcal Conjugate 13-Valent 10/20/2016 Pneumococcal Polysaccharide PPV23 03/12/2018    Tdap 10/26/2014, 09/06/2019    Zoster 11/10/2014    Zoster Vaccine Recombinant 01/01/2020      Health Maintenance:         Topic Date Due    Colorectal Cancer Screening  05/14/2024    Hepatitis C Screening  Completed         Topic Date Due    COVID-19 Vaccine (4 - Moderna series) 12/28/2021    Influenza Vaccine (1) 09/01/2023      Medicare Screening Tests and Risk Assessments:     Jazmin Portillo is here for his Subsequent Wellness visit. Health Risk Assessment:   Patient rates overall health as good. Patient feels that their physical health rating is same. Patient is satisfied with their life. Eyesight was rated as same. Hearing was rated as same. Patient feels that their emotional and mental health rating is same. Patients states they are never, rarely angry. Patient states they are never, rarely unusually tired/fatigued. Pain experienced in the last 7 days has been none. Patient states that he has experienced no weight loss or gain in last 6 months. Depression Screening:   PHQ-2 Score: 0      Fall Risk Screening: In the past year, patient has experienced: no history of falling in past year      Home Safety:  Patient does not have trouble with stairs inside or outside of their home. Patient has working smoke alarms and has working carbon monoxide detector. Home safety hazards include: none. Nutrition:   Current diet is Unhealthy. Medications:   Patient is not currently taking any over-the-counter supplements. Patient is able to manage medications. Activities of Daily Living (ADLs)/Instrumental Activities of Daily Living (IADLs):   Walk and transfer into and out of bed and chair?: Yes  Dress and groom yourself?: Yes    Bathe or shower yourself?: Yes    Feed yourself?  Yes  Do your laundry/housekeeping?: Yes  Manage your money, pay your bills and track your expenses?: Yes  Make your own meals?: Yes    Do your own shopping?: Yes    Previous Hospitalizations: Any hospitalizations or ED visits within the last 12 months?: No      Advance Care Planning:   Living will: No    Durable POA for healthcare: No    Advanced directive: No      PREVENTIVE SCREENINGS      Cardiovascular Screening:    General: Screening Not Indicated and History Lipid Disorder    Due for: Lipid Panel      Diabetes Screening:     General: Screening Not Indicated and History Diabetes    Due for: Blood Glucose      Colorectal Cancer Screening:     General: Screening Current      Prostate Cancer Screening:    General: Screening Current      Abdominal Aortic Aneurysm (AAA) Screening:    Risk factors include: age between 70-75 yo        Lung Cancer Screening:     General: Screening Not Indicated      Hepatitis C Screening:    General: Screening Current    Screening, Brief Intervention, and Referral to Treatment (SBIRT)    Screening  Typical number of drinks in a day: 0  Typical number of drinks in a week: 1  Interpretation: Low risk drinking behavior. Single Item Drug Screening:  How often have you used an illegal drug (including marijuana) or a prescription medication for non-medical reasons in the past year? never    Single Item Drug Screen Score: 0  Interpretation: Negative screen for possible drug use disorder    No results found. Physical Exam:     /64   Pulse 70   Temp (!) 97 °F (36.1 °C) (Tympanic)   Resp 18   Ht 5' 6" (1.676 m)   Wt 128 kg (282 lb 6.4 oz)   SpO2 98%   BMI 45.58 kg/m²     Physical Exam  Vitals and nursing note reviewed. Exam conducted with a chaperone present (wife- Tashi Sanders). Constitutional:       General: He is not in acute distress. Appearance: He is well-developed. He is not diaphoretic. HENT:      Head: Normocephalic and atraumatic. Right Ear: External ear normal.      Left Ear: External ear normal.      Nose: Nose normal.      Mouth/Throat:      Pharynx: No oropharyngeal exudate. Eyes:      General:         Right eye: No discharge.          Left eye: No discharge. Conjunctiva/sclera: Conjunctivae normal.   Neck:      Thyroid: No thyromegaly. Trachea: No tracheal deviation. Cardiovascular:      Rate and Rhythm: Normal rate and regular rhythm. Heart sounds: Murmur heard. Pulmonary:      Effort: Pulmonary effort is normal. No respiratory distress. Breath sounds: Normal breath sounds. No wheezing. Musculoskeletal:         General: No tenderness or deformity. Normal range of motion. Cervical back: Normal range of motion and neck supple. Skin:     General: Skin is warm and dry. Coloration: Skin is not pale. Findings: No erythema or rash. Neurological:      Mental Status: He is alert and oriented to person, place, and time. Coordination: Coordination normal.   Psychiatric:         Mood and Affect: Mood normal.         Behavior: Behavior normal.         Thought Content:  Thought content normal.         Judgment: Judgment normal.          BRANDI Wills

## 2023-11-02 NOTE — TELEPHONE ENCOUNTER
----- Message from Juan C Chandler sent at 11/2/2023 11:57 AM EDT -----  11/02/23 11:57 AM    Hello, our patient James Stephenson has had Diabetic Eye Exam completed/performed. Please assist in updating the patient chart by pulling a previous Electronic Medical Record (EMR) document. The date of service is within the last year.     Thank you,  Leila Rene  [facility] [address]

## 2023-11-03 ENCOUNTER — APPOINTMENT (OUTPATIENT)
Dept: LAB | Facility: CLINIC | Age: 73
End: 2023-11-03
Payer: MEDICARE

## 2023-11-03 DIAGNOSIS — Z00.00 MEDICARE ANNUAL WELLNESS VISIT, SUBSEQUENT: ICD-10-CM

## 2023-11-03 DIAGNOSIS — E78.2 MIXED HYPERLIPIDEMIA: ICD-10-CM

## 2023-11-03 DIAGNOSIS — E11.29 TYPE 2 DIABETES MELLITUS WITH MICROALBUMINURIA, WITHOUT LONG-TERM CURRENT USE OF INSULIN: ICD-10-CM

## 2023-11-03 DIAGNOSIS — R80.9 TYPE 2 DIABETES MELLITUS WITH MICROALBUMINURIA, WITHOUT LONG-TERM CURRENT USE OF INSULIN: ICD-10-CM

## 2023-11-03 LAB
ALBUMIN SERPL BCP-MCNC: 4.3 G/DL (ref 3.5–5)
ALP SERPL-CCNC: 59 U/L (ref 34–104)
ALT SERPL W P-5'-P-CCNC: 29 U/L (ref 7–52)
ANION GAP SERPL CALCULATED.3IONS-SCNC: 7 MMOL/L
AST SERPL W P-5'-P-CCNC: 23 U/L (ref 13–39)
BASOPHILS # BLD AUTO: 0.05 THOUSANDS/ÂΜL (ref 0–0.1)
BASOPHILS NFR BLD AUTO: 1 % (ref 0–1)
BILIRUB SERPL-MCNC: 0.54 MG/DL (ref 0.2–1)
BUN SERPL-MCNC: 17 MG/DL (ref 5–25)
CALCIUM SERPL-MCNC: 9.4 MG/DL (ref 8.4–10.2)
CHLORIDE SERPL-SCNC: 105 MMOL/L (ref 96–108)
CHOLEST SERPL-MCNC: 110 MG/DL
CO2 SERPL-SCNC: 26 MMOL/L (ref 21–32)
CREAT SERPL-MCNC: 0.89 MG/DL (ref 0.6–1.3)
CREAT UR-MCNC: 218.7 MG/DL
EOSINOPHIL # BLD AUTO: 0.27 THOUSAND/ÂΜL (ref 0–0.61)
EOSINOPHIL NFR BLD AUTO: 4 % (ref 0–6)
ERYTHROCYTE [DISTWIDTH] IN BLOOD BY AUTOMATED COUNT: 13.1 % (ref 11.6–15.1)
EST. AVERAGE GLUCOSE BLD GHB EST-MCNC: 143 MG/DL
GFR SERPL CREATININE-BSD FRML MDRD: 84 ML/MIN/1.73SQ M
GLUCOSE P FAST SERPL-MCNC: 136 MG/DL (ref 65–99)
HBA1C MFR BLD: 6.6 %
HCT VFR BLD AUTO: 42.1 % (ref 36.5–49.3)
HDLC SERPL-MCNC: 41 MG/DL
HGB BLD-MCNC: 14.2 G/DL (ref 12–17)
IMM GRANULOCYTES # BLD AUTO: 0.02 THOUSAND/UL (ref 0–0.2)
IMM GRANULOCYTES NFR BLD AUTO: 0 % (ref 0–2)
LDLC SERPL CALC-MCNC: 45 MG/DL (ref 0–100)
LYMPHOCYTES # BLD AUTO: 1.51 THOUSANDS/ÂΜL (ref 0.6–4.47)
LYMPHOCYTES NFR BLD AUTO: 20 % (ref 14–44)
MCH RBC QN AUTO: 30.7 PG (ref 26.8–34.3)
MCHC RBC AUTO-ENTMCNC: 33.7 G/DL (ref 31.4–37.4)
MCV RBC AUTO: 91 FL (ref 82–98)
MICROALBUMIN UR-MCNC: 309.8 MG/L
MICROALBUMIN/CREAT 24H UR: 142 MG/G CREATININE (ref 0–30)
MONOCYTES # BLD AUTO: 0.75 THOUSAND/ÂΜL (ref 0.17–1.22)
MONOCYTES NFR BLD AUTO: 10 % (ref 4–12)
NEUTROPHILS # BLD AUTO: 4.99 THOUSANDS/ÂΜL (ref 1.85–7.62)
NEUTS SEG NFR BLD AUTO: 65 % (ref 43–75)
NRBC BLD AUTO-RTO: 0 /100 WBCS
PLATELET # BLD AUTO: 223 THOUSANDS/UL (ref 149–390)
PMV BLD AUTO: 10.8 FL (ref 8.9–12.7)
POTASSIUM SERPL-SCNC: 4.1 MMOL/L (ref 3.5–5.3)
PROT SERPL-MCNC: 7.3 G/DL (ref 6.4–8.4)
RBC # BLD AUTO: 4.63 MILLION/UL (ref 3.88–5.62)
SODIUM SERPL-SCNC: 138 MMOL/L (ref 135–147)
TRIGL SERPL-MCNC: 120 MG/DL
WBC # BLD AUTO: 7.59 THOUSAND/UL (ref 4.31–10.16)

## 2023-11-29 DIAGNOSIS — R80.9 TYPE 2 DIABETES MELLITUS WITH MICROALBUMINURIA, WITHOUT LONG-TERM CURRENT USE OF INSULIN: ICD-10-CM

## 2023-11-29 DIAGNOSIS — E11.29 TYPE 2 DIABETES MELLITUS WITH MICROALBUMINURIA, WITHOUT LONG-TERM CURRENT USE OF INSULIN: ICD-10-CM

## 2023-11-29 DIAGNOSIS — I10 ESSENTIAL HYPERTENSION: ICD-10-CM

## 2023-11-29 DIAGNOSIS — E78.2 MIXED HYPERLIPIDEMIA: ICD-10-CM

## 2023-11-29 RX ORDER — AMLODIPINE BESYLATE 10 MG/1
10 TABLET ORAL DAILY
Qty: 90 TABLET | Refills: 3 | Status: SHIPPED | OUTPATIENT
Start: 2023-11-29

## 2023-11-29 RX ORDER — HYDROCHLOROTHIAZIDE 12.5 MG/1
12.5 TABLET ORAL DAILY
Qty: 90 TABLET | Refills: 3 | Status: SHIPPED | OUTPATIENT
Start: 2023-11-29

## 2023-11-29 RX ORDER — VALSARTAN 160 MG/1
160 TABLET ORAL DAILY
Qty: 90 TABLET | Refills: 3 | Status: SHIPPED | OUTPATIENT
Start: 2023-11-29

## 2023-11-29 RX ORDER — ROSUVASTATIN CALCIUM 5 MG/1
5 TABLET, COATED ORAL DAILY
Qty: 90 TABLET | Refills: 3 | Status: SHIPPED | OUTPATIENT
Start: 2023-11-29

## 2023-11-29 RX ORDER — PIOGLITAZONEHYDROCHLORIDE 15 MG/1
15 TABLET ORAL DAILY
Qty: 90 TABLET | Refills: 3 | Status: SHIPPED | OUTPATIENT
Start: 2023-11-29

## 2023-12-11 DIAGNOSIS — E78.2 MIXED HYPERLIPIDEMIA: ICD-10-CM

## 2023-12-11 RX ORDER — EZETIMIBE 10 MG/1
10 TABLET ORAL DAILY
Qty: 90 TABLET | Refills: 3 | Status: SHIPPED | OUTPATIENT
Start: 2023-12-11

## 2024-02-02 DIAGNOSIS — R80.9 TYPE 2 DIABETES MELLITUS WITH MICROALBUMINURIA, WITHOUT LONG-TERM CURRENT USE OF INSULIN: ICD-10-CM

## 2024-02-02 DIAGNOSIS — Z12.5 SCREENING PSA (PROSTATE SPECIFIC ANTIGEN): ICD-10-CM

## 2024-02-02 DIAGNOSIS — E78.2 MIXED HYPERLIPIDEMIA: Primary | ICD-10-CM

## 2024-02-02 DIAGNOSIS — E11.29 TYPE 2 DIABETES MELLITUS WITH MICROALBUMINURIA, WITHOUT LONG-TERM CURRENT USE OF INSULIN: ICD-10-CM

## 2024-02-12 DIAGNOSIS — R35.1 BENIGN PROSTATIC HYPERPLASIA WITH NOCTURIA: ICD-10-CM

## 2024-02-12 DIAGNOSIS — N40.1 BENIGN PROSTATIC HYPERPLASIA WITH NOCTURIA: ICD-10-CM

## 2024-02-12 DIAGNOSIS — N39.41 URGE INCONTINENCE: ICD-10-CM

## 2024-02-12 RX ORDER — TAMSULOSIN HYDROCHLORIDE 0.4 MG/1
0.4 CAPSULE ORAL 2 TIMES DAILY
Qty: 180 CAPSULE | Refills: 1 | Status: SHIPPED | OUTPATIENT
Start: 2024-02-12

## 2024-03-01 ENCOUNTER — OFFICE VISIT (OUTPATIENT)
Dept: WOUND CARE | Facility: CLINIC | Age: 74
End: 2024-03-01
Payer: MEDICARE

## 2024-03-01 VITALS
RESPIRATION RATE: 18 BRPM | WEIGHT: 280 LBS | TEMPERATURE: 97.5 F | DIASTOLIC BLOOD PRESSURE: 72 MMHG | HEART RATE: 80 BPM | HEIGHT: 66 IN | BODY MASS INDEX: 45 KG/M2 | SYSTOLIC BLOOD PRESSURE: 140 MMHG

## 2024-03-01 DIAGNOSIS — S90.415A: Primary | ICD-10-CM

## 2024-03-01 DIAGNOSIS — R80.9 TYPE 2 DIABETES MELLITUS WITH MICROALBUMINURIA, WITHOUT LONG-TERM CURRENT USE OF INSULIN: ICD-10-CM

## 2024-03-01 DIAGNOSIS — E11.29 TYPE 2 DIABETES MELLITUS WITH MICROALBUMINURIA, WITHOUT LONG-TERM CURRENT USE OF INSULIN: ICD-10-CM

## 2024-03-01 PROCEDURE — 99213 OFFICE O/P EST LOW 20 MIN: CPT | Performed by: STUDENT IN AN ORGANIZED HEALTH CARE EDUCATION/TRAINING PROGRAM

## 2024-03-01 PROCEDURE — 97597 DBRDMT OPN WND 1ST 20 CM/<: CPT | Performed by: STUDENT IN AN ORGANIZED HEALTH CARE EDUCATION/TRAINING PROGRAM

## 2024-03-01 RX ORDER — LIDOCAINE 40 MG/G
CREAM TOPICAL ONCE
Status: COMPLETED | OUTPATIENT
Start: 2024-03-01 | End: 2024-03-01

## 2024-03-01 RX ADMIN — LIDOCAINE: 40 CREAM TOPICAL at 08:57

## 2024-03-01 NOTE — PROGRESS NOTES
Patient ID: Hay Dela Cruz is a 73 y.o. male Date of Birth 1950       Chief Complaint   Patient presents with    New Patient Visit     Pt stubbed left toe on steps outside.        Allergies:  Patient has no known allergies.    Diagnosis:   Diagnosis ICD-10-CM Associated Orders   1. Abrasion of second toe, left, initial encounter  S90.415A lidocaine (LMX) 4 % cream     Wound cleansing and dressings Traumatic Anterior;Left Toe D2, second     mupirocin (BACTROBAN) 2 % ointment     Debridement      2. Type 2 diabetes mellitus with microalbuminuria, without long-term current use of insulin   E11.29     R80.9            Assessment  & Plan:    Abrasion of L second toe in setting of DM. Devitalized epidermis overlying granular wound bed with small serous driange. There is dried blood along the cuticle and bruising of the nail. No periwound maceration or erythema/edema of the digit to suggest acute soft tissue infection.   Selective debridement, as below.   Mupirocin to the wound bed. Keep covered with bandage. Change daily. Do not leave open to the air.  May cleanse site with gentle soap and water but avoid soaking in any standing bodies of water.   A1C results reviewed with the patient today.  Well-controlled at 6.6 as of 3 months prior.  Obtain 3-4 servings of protein daily for wound healing.   F/u in one week. Instructed to call if any questions or concerns arise in meantime.            Subjective:   03/01/24: 89 y/o M with PMHx of renal cell CA, HTN, type 2 DM presents for evaluation of a traumatic wound on his L second toe sustained about four days ago when transferring rooms in his home that are not exactly level. He had silver alginate from previous wound care appointment and placed silver alginate over the site for multiple days in a row. He currently does not have a dressing on the site of the wound.           The following portions of the patient's history were reviewed and updated as appropriate:   Patient  Active Problem List   Diagnosis    Encounter for diabetic foot exam (HCC)    Morbid obesity with BMI of 40.0-44.9, adult (HCC)    Essential hypertension    Lung nodule seen on imaging study    Mixed hyperlipidemia    Bilateral renal masses    Edema    Hyperaldosteronism (HCC)    Type 2 diabetes mellitus with microalbuminuria, without long-term current use of insulin     Gross hematuria    Renal cell carcinoma (HCC)    Benign prostatic hyperplasia with nocturia    Heart murmur    Urge incontinence     Past Medical History:   Diagnosis Date    Carotid artery occlusion     Disease of thyroid gland     Hypertension     Mixed hyperlipidemia     Peripheral vascular disease (HCC)     Renal mass      Past Surgical History:   Procedure Laterality Date    APPENDECTOMY      CARPAL TUNNEL RELEASE      CHOLECYSTECTOMY      COLONOSCOPY  2014 12 polyps     IR CRYOABLATION  10/20/2021    JOINT REPLACEMENT Left     TKR    JOINT REPLACEMENT Right     Total knee replacement     SHOULDER SURGERY Left     repair      Family History   Problem Relation Age of Onset    Breast cancer Mother     Heart disease Father     Stroke Father     Breast cancer Sister     Sudden death Brother         MVA    Obesity Brother     Diabetes Brother     Obesity Brother      Social History     Socioeconomic History    Marital status: /Civil Union     Spouse name: None    Number of children: None    Years of education: None    Highest education level: None   Occupational History    Occupation: [employer] in [employer] days    Tobacco Use    Smoking status: Never    Smokeless tobacco: Never    Tobacco comments:     Former smoker - As per [facility]    Vaping Use    Vaping status: Never Used   Substance and Sexual Activity    Alcohol use: Yes     Comment: occasionally    Drug use: Never    Sexual activity: None   Other Topics Concern    None   Social History Narrative    Consumes on average 3 cups of regular coffee per day      Social Determinants of Health      Financial Resource Strain: Low Risk  (11/2/2023)    Overall Financial Resource Strain (CARDIA)     Difficulty of Paying Living Expenses: Not very hard   Food Insecurity: Not on file   Transportation Needs: No Transportation Needs (11/2/2023)    PRAPARE - Transportation     Lack of Transportation (Medical): No     Lack of Transportation (Non-Medical): No   Physical Activity: Not on file   Stress: Not on file   Social Connections: Not on file   Intimate Partner Violence: Not on file   Housing Stability: Not on file       Current Outpatient Medications:     mupirocin (BACTROBAN) 2 % ointment, Apply topically daily for 7 days To open wound of left second toe, Disp: 22 g, Rfl: 0    amLODIPine (NORVASC) 10 mg tablet, TAKE 1 TABLET DAILY, Disp: 90 tablet, Rfl: 3    NELLY ASPIRIN EC LOW DOSE PO, Take 81 mg by mouth daily, Disp: , Rfl:     ezetimibe (ZETIA) 10 mg tablet, TAKE 1 TABLET DAILY, Disp: 90 tablet, Rfl: 3    glucose blood test strip, Use to test sugars four times daily CONTOUR TEST STRIP, Disp: 100 each, Rfl: 5    hydrochlorothiazide (HYDRODIURIL) 12.5 mg tablet, TAKE 1 TABLET DAILY, Disp: 90 tablet, Rfl: 3    Klor-Con M20 20 MEQ tablet, TAKE 1 TABLET DAILY, Disp: 90 tablet, Rfl: 3    metFORMIN (GLUCOPHAGE) 500 mg tablet, TAKE 2 TABLETS IN THE MORNING AND 1 TABLET AT NIGHT, Disp: 270 tablet, Rfl: 3    Multiple Vitamins-Minerals (CENTRUM SILVER 50+MEN PO), Centrum Silver, Disp: , Rfl:     OneTouch Delica Lancets 33G MISC, Use to test sugars three times daily. E11.9, Disp: 100 each, Rfl: 5    pioglitazone (ACTOS) 15 mg tablet, TAKE 1 TABLET DAILY, Disp: 90 tablet, Rfl: 3    Probiotic Product (PROBIOTIC ADVANCED PO), Take by mouth, Disp: , Rfl:     rosuvastatin (CRESTOR) 5 mg tablet, TAKE 1 TABLET DAILY, Disp: 90 tablet, Rfl: 3    tamsulosin (FLOMAX) 0.4 mg, TAKE 1 CAPSULE TWICE A DAY, Disp: 180 capsule, Rfl: 1    valsartan (DIOVAN) 160 mg tablet, TAKE 1 TABLET DAILY, Disp: 90 tablet, Rfl: 3  No current  "facility-administered medications for this visit.    Review of Systems   Constitutional:  Negative for chills and fever.   Skin:  Positive for wound (L second toe).         Objective:  /72   Pulse 80   Temp 97.5 °F (36.4 °C)   Resp 18   Ht 5' 6\" (1.676 m)   Wt 127 kg (280 lb)   BMI 45.19 kg/m²   Pain Score: 0-No pain     Physical Exam  Vitals reviewed.   Constitutional:       Appearance: He is morbidly obese. He is not ill-appearing.   Cardiovascular:      Rate and Rhythm: Normal rate.      Pulses:           Dorsalis pedis pulses are 2+ on the left side.        Posterior tibial pulses are 2+ on the left side.   Musculoskeletal:        Feet:    Feet:      Left foot:      Toenail Condition: Left toenails are abnormally thick.      Comments: Abrasion L second toe. Devitalized epidermis overlying granular wound bed with small serous driange. There is dried blood along the cuticle and bruising of the nail. No periwound maceration or erythema/edema of the digit to suggest acute soft tissue infection.     Neurological:      Mental Status: He is alert.   Psychiatric:         Mood and Affect: Mood normal.         Wound 03/01/24 Traumatic Toe D2, second Anterior;Left (Active)   Wound Image   03/01/24 0835   Wound Description Dry;Light purple;Pink 03/01/24 0800   Amelia-wound Assessment Dry;Intact 03/01/24 0800   Wound Length (cm) 0.1 cm 03/01/24 0800   Wound Width (cm) 0.1 cm 03/01/24 0800   Wound Depth (cm) 0.1 cm 03/01/24 0800   Wound Surface Area (cm^2) 0.01 cm^2 03/01/24 0800   Wound Volume (cm^3) 0.001 cm^3 03/01/24 0800   Calculated Wound Volume (cm^3) 0 cm^3 03/01/24 0800   Drainage Amount Scant 03/01/24 0800   Drainage Description Serous 03/01/24 0800   Non-staged Wound Description Full thickness 03/01/24 0800   Dressing Status Other (Comment) 03/01/24 0800                              Debridement   Wound 03/01/24 Traumatic Toe D2, second Anterior;Left    Universal Protocol:  Consent: Verbal consent " "obtained.  Consent given by: patient  Time out: Immediately prior to procedure a \"time out\" was called to verify the correct patient, procedure, equipment, support staff and site/side marked as required.  Patient understanding: patient states understanding of the procedure being performed  Patient identity confirmed: verbally with patient    Debridement Details  Performed by: PA  Debridement type: selective  Pain control: lidocaine 4%      Post-debridement measurements  Length (cm): 0.3  Width (cm): 0.3  Depth (cm): 0.1  Percent debrided: 100%  Surface Area (cm^2): 0.09  Area Debrided (cm^2): 0.09  Volume (cm^3): 0.01    Devitalized tissue debrided: necrotic debris  Instrument(s) utilized: forceps and blade  Bleeding: small  Hemostasis obtained with: pressure  Procedural pain (0-10): 0  Post-procedural pain: 0   Response to treatment: procedure was tolerated well                   Wound Instructions:  Orders Placed This Encounter   Procedures    Wound cleansing and dressings Traumatic Anterior;Left Toe D2, second     Left 2nd toe:    Wash your hands with soap and water.  Remove old dressing, discard into plastic bag and place in trash.  Cleanse the wound with unscented soap (such as plain white Dove soap) and warm water prior to applying a clean dressing. Do not use hydrogen peroxide, rubbing alcohol, or Dial soap.  Do not use tissue or cotton balls.   Do not scrub the wound. Pat dry using gauze.    Shower yes -Ok to remove old wound dressing, shower hair and body first. Let soapy water pass by wound. No scrubbing with loofahs or washcloths. Pat dry with clean gauze and redress the wound as written:       Apply Murpirocin to the left 2nd toe wound.  Cover with a band-aid    Change dressing daily      This was done today at the [place] wound center with Keesha COREAS.  Please follow up in 1 week(s).    A new oral antibiotic has been prescribed due to new findings during your assessment: Mupirocin. Please  from " "your pharmacy. If you have any questions, please call the wound center.          Please call the [facility] Monday through Friday between the hours of 8:00 AM and 4:30 PM at [phone] if you have any questions, if you experience any major changes in your wound(s) or for any signs or symptoms of infection such as fever; changes in the redness, swelling, drainage, or odor of your wound. After hours, weekends or holidays please contact your primary care physician or go to the hospital emergency room.          Increase your protein intake with each meal. Try meat, poultry, fish, seafood, legumes, lentils, tofu, protein supplements.     Standing Status:   Future     Standing Expiration Date:   3/1/2025    Debridement     This order was created via procedure documentation       Cally Rush, PA-C         Portions of the record may have been created with voice recognition software. Occasional wrong word or \"sound alike\" substitutions may have occurred due to the inherent limitations of voice recognition software. Read the chart carefully and recognize, using context, where substitutions have occurred.    "

## 2024-03-01 NOTE — PATIENT INSTRUCTIONS
Orders Placed This Encounter   Procedures    Wound cleansing and dressings Traumatic Anterior;Left Toe D2, second     Left 2nd toe:    Wash your hands with soap and water.  Remove old dressing, discard into plastic bag and place in trash.  Cleanse the wound with unscented soap (such as plain white Dove soap) and warm water prior to applying a clean dressing. Do not use hydrogen peroxide, rubbing alcohol, or Dial soap.  Do not use tissue or cotton balls.   Do not scrub the wound. Pat dry using gauze.    Shower yes -Ok to remove old wound dressing, shower hair and body first. Let soapy water pass by wound. No scrubbing with loofahs or washcloths. Pat dry with clean gauze and redress the wound as written:       Apply Murpirocin to the left 2nd toe wound.  Cover with a band-aid    Change dressing daily      This was done today at the [facility] with Keesha COREAS.  Please follow up in 1 week(s).    A new oral antibiotic has been prescribed due to new findings during your assessment: Mupirocin. Please  from your pharmacy. If you have any questions, please call the wound center.          Please call the [facility]aling Center Monday through Friday between the hours of 8:00 AM and 4:30 PM at [phone] if you have any questions, if you experience any major changes in your wound(s) or for any signs or symptoms of infection such as fever; changes in the redness, swelling, drainage, or odor of your wound. After hours, weekends or holidays please contact your primary care physician or go to the hospital emergency room.          Increase your protein intake with each meal. Try meat, poultry, fish, seafood, legumes, lentils, tofu, protein supplements.     Standing Status:   Future     Standing Expiration Date:   3/1/2025

## 2024-03-08 ENCOUNTER — OFFICE VISIT (OUTPATIENT)
Dept: WOUND CARE | Facility: CLINIC | Age: 74
End: 2024-03-08
Payer: MEDICARE

## 2024-03-08 VITALS
DIASTOLIC BLOOD PRESSURE: 76 MMHG | SYSTOLIC BLOOD PRESSURE: 140 MMHG | HEART RATE: 80 BPM | RESPIRATION RATE: 18 BRPM | TEMPERATURE: 98.6 F

## 2024-03-08 DIAGNOSIS — S90.415A: Primary | ICD-10-CM

## 2024-03-08 DIAGNOSIS — L60.1 TRAUMATIC ONYCHOLYSIS: ICD-10-CM

## 2024-03-08 DIAGNOSIS — R80.9 TYPE 2 DIABETES MELLITUS WITH MICROALBUMINURIA, WITHOUT LONG-TERM CURRENT USE OF INSULIN: ICD-10-CM

## 2024-03-08 DIAGNOSIS — E11.29 TYPE 2 DIABETES MELLITUS WITH MICROALBUMINURIA, WITHOUT LONG-TERM CURRENT USE OF INSULIN: ICD-10-CM

## 2024-03-08 PROCEDURE — 99212 OFFICE O/P EST SF 10 MIN: CPT | Performed by: STUDENT IN AN ORGANIZED HEALTH CARE EDUCATION/TRAINING PROGRAM

## 2024-03-08 PROCEDURE — 99213 OFFICE O/P EST LOW 20 MIN: CPT | Performed by: STUDENT IN AN ORGANIZED HEALTH CARE EDUCATION/TRAINING PROGRAM

## 2024-03-08 NOTE — PROGRESS NOTES
Patient ID: Hay Dela Cruz is a 73 y.o. male Date of Birth 1950       Chief Complaint   Patient presents with    Follow Up Wound Care Visit     Left 2nd toe       Allergies:  Patient has no known allergies.    Diagnosis:   Diagnosis ICD-10-CM Associated Orders   1. Abrasion of second toe, left, initial encounter  S90.415A Wound cleansing and dressings      2. Traumatic onycholysis  L60.1       3. Type 2 diabetes mellitus with microalbuminuria, without long-term current use of insulin   E11.29     R80.9            Assessment  & Plan:    F/u abrasion of L second toe in setting of DM. Is now healed. There is no drainage from the site or signs of acute infection present. Nail plate is loose but still attached.   Paint with betadine today and cover with bandage. Continue to cover with bandage in case nail falls off and there is an underlying wound.   Consider seeing podiatry for removal of loose nail plate. Should it come off on its own and there be an underlying wound he may use topical antibiotic ointment and keep the area covered until seen again.   D/c from wound care center. Call in future should any future wounds occur or be present following the loss of nail.            Subjective:   03/01/24: 89 y/o M with PMHx of renal cell CA, HTN, type 2 DM presents for evaluation of a traumatic wound on his L second toe sustained about four days ago when transferring rooms in his home that are not exactly level. He had silver alginate from previous wound care appointment and placed silver alginate over the site for multiple days in a row. He currently does not have a dressing on the site of the wound.     03/08/24: Pt presents for f/u traumatic wound of the L second toe. Has been using topical Mupirocin to the site. Nail bed has become loose but is still attached.           The following portions of the patient's history were reviewed and updated as appropriate:   Patient Active Problem List   Diagnosis    Encounter for  diabetic foot exam (HCC)    Morbid obesity with BMI of 40.0-44.9, adult (HCC)    Essential hypertension    Lung nodule seen on imaging study    Mixed hyperlipidemia    Bilateral renal masses    Edema    Hyperaldosteronism (HCC)    Type 2 diabetes mellitus with microalbuminuria, without long-term current use of insulin     Gross hematuria    Renal cell carcinoma (HCC)    Benign prostatic hyperplasia with nocturia    Heart murmur    Urge incontinence     Past Medical History:   Diagnosis Date    Carotid artery occlusion     Disease of thyroid gland     Hypertension     Mixed hyperlipidemia     Peripheral vascular disease (HCC)     Renal mass      Past Surgical History:   Procedure Laterality Date    APPENDECTOMY      CARPAL TUNNEL RELEASE      CHOLECYSTECTOMY      COLONOSCOPY  2014 12 polyps     IR CRYOABLATION  10/20/2021    JOINT REPLACEMENT Left     TKR    JOINT REPLACEMENT Right     Total knee replacement     SHOULDER SURGERY Left     repair      Family History   Problem Relation Age of Onset    Breast cancer Mother     Heart disease Father     Stroke Father     Breast cancer Sister     Sudden death Brother         MVA    Obesity Brother     Diabetes Brother     Obesity Brother      Social History     Socioeconomic History    Marital status: /Civil Union     Spouse name: None    Number of children: None    Years of education: None    Highest education level: None   Occupational History    Occupation: [employer] in steady days    Tobacco Use    Smoking status: Never    Smokeless tobacco: Never    Tobacco comments:     Former smoker - As per [facility]    Vaping Use    Vaping status: Never Used   Substance and Sexual Activity    Alcohol use: Yes     Comment: occasionally    Drug use: Never    Sexual activity: None   Other Topics Concern    None   Social History Narrative    Consumes on average 3 cups of regular coffee per day      Social Determinants of Health     Financial Resource Strain: Low Risk   (11/2/2023)    Overall Financial Resource Strain (CARDIA)     Difficulty of Paying Living Expenses: Not very hard   Food Insecurity: Not on file   Transportation Needs: No Transportation Needs (11/2/2023)    PRAPARE - Transportation     Lack of Transportation (Medical): No     Lack of Transportation (Non-Medical): No   Physical Activity: Not on file   Stress: Not on file   Social Connections: Not on file   Intimate Partner Violence: Not on file   Housing Stability: Not on file       Current Outpatient Medications:     amLODIPine (NORVASC) 10 mg tablet, TAKE 1 TABLET DAILY, Disp: 90 tablet, Rfl: 3    NELLY ASPIRIN EC LOW DOSE PO, Take 81 mg by mouth daily, Disp: , Rfl:     ezetimibe (ZETIA) 10 mg tablet, TAKE 1 TABLET DAILY, Disp: 90 tablet, Rfl: 3    glucose blood test strip, Use to test sugars four times daily CONTOUR TEST STRIP, Disp: 100 each, Rfl: 5    hydrochlorothiazide (HYDRODIURIL) 12.5 mg tablet, TAKE 1 TABLET DAILY, Disp: 90 tablet, Rfl: 3    Klor-Con M20 20 MEQ tablet, TAKE 1 TABLET DAILY, Disp: 90 tablet, Rfl: 3    metFORMIN (GLUCOPHAGE) 500 mg tablet, TAKE 2 TABLETS IN THE MORNING AND 1 TABLET AT NIGHT, Disp: 270 tablet, Rfl: 3    Multiple Vitamins-Minerals (CENTRUM SILVER 50+MEN PO), Centrum Silver, Disp: , Rfl:     mupirocin (BACTROBAN) 2 % ointment, Apply topically daily for 7 days To open wound of left second toe, Disp: 22 g, Rfl: 0    OneTouch Delica Lancets 33G MISC, Use to test sugars three times daily. E11.9, Disp: 100 each, Rfl: 5    pioglitazone (ACTOS) 15 mg tablet, TAKE 1 TABLET DAILY, Disp: 90 tablet, Rfl: 3    Probiotic Product (PROBIOTIC ADVANCED PO), Take by mouth, Disp: , Rfl:     rosuvastatin (CRESTOR) 5 mg tablet, TAKE 1 TABLET DAILY, Disp: 90 tablet, Rfl: 3    tamsulosin (FLOMAX) 0.4 mg, TAKE 1 CAPSULE TWICE A DAY, Disp: 180 capsule, Rfl: 1    valsartan (DIOVAN) 160 mg tablet, TAKE 1 TABLET DAILY, Disp: 90 tablet, Rfl: 3    Review of Systems   Constitutional:  Negative for chills and  fever.   Skin:  Negative for wound (L second toe).         Objective:  /76   Pulse 80   Temp 98.6 °F (37 °C)   Resp 18   Pain Score: 0-No pain     Physical Exam  Vitals reviewed.   Constitutional:       Appearance: He is morbidly obese. He is not ill-appearing.   Cardiovascular:      Rate and Rhythm: Normal rate.      Pulses:           Dorsalis pedis pulses are 2+ on the left side.        Posterior tibial pulses are 2+ on the left side.   Musculoskeletal:        Feet:    Feet:      Left foot:      Toenail Condition: Left toenails are abnormally thick.      Comments: Abrasion L second toe is now healed. There is no drainage from the site or signs of acute infection present. Nail plate is loose but still attached.     Neurological:      Mental Status: He is alert.   Psychiatric:         Mood and Affect: Mood normal.         Wound 03/01/24 Traumatic Toe D2, second Anterior;Left (Active)   Wound Image Images linked 03/08/24 1358   Wound Description Dry;Pink 03/08/24 1358   Amelia-wound Assessment Dry;Intact 03/08/24 1358   Wound Length (cm) 0.1 cm 03/08/24 1358   Wound Width (cm) 0.1 cm 03/08/24 1358   Wound Depth (cm) 0.1 cm 03/08/24 1358   Wound Surface Area (cm^2) 0.01 cm^2 03/08/24 1358   Wound Volume (cm^3) 0.001 cm^3 03/08/24 1358   Calculated Wound Volume (cm^3) 0 cm^3 03/08/24 1358   Drainage Amount None 03/08/24 1358   Drainage Description Unable to assess 03/08/24 1358   Dressing Status Other (Comment) (none upon arrival) 03/08/24 1358                     Wound Instructions:  Orders Placed This Encounter   Procedures    Wound cleansing and dressings     Wound cleansing and dressings Traumatic Anterior;Left Toe D2, second       Left 2nd toe:     Wash your hands with soap and water.  Remove old dressing, discard into plastic bag and place in trash.  Cleanse the wound with unscented soap (such as plain white Dove soap) and warm water prior to applying a clean dressing. Do not use hydrogen peroxide, rubbing  "alcohol, or Dial soap.  Do not use tissue or cotton balls.   Do not scrub the wound. Pat dry using gauze.     Shower yes -Ok to remove old wound dressing, shower hair and body first. Let soapy water pass by wound. No scrubbing with loofahs or washcloths. Pat dry with clean gauze and redress the wound as written:        Apply Betadine.  Cover with band-aid  Change dressing daily      **if toenail falls off, may use Mupirocin daily with band-aid.        This was done today at the [facility] with Keesha COREAS.    Please follow up with your Podiatrist.               Please call the Wound Healing Center Monday through Friday between the hours of 8:00 AM and 4:30 PM at [phone] if you have any questions, if you experience any major changes in your wound(s) or for any signs or symptoms of infection such as fever; changes in the redness, swelling, drainage, or odor of your wound. After hours, weekends or holidays please contact your primary care physician or go to the hospital emergency room.           Increase your protein intake with each meal. Try meat, poultry, fish, seafood, legumes, lentils, tofu, protein supplements.     Standing Status:   Future     Standing Expiration Date:   3/8/2025       Keesha Rush PA-C      Portions of the record may have been created with voice recognition software. Occasional wrong word or \"sound alike\" substitutions may have occurred due to the inherent limitations of voice recognition software. Read the chart carefully and recognize, using context, where substitutions have occurred.    "

## 2024-03-08 NOTE — PATIENT INSTRUCTIONS
Orders Placed This Encounter   Procedures    Wound cleansing and dressings     Wound cleansing and dressings Traumatic Anterior;Left Toe D2, second       Left 2nd toe:     Wash your hands with soap and water.  Remove old dressing, discard into plastic bag and place in trash.  Cleanse the wound with unscented soap (such as plain white Dove soap) and warm water prior to applying a clean dressing. Do not use hydrogen peroxide, rubbing alcohol, or Dial soap.  Do not use tissue or cotton balls.   Do not scrub the wound. Pat dry using gauze.     Shower yes -Ok to remove old wound dressing, shower hair and body first. Let soapy water pass by wound. No scrubbing with loofahs or washcloths. Pat dry with clean gauze and redress the wound as written:        Apply Betadine.  Cover with band-aid  Change dressing daily      **if toenail falls off, may use Mupirocin daily with band-aid.        This was done today at the [facility] with Keesha COREAS.    Please follow up with your Podiatrist.               Please call the Wound Healing Center Monday through Friday between the hours of 8:00 AM and 4:30 PM at [phone] if you have any questions, if you experience any major changes in your wound(s) or for any signs or symptoms of infection such as fever; changes in the redness, swelling, drainage, or odor of your wound. After hours, weekends or holidays please contact your primary care physician or go to the hospital emergency room.           Increase your protein intake with each meal. Try meat, poultry, fish, seafood, legumes, lentils, tofu, protein supplements.     Standing Status:   Future     Standing Expiration Date:   3/8/2025

## 2024-04-17 ENCOUNTER — LAB (OUTPATIENT)
Dept: LAB | Facility: CLINIC | Age: 74
End: 2024-04-17
Payer: MEDICARE

## 2024-04-17 DIAGNOSIS — Z12.5 PROSTATE CANCER SCREENING: ICD-10-CM

## 2024-04-17 DIAGNOSIS — R80.9 TYPE 2 DIABETES MELLITUS WITH MICROALBUMINURIA, WITHOUT LONG-TERM CURRENT USE OF INSULIN (HCC): ICD-10-CM

## 2024-04-17 DIAGNOSIS — E11.29 TYPE 2 DIABETES MELLITUS WITH MICROALBUMINURIA, WITHOUT LONG-TERM CURRENT USE OF INSULIN (HCC): ICD-10-CM

## 2024-04-17 DIAGNOSIS — E78.2 MIXED HYPERLIPIDEMIA: ICD-10-CM

## 2024-04-17 DIAGNOSIS — Z12.5 SCREENING PSA (PROSTATE SPECIFIC ANTIGEN): ICD-10-CM

## 2024-04-17 LAB
ALBUMIN SERPL BCP-MCNC: 4.2 G/DL (ref 3.5–5)
ALP SERPL-CCNC: 71 U/L (ref 34–104)
ALT SERPL W P-5'-P-CCNC: 31 U/L (ref 7–52)
ANION GAP SERPL CALCULATED.3IONS-SCNC: 10 MMOL/L (ref 4–13)
AST SERPL W P-5'-P-CCNC: 25 U/L (ref 13–39)
BASOPHILS # BLD AUTO: 0.06 THOUSANDS/ÂΜL (ref 0–0.1)
BASOPHILS NFR BLD AUTO: 1 % (ref 0–1)
BILIRUB SERPL-MCNC: 0.6 MG/DL (ref 0.2–1)
BUN SERPL-MCNC: 18 MG/DL (ref 5–25)
CALCIUM SERPL-MCNC: 8.9 MG/DL (ref 8.4–10.2)
CHLORIDE SERPL-SCNC: 104 MMOL/L (ref 96–108)
CHOLEST SERPL-MCNC: 107 MG/DL
CO2 SERPL-SCNC: 25 MMOL/L (ref 21–32)
CREAT SERPL-MCNC: 0.9 MG/DL (ref 0.6–1.3)
CREAT UR-MCNC: 124.2 MG/DL
EOSINOPHIL # BLD AUTO: 0.39 THOUSAND/ÂΜL (ref 0–0.61)
EOSINOPHIL NFR BLD AUTO: 5 % (ref 0–6)
ERYTHROCYTE [DISTWIDTH] IN BLOOD BY AUTOMATED COUNT: 13 % (ref 11.6–15.1)
GFR SERPL CREATININE-BSD FRML MDRD: 84 ML/MIN/1.73SQ M
GLUCOSE P FAST SERPL-MCNC: 125 MG/DL (ref 65–99)
HCT VFR BLD AUTO: 44.4 % (ref 36.5–49.3)
HDLC SERPL-MCNC: 41 MG/DL
HGB BLD-MCNC: 14.5 G/DL (ref 12–17)
IMM GRANULOCYTES # BLD AUTO: 0.02 THOUSAND/UL (ref 0–0.2)
IMM GRANULOCYTES NFR BLD AUTO: 0 % (ref 0–2)
LDLC SERPL CALC-MCNC: 44 MG/DL (ref 0–100)
LYMPHOCYTES # BLD AUTO: 1.64 THOUSANDS/ÂΜL (ref 0.6–4.47)
LYMPHOCYTES NFR BLD AUTO: 21 % (ref 14–44)
MCH RBC QN AUTO: 29.8 PG (ref 26.8–34.3)
MCHC RBC AUTO-ENTMCNC: 32.7 G/DL (ref 31.4–37.4)
MCV RBC AUTO: 91 FL (ref 82–98)
MICROALBUMIN UR-MCNC: 261.7 MG/L
MICROALBUMIN/CREAT 24H UR: 211 MG/G CREATININE (ref 0–30)
MONOCYTES # BLD AUTO: 0.86 THOUSAND/ÂΜL (ref 0.17–1.22)
MONOCYTES NFR BLD AUTO: 11 % (ref 4–12)
NEUTROPHILS # BLD AUTO: 4.76 THOUSANDS/ÂΜL (ref 1.85–7.62)
NEUTS SEG NFR BLD AUTO: 62 % (ref 43–75)
NRBC BLD AUTO-RTO: 0 /100 WBCS
PLATELET # BLD AUTO: 221 THOUSANDS/UL (ref 149–390)
PMV BLD AUTO: 10.7 FL (ref 8.9–12.7)
POTASSIUM SERPL-SCNC: 4 MMOL/L (ref 3.5–5.3)
PROT SERPL-MCNC: 6.9 G/DL (ref 6.4–8.4)
PSA SERPL-MCNC: 0.54 NG/ML (ref 0–4)
RBC # BLD AUTO: 4.87 MILLION/UL (ref 3.88–5.62)
SODIUM SERPL-SCNC: 139 MMOL/L (ref 135–147)
TRIGL SERPL-MCNC: 112 MG/DL
WBC # BLD AUTO: 7.73 THOUSAND/UL (ref 4.31–10.16)

## 2024-04-17 PROCEDURE — 36415 COLL VENOUS BLD VENIPUNCTURE: CPT

## 2024-04-17 PROCEDURE — G0103 PSA SCREENING: HCPCS

## 2024-04-18 LAB
EST. AVERAGE GLUCOSE BLD GHB EST-MCNC: 148 MG/DL
HBA1C MFR BLD: 6.8 %

## 2024-05-02 ENCOUNTER — OFFICE VISIT (OUTPATIENT)
Dept: FAMILY MEDICINE CLINIC | Facility: CLINIC | Age: 74
End: 2024-05-02
Payer: MEDICARE

## 2024-05-02 VITALS
SYSTOLIC BLOOD PRESSURE: 136 MMHG | BODY MASS INDEX: 45.48 KG/M2 | WEIGHT: 283 LBS | TEMPERATURE: 98 F | HEIGHT: 66 IN | DIASTOLIC BLOOD PRESSURE: 72 MMHG | HEART RATE: 67 BPM

## 2024-05-02 DIAGNOSIS — E66.01 MORBID OBESITY WITH BMI OF 40.0-44.9, ADULT (HCC): ICD-10-CM

## 2024-05-02 DIAGNOSIS — R80.9 TYPE 2 DIABETES MELLITUS WITH MICROALBUMINURIA, WITHOUT LONG-TERM CURRENT USE OF INSULIN (HCC): ICD-10-CM

## 2024-05-02 DIAGNOSIS — I10 ESSENTIAL HYPERTENSION: ICD-10-CM

## 2024-05-02 DIAGNOSIS — E11.29 TYPE 2 DIABETES MELLITUS WITH MICROALBUMINURIA, WITHOUT LONG-TERM CURRENT USE OF INSULIN (HCC): ICD-10-CM

## 2024-05-02 DIAGNOSIS — E78.2 MIXED HYPERLIPIDEMIA: Primary | ICD-10-CM

## 2024-05-02 DIAGNOSIS — C64.9 RENAL CELL CARCINOMA, UNSPECIFIED LATERALITY (HCC): ICD-10-CM

## 2024-05-02 PROBLEM — E26.9 HYPERALDOSTERONISM (HCC): Status: RESOLVED | Noted: 2021-03-29 | Resolved: 2024-05-02

## 2024-05-02 PROCEDURE — G2211 COMPLEX E/M VISIT ADD ON: HCPCS | Performed by: NURSE PRACTITIONER

## 2024-05-02 PROCEDURE — 99214 OFFICE O/P EST MOD 30 MIN: CPT | Performed by: NURSE PRACTITIONER

## 2024-05-02 NOTE — PROGRESS NOTES
Name: aHy Dela Cruz      : 1950      MRN: [id number]  Encounter Provider: BRANDI Mayer  Encounter Date: 2024   Encounter department: [facility]    Assessment & Plan     1. Mixed hyperlipidemia  -     Lipid Panel with Direct LDL reflex; Future; Expected date: 2024    2. Type 2 diabetes mellitus with microalbuminuria, without long-term current use of insulin (HCC)  -     Hemoglobin A1C; Future; Expected date: 2024  -     Comprehensive metabolic panel; Future; Expected date: 2024  -     CBC and differential; Future; Expected date: 2024  -     Albumin / creatinine urine ratio; Future; Expected date: 2024  -     tirzepatide (Mounjaro) 2.5 MG/0.5ML; Inject 0.5 mL (2.5 mg total) under the skin every 7 days    3. Essential hypertension    4. Renal cell carcinoma, unspecified laterality ([facility])    5. Morbid obesity with BMI of 40.0-44.9, adult ([facility])           Subjective      Here for 6 month medcheck and lab review- HgA1c increased from 6.6 to 6.8. He is requesting to start Mounjaro for DM and weight loss  Reviewed recent bloodwork with patient and his wife- all questions answered      Review of Systems   Constitutional:  Negative for activity change, diaphoresis, fatigue and fever.   HENT:  Negative for congestion, facial swelling, hearing loss, rhinorrhea, sinus pressure, sinus pain, sneezing, sore throat and voice change.    Eyes:  Negative for discharge and visual disturbance.   Respiratory:  Negative for cough, choking, chest tightness, shortness of breath, wheezing and stridor.    Cardiovascular:  Negative for chest pain, palpitations and leg swelling.   Gastrointestinal:  Negative for abdominal distention, abdominal pain, constipation, diarrhea, nausea and vomiting.   Endocrine: Negative for polydipsia, polyphagia and polyuria.   Genitourinary:  Negative for difficulty urinating, dysuria, frequency and urgency.   Musculoskeletal:  Negative for  "arthralgias, back pain, gait problem, joint swelling, myalgias, neck pain and neck stiffness.   Skin:  Negative for color change, rash and wound.   Neurological:  Negative for dizziness, syncope, speech difficulty, weakness, light-headedness and headaches.   Hematological:  Negative for adenopathy. Does not bruise/bleed easily.   Psychiatric/Behavioral:  Negative for agitation, behavioral problems, confusion, hallucinations, sleep disturbance and suicidal ideas. The patient is not nervous/anxious.        Current Outpatient Medications on File Prior to Visit   Medication Sig    amLODIPine (NORVASC) 10 mg tablet TAKE 1 TABLET DAILY    NELLY ASPIRIN EC LOW DOSE PO Take 81 mg by mouth daily    ezetimibe (ZETIA) 10 mg tablet TAKE 1 TABLET DAILY    glucose blood test strip Use to test sugars four times daily CONTOUR TEST STRIP    hydrochlorothiazide (HYDRODIURIL) 12.5 mg tablet TAKE 1 TABLET DAILY    Klor-Con M20 20 MEQ tablet TAKE 1 TABLET DAILY    metFORMIN (GLUCOPHAGE) 500 mg tablet TAKE 2 TABLETS IN THE MORNING AND 1 TABLET AT NIGHT    Multiple Vitamins-Minerals (CENTRUM SILVER 50+MEN PO) Centrum Silver    OneTouch Delica Lancets 33G MISC Use to test sugars three times daily. E11.9    pioglitazone (ACTOS) 15 mg tablet TAKE 1 TABLET DAILY    Probiotic Product (PROBIOTIC ADVANCED PO) Take by mouth    rosuvastatin (CRESTOR) 5 mg tablet TAKE 1 TABLET DAILY    tamsulosin (FLOMAX) 0.4 mg TAKE 1 CAPSULE TWICE A DAY    valsartan (DIOVAN) 160 mg tablet TAKE 1 TABLET DAILY    mupirocin (BACTROBAN) 2 % ointment Apply topically daily for 7 days To open wound of left second toe       Objective     /72   Pulse 67   Temp 98 °F (36.7 °C) (Temporal)   Ht 5' 6\" (1.676 m)   Wt 128 kg (283 lb)   BMI 45.68 kg/m²     Physical Exam  Vitals and nursing note reviewed. Exam conducted with a chaperone present (wife- Marilou).   Constitutional:       General: He is not in acute distress.     Appearance: Normal appearance. He is " well-developed. He is not diaphoretic.   Neck:      Thyroid: No thyromegaly.   Cardiovascular:      Rate and Rhythm: Normal rate and regular rhythm.      Pulses: no weak pulses.           Dorsalis pedis pulses are 2+ on the right side and 2+ on the left side.      Heart sounds: Normal heart sounds. No murmur heard.  Pulmonary:      Effort: Pulmonary effort is normal. No respiratory distress.      Breath sounds: Normal breath sounds. No wheezing.   Musculoskeletal:         General: No tenderness or deformity. Normal range of motion.      Cervical back: Normal range of motion and neck supple.   Feet:      Right foot:      Skin integrity: Dry skin present. No ulcer, skin breakdown, erythema, warmth or callus.      Left foot:      Skin integrity: Dry skin present. No ulcer, skin breakdown, erythema, warmth or callus.   Skin:     General: Skin is warm and dry.   Neurological:      Mental Status: He is alert and oriented to person, place, and time.   Psychiatric:         Mood and Affect: Mood normal.         Speech: Speech normal.         Behavior: Behavior normal.         Thought Content: Thought content normal.         Judgment: Judgment normal.     Patient's shoes and socks removed.    Right Foot/Ankle   Right Foot Inspection  Skin Exam: skin normal, skin intact and dry skin. No warmth, no callus, no erythema, no maceration, no abnormal color, no pre-ulcer, no ulcer and no callus.     Toe Exam: ROM and strength within normal limits.     Sensory   Monofilament testing: intact    Vascular  Capillary refills: < 3 seconds  The right DP pulse is 2+.     Left Foot/Ankle  Left Foot Inspection  Skin Exam: skin normal, skin intact and dry skin. No warmth, no erythema, no maceration, normal color, no pre-ulcer, no ulcer and no callus.     Toe Exam: ROM and strength within normal limits.     Sensory   Monofilament testing: intact    Vascular  Capillary refills: < 3 seconds  The left DP pulse is 2+.     Assign Risk Category  No  deformity present  No loss of protective sensation  No weak pulses  Risk: 0      BRANDI Mayer

## 2024-05-02 NOTE — PATIENT INSTRUCTIONS
Start Mounjaro 2.5mg weekly, increase in 4 weeks- call for next prescription when due  Continue other medications as directed  Get bloodwork done before next visit in 6 months, call sooner for problems/concerns

## 2024-05-03 LAB
LEFT EYE DIABETIC RETINOPATHY: NORMAL
RIGHT EYE DIABETIC RETINOPATHY: NORMAL

## 2024-05-22 DIAGNOSIS — R80.9 TYPE 2 DIABETES MELLITUS WITH MICROALBUMINURIA, WITHOUT LONG-TERM CURRENT USE OF INSULIN (HCC): ICD-10-CM

## 2024-05-22 DIAGNOSIS — E11.29 TYPE 2 DIABETES MELLITUS WITH MICROALBUMINURIA, WITHOUT LONG-TERM CURRENT USE OF INSULIN (HCC): ICD-10-CM

## 2024-05-22 NOTE — TELEPHONE ENCOUNTER
Pt needs a refill on his Mounjaro 2.5 mg injection  Dispense 2 mL  Pharmacy [facility] [email or web address]        Please advise

## 2024-06-10 ENCOUNTER — HOSPITAL ENCOUNTER (EMERGENCY)
Facility: HOSPITAL | Age: 74
Discharge: HOME | End: 2024-06-10
Attending: FAMILY MEDICINE
Payer: MEDICARE

## 2024-06-10 ENCOUNTER — APPOINTMENT (EMERGENCY)
Dept: CT IMAGING | Facility: HOSPITAL | Age: 74
End: 2024-06-10
Payer: MEDICARE

## 2024-06-10 VITALS
RESPIRATION RATE: 16 BRPM | TEMPERATURE: 98.3 F | HEART RATE: 71 BPM | DIASTOLIC BLOOD PRESSURE: 63 MMHG | OXYGEN SATURATION: 97 % | SYSTOLIC BLOOD PRESSURE: 135 MMHG

## 2024-06-10 DIAGNOSIS — N39.0 UTI (URINARY TRACT INFECTION): ICD-10-CM

## 2024-06-10 DIAGNOSIS — R31.9 HEMATURIA: Primary | ICD-10-CM

## 2024-06-10 LAB
ANION GAP SERPL CALCULATED.3IONS-SCNC: 9 MMOL/L (ref 4–13)
BACTERIA UR QL AUTO: ABNORMAL /HPF
BASOPHILS # BLD AUTO: 0.04 THOUSANDS/ÂΜL (ref 0–0.1)
BASOPHILS NFR BLD AUTO: 0 % (ref 0–1)
BILIRUB UR QL STRIP: NEGATIVE
BUN SERPL-MCNC: 22 MG/DL (ref 5–25)
CALCIUM SERPL-MCNC: 10 MG/DL (ref 8.4–10.2)
CHLORIDE SERPL-SCNC: 105 MMOL/L (ref 96–108)
CLARITY UR: ABNORMAL
CO2 SERPL-SCNC: 25 MMOL/L (ref 21–32)
COLOR UR: ABNORMAL
CREAT SERPL-MCNC: 1.01 MG/DL (ref 0.6–1.3)
EOSINOPHIL # BLD AUTO: 0.29 THOUSAND/ÂΜL (ref 0–0.61)
EOSINOPHIL NFR BLD AUTO: 3 % (ref 0–6)
ERYTHROCYTE [DISTWIDTH] IN BLOOD BY AUTOMATED COUNT: 12.7 % (ref 11.6–15.1)
GFR SERPL CREATININE-BSD FRML MDRD: 73 ML/MIN/1.73SQ M
GLUCOSE SERPL-MCNC: 118 MG/DL (ref 65–140)
GLUCOSE UR STRIP-MCNC: NEGATIVE MG/DL
HCT VFR BLD AUTO: 44.4 % (ref 36.5–49.3)
HGB BLD-MCNC: 14.5 G/DL (ref 12–17)
HGB UR QL STRIP.AUTO: ABNORMAL
IMM GRANULOCYTES # BLD AUTO: 0.06 THOUSAND/UL (ref 0–0.2)
IMM GRANULOCYTES NFR BLD AUTO: 1 % (ref 0–2)
KETONES UR STRIP-MCNC: NEGATIVE MG/DL
LEUKOCYTE ESTERASE UR QL STRIP: NEGATIVE
LYMPHOCYTES # BLD AUTO: 1.4 THOUSANDS/ÂΜL (ref 0.6–4.47)
LYMPHOCYTES NFR BLD AUTO: 13 % (ref 14–44)
MCH RBC QN AUTO: 30.3 PG (ref 26.8–34.3)
MCHC RBC AUTO-ENTMCNC: 32.7 G/DL (ref 31.4–37.4)
MCV RBC AUTO: 93 FL (ref 82–98)
MONOCYTES # BLD AUTO: 0.74 THOUSAND/ÂΜL (ref 0.17–1.22)
MONOCYTES NFR BLD AUTO: 7 % (ref 4–12)
NEUTROPHILS # BLD AUTO: 7.95 THOUSANDS/ÂΜL (ref 1.85–7.62)
NEUTS SEG NFR BLD AUTO: 76 % (ref 43–75)
NITRITE UR QL STRIP: NEGATIVE
NON-SQ EPI CELLS URNS QL MICRO: ABNORMAL /HPF
NRBC BLD AUTO-RTO: 0 /100 WBCS
PH UR STRIP.AUTO: 6 [PH]
PLATELET # BLD AUTO: 259 THOUSANDS/UL (ref 149–390)
PMV BLD AUTO: 9.4 FL (ref 8.9–12.7)
POTASSIUM SERPL-SCNC: 4.3 MMOL/L (ref 3.5–5.3)
PROT UR STRIP-MCNC: ABNORMAL MG/DL
RBC # BLD AUTO: 4.79 MILLION/UL (ref 3.88–5.62)
RBC #/AREA URNS AUTO: ABNORMAL /HPF
SODIUM SERPL-SCNC: 139 MMOL/L (ref 135–147)
SP GR UR STRIP.AUTO: 1.02
UROBILINOGEN UR QL STRIP.AUTO: 0.2 E.U./DL
WBC # BLD AUTO: 10.48 THOUSAND/UL (ref 4.31–10.16)
WBC #/AREA URNS AUTO: ABNORMAL /HPF

## 2024-06-10 PROCEDURE — 80048 BASIC METABOLIC PNL TOTAL CA: CPT | Performed by: FAMILY MEDICINE

## 2024-06-10 PROCEDURE — 99284 EMERGENCY DEPT VISIT MOD MDM: CPT

## 2024-06-10 PROCEDURE — 74177 CT ABD & PELVIS W/CONTRAST: CPT

## 2024-06-10 PROCEDURE — 85025 COMPLETE CBC W/AUTO DIFF WBC: CPT | Performed by: FAMILY MEDICINE

## 2024-06-10 PROCEDURE — 81001 URINALYSIS AUTO W/SCOPE: CPT | Performed by: FAMILY MEDICINE

## 2024-06-10 PROCEDURE — 81003 URINALYSIS AUTO W/O SCOPE: CPT | Performed by: FAMILY MEDICINE

## 2024-06-10 PROCEDURE — 36415 COLL VENOUS BLD VENIPUNCTURE: CPT | Performed by: FAMILY MEDICINE

## 2024-06-10 PROCEDURE — 99285 EMERGENCY DEPT VISIT HI MDM: CPT | Performed by: FAMILY MEDICINE

## 2024-06-10 RX ORDER — CEPHALEXIN 500 MG/1
500 CAPSULE ORAL EVERY 12 HOURS SCHEDULED
Qty: 14 CAPSULE | Refills: 0 | Status: SHIPPED | OUTPATIENT
Start: 2024-06-10 | End: 2024-06-17

## 2024-06-10 RX ADMIN — IOHEXOL 100 ML: 350 INJECTION, SOLUTION INTRAVENOUS at 11:33

## 2024-06-10 NOTE — ED NOTES
Patient provided with a bagged lunch and glass of water per Dr. Thompson.      Milan Montalvo, RN  06/10/24 [id number]

## 2024-06-10 NOTE — ED PROVIDER NOTES
History  Chief Complaint   Patient presents with    Blood in Urine     Patient comes in with blood in the urine and burning for the past two days. Reports increase in frequency. Denies fever, chills, nausea, vomiting, or back pain.      HPI  This is 73-year-old male presented with complaint of a bloated in his urine for past 2 days.  Patient also complaining of a dysuria urgency.  Denies any fever chills nausea vomiting or back pain.  Denies any other complaint.  Sitting comfortably in bed answer question appropriately.  Prior to Admission Medications   Prescriptions Last Dose Informant Patient Reported? Taking?   NELLY ASPIRIN EC LOW DOSE PO  Self Yes No   Sig: Take 81 mg by mouth daily   Klor-Con M20 20 MEQ tablet   No No   Sig: TAKE 1 TABLET DAILY   Multiple Vitamins-Minerals (CENTRUM SILVER 50+MEN PO)  Self Yes No   Sig: Centrum Silver   OneTouch Delica Lancets 33G MISC  Self No No   Sig: Use to test sugars three times daily. E11.9   Probiotic Product (PROBIOTIC ADVANCED PO)  Self Yes No   Sig: Take by mouth   amLODIPine (NORVASC) 10 mg tablet   No No   Sig: TAKE 1 TABLET DAILY   ezetimibe (ZETIA) 10 mg tablet   No No   Sig: TAKE 1 TABLET DAILY   glucose blood test strip   No No   Sig: Use to test sugars four times daily CONTOUR TEST STRIP   hydrochlorothiazide (HYDRODIURIL) 12.5 mg tablet   No No   Sig: TAKE 1 TABLET DAILY   metFORMIN (GLUCOPHAGE) 500 mg tablet   No No   Sig: TAKE 2 TABLETS IN THE MORNING AND 1 TABLET AT NIGHT   mupirocin (BACTROBAN) 2 % ointment   No No   Sig: Apply topically daily for 7 days To open wound of left second toe   pioglitazone (ACTOS) 15 mg tablet   No No   Sig: TAKE 1 TABLET DAILY   rosuvastatin (CRESTOR) 5 mg tablet   No No   Sig: TAKE 1 TABLET DAILY   tamsulosin (FLOMAX) 0.4 mg   No No   Sig: TAKE 1 CAPSULE TWICE A DAY   tirzepatide (Mounjaro) 2.5 MG/0.5ML   No No   Sig: Inject 0.5 mL (2.5 mg total) under the skin every 7 days   valsartan (DIOVAN) 160 mg tablet   No No   Sig:  TAKE 1 TABLET DAILY      Facility-Administered Medications: None       Past Medical History:   Diagnosis Date    Carotid artery occlusion     Disease of thyroid gland     Hypertension     Mixed hyperlipidemia     Peripheral vascular disease (HCC)     Renal mass        Past Surgical History:   Procedure Laterality Date    APPENDECTOMY      CARPAL TUNNEL RELEASE      CHOLECYSTECTOMY      COLONOSCOPY  2014    12 polyps     IR CRYOABLATION  10/20/2021    JOINT REPLACEMENT Left     TKR    JOINT REPLACEMENT Right     Total knee replacement     SHOULDER SURGERY Left     repair        Family History   Problem Relation Age of Onset    Breast cancer Mother     Heart disease Father     Stroke Father     Breast cancer Sister     Sudden death Brother         MVA    Obesity Brother     Diabetes Brother     Obesity Brother      I have reviewed and agree with the history as documented.    E-Cigarette/Vaping    E-Cigarette Use Never User      E-Cigarette/Vaping Substances    Nicotine No     THC No     CBD No     Flavoring No     Other No     Unknown No      Social History     Tobacco Use    Smoking status: Never    Smokeless tobacco: Never    Tobacco comments:     Former smoker - As per [facility]    Vaping Use    Vaping status: Never Used   Substance Use Topics    Alcohol use: Yes     Comment: occasionally    Drug use: Never       Review of Systems    Physical Exam  Physical Exam    Vital Signs  ED Triage Vitals   Temperature Pulse Respirations Blood Pressure SpO2   06/10/24 1023 06/10/24 1023 06/10/24 1023 06/10/24 1023 06/10/24 1023   98.3 °F (36.8 °C) 81 18 131/72 95 %      Temp Source Heart Rate Source Patient Position - Orthostatic VS BP Location FiO2 (%)   06/10/24 1023 06/10/24 1337 06/10/24 1023 06/10/24 1023 --   Oral Monitor Sitting Left arm       Pain Score       06/10/24 1023       No Pain           Vitals:    06/10/24 1023 06/10/24 1337   BP: 131/72 135/63   Pulse: 81 71   Patient Position - Orthostatic VS: Sitting           Visual Acuity      ED Medications  Medications   iohexol (OMNIPAQUE) 350 MG/ML injection (SINGLE-DOSE) 100 mL (100 mL Intravenous Given 6/10/24 1133)       Diagnostic Studies  Results Reviewed       Procedure Component Value Units Date/Time    Basic metabolic panel [[id number]] Collected: 06/10/24 1055    Lab Status: Final result Specimen: Blood from Arm, Left Updated: 06/10/24 1121     Sodium 139 mmol/L      Potassium 4.3 mmol/L      Chloride 105 mmol/L      CO2 25 mmol/L      ANION GAP 9 mmol/L      BUN 22 mg/dL      Creatinine 1.01 mg/dL      Glucose 118 mg/dL      Calcium 10.0 mg/dL      eGFR 73 ml/min/1.73sq m     Narrative:      National Kidney [employer] Foundation guidelines for Chronic Kidney Disease (CKD):     Stage 1 with normal or high GFR (GFR > 90 mL/min/1.73 square meters)    Stage 2 Mild CKD (GFR = 60-89 mL/min/1.73 square meters)    Stage 3A Moderate CKD (GFR = 45-59 mL/min/1.73 square meters)    Stage 3B Moderate CKD (GFR = 30-44 mL/min/1.73 square meters)    Stage 4 Severe CKD (GFR = 15-29 mL/min/1.73 square meters)    Stage 5 End Stage CKD (GFR <15 mL/min/1.73 square meters)  Note: GFR calculation is accurate only with a steady state creatinine    CBC and differential [[id number]]  (Abnormal) Collected: 06/10/24 1055    Lab Status: Final result Specimen: Blood from Arm, Left Updated: 06/10/24 1059     WBC 10.48 Thousand/uL      RBC 4.79 Million/uL      Hemoglobin 14.5 g/dL      Hematocrit 44.4 %      MCV 93 fL      MCH 30.3 pg      MCHC 32.7 g/dL      RDW 12.7 %      MPV 9.4 fL      Platelets 259 Thousands/uL      nRBC 0 /100 WBCs      Segmented % 76 %      Immature Grans % 1 %      Lymphocytes % 13 %      Monocytes % 7 %      Eosinophils Relative 3 %      Basophils Relative 0 %      Absolute Neutrophils 7.95 Thousands/µL      Absolute Immature Grans 0.06 Thousand/uL      Absolute Lymphocytes 1.40 Thousands/µL      Absolute Monocytes 0.74 Thousand/µL      Eosinophils Absolute 0.29 Thousand/µL       Basophils Absolute 0.04 Thousands/µL     Urine Microscopic [[id number]]  (Abnormal) Collected: 06/10/24 1031    Lab Status: Final result Specimen: Urine, Clean Catch Updated: 06/10/24 1056     RBC, UA Innumerable /hpf      WBC, UA 0-1 /hpf      Epithelial Cells Occasional /hpf      Bacteria, UA None Seen /hpf     UA w Reflex to Microscopic w Reflex to Culture [[id number]]  (Abnormal) Collected: 06/10/24 1031    Lab Status: Final result Specimen: Urine, Clean Catch Updated: 06/10/24 1050     Color, UA Red     Clarity, UA Cloudy     Specific Gravity, UA 1.025     pH, UA 6.0     Leukocytes, UA Negative     Nitrite, UA Negative     Protein, UA 3+ mg/dl      Glucose, UA Negative mg/dl      Ketones, UA Negative mg/dl      Urobilinogen, UA 0.2 E.U./dl      Bilirubin, UA Negative     Occult Blood, UA 3+                   CT abdomen pelvis with contrast   Final Result by Mitra Max MD (06/10 1319)   No hydroureteronephrosis or radiopaque urolithiasis.   Unchanged subcentimeter isodense left lower pole nodule, hemorrhagic cyst versus solid. Given stability since at least 2022, it is likely nonaggressive.   Unchanged bilateral renal cysts and subcentimeter hypodensities.   Hypertrophy of the median lobe of the prostate protruding into the bladder. It may reflect BPH but given hematuria, correlate clinically to exclude prostatic cancer.   Diffuse bladder wall thickening, likely due to prostatomegaly, but correlate to exclude symptoms of cystitis.   Incidental findings, as per the body of the report.            Workstation performed: [id number]                    Procedures  Procedures         ED Course  ED Course as of 06/10/24 1434   Mon Joaquín 10, 2024   [address] texted Dr. Bird waiting for callback.                               SBIRT 22yo+      Flowsheet Row Most Recent Value   Initial Alcohol Screen: US AUDIT-C     1. How often do you have a drink containing alcohol? 0 Filed at: 06/10/2024 1025   2. How many drinks  containing alcohol do you have on a typical day you are drinking?  0 Filed at: 06/10/2024 1025   3a. Male UNDER 65: How often do you have five or more drinks on one occasion? 0 Filed at: 06/10/2024 1025   3b. FEMALE Any Age, or MALE 65+: How often do you have 4 or more drinks on one occassion? 0 Filed at: 06/10/2024 1025   Audit-C Score 0 Filed at: 06/10/2024 1025   ISIDRO: How many times in the past year have you...    Used an illegal drug or used a prescription medication for non-medical reasons? Never Filed at: 06/10/2024 1025                      Medical Decision Making  This is 73-year-old male presented with complaint of a bloated in his urine for past 2 days.  Patient also complaining of a dysuria urgency.  Denies any fever chills nausea vomiting or back pain.  Denies any other complaint.  Sitting comfortably in bed answer question appropriately.  History is taken from patient and the wife who is at bedside  Differential diagnoses include UTI/pyelonephritis/malignancy  Plan will obtain UA CBC BMP CT abdomen pelvis with contrast  Lab reviewed within normal limits UA shows blood CT shows  No hydroureteronephrosis or radiopaque urolithiasis.  Unchanged subcentimeter isodense left lower pole nodule, hemorrhagic cyst versus solid. Given stability since at least 2022, it is likely nonaggressive.  Unchanged bilateral renal cysts and subcentimeter hypodensities.  Hypertrophy of the median lobe of the prostate protruding into the bladder. It may reflect BPH but given hematuria, correlate clinically to exclude prostatic cancer.  Diffuse bladder wall thickening, likely due to prostatomegaly, but correlate to exclude symptoms of cystitis.  Incidental findings, as per the body of the report.     Texted Dr. Bird waiting for callback however patient is requesting to be discharged we will start patient on UTI treatment and for having him to follow-up with urology as an outpatient.  CT finding discussed with patient.  Case  discussed with Dr. Bird recommended to obtain a postvoid residual which was a 35 mL patient has known difficulty urinating otherwise patient to follow-up outpatient.  This was discussed with patient patient feels comfortable going home and following up outpatient.  Will start patient on Keflex for his urinary tract infection given that his symptoms and CT finding.  Disposition discharge home with the strict precaution to return to the ED if noticing worsening symptoms including fever chills difficulty urinating return back to the ED.  Patient verbalized understand the plan discharge home.    Amount and/or Complexity of Data Reviewed  Labs: ordered.  Radiology: ordered.    Risk  Prescription drug management.             Disposition  Final diagnoses:   Hematuria   UTI (urinary tract infection)     Time reflects when diagnosis was documented in both MDM as applicable and the Disposition within this note       Time User Action Codes Description Comment    6/10/2024  2:10 PM Kenney Thompson Add [R31.9] Hematuria     6/10/2024  2:10 PM Kenney Thompson Add [N39.0] UTI (urinary tract infection)           ED Disposition       ED Disposition   Discharge    Condition   Stable    Date/Time   Mon Joaquín 10, 2024 1410    Comment   Hay Dela Cruz discharge to home/self care.                   Follow-up Information       Follow up With Specialties Details Why Contact Info    Kavon Bird MD Urology Call today  [address] [place] [address]  [phone]              Discharge Medication List as of 6/10/2024  2:11 PM        START taking these medications    Details   cephalexin (KEFLEX) 500 mg capsule Take 1 capsule (500 mg total) by mouth every 12 (twelve) hours for 7 days, Starting Mon 6/10/2024, Until Mon 6/17/2024, Normal           CONTINUE these medications which have NOT CHANGED    Details   amLODIPine (NORVASC) 10 mg tablet TAKE 1 TABLET DAILY, Starting Wed 11/29/2023, Normal      NELLY ASPIRIN EC LOW DOSE PO Take 81 mg by  mouth daily, Historical Med      ezetimibe (ZETIA) 10 mg tablet TAKE 1 TABLET DAILY, Starting Mon 12/11/2023, Normal      glucose blood test strip Use to test sugars four times daily CONTOUR TEST STRIP, Normal      hydrochlorothiazide (HYDRODIURIL) 12.5 mg tablet TAKE 1 TABLET DAILY, Starting Wed 11/29/2023, Normal      Klor-Con M20 20 MEQ tablet TAKE 1 TABLET DAILY, Starting Wed 10/25/2023, Normal      metFORMIN (GLUCOPHAGE) 500 mg tablet TAKE 2 TABLETS IN THE MORNING AND 1 TABLET AT NIGHT, Normal      Multiple Vitamins-Minerals (CENTRUM SILVER 50+MEN PO) Centrum Silver, Historical Med      mupirocin (BACTROBAN) 2 % ointment Apply topically daily for 7 days To open wound of left second toe, Starting Fri 3/1/2024, Until Fri 3/8/2024, Normal      OneTouch Delica Lancets 33G MISC Use to test sugars three times daily. E11.9, Normal      pioglitazone (ACTOS) 15 mg tablet TAKE 1 TABLET DAILY, Starting Wed 11/29/2023, Normal      Probiotic Product (PROBIOTIC ADVANCED PO) Take by mouth, Historical Med      rosuvastatin (CRESTOR) 5 mg tablet TAKE 1 TABLET DAILY, Starting Wed 11/29/2023, Normal      tamsulosin (FLOMAX) 0.4 mg TAKE 1 CAPSULE TWICE A DAY, Starting Mon 2/12/2024, Normal      tirzepatide (Mounjaro) 2.5 MG/0.5ML Inject 0.5 mL (2.5 mg total) under the skin every 7 days, Starting Thu 5/23/2024, Normal      valsartan (DIOVAN) 160 mg tablet TAKE 1 TABLET DAILY, Starting Wed 11/29/2023, Normal             No discharge procedures on file.    PDMP Review         Value Time User    PDMP Reviewed  Yes 10/20/2021 11:55 AM Jag Resendiz MD            ED Provider  Electronically Signed by             Kenney Thompson MD  06/10/24 4346

## 2024-06-10 NOTE — DISCHARGE INSTRUCTIONS
No hydroureteronephrosis or radiopaque urolithiasis.  Unchanged subcentimeter isodense left lower pole nodule, hemorrhagic cyst versus solid. Given stability since at least 2022, it is likely nonaggressive.  Unchanged bilateral renal cysts and subcentimeter hypodensities.  Hypertrophy of the median lobe of the prostate protruding into the bladder. It may reflect BPH but given hematuria, correlate clinically to exclude prostatic cancer.  Diffuse bladder wall thickening, likely due to prostatomegaly, but correlate to exclude symptoms of cystitis.  Incidental findings, as per the body of the report.

## 2024-06-13 ENCOUNTER — OFFICE VISIT (OUTPATIENT)
Dept: UROLOGY | Facility: CLINIC | Age: 74
End: 2024-06-13
Payer: MEDICARE

## 2024-06-13 VITALS
OXYGEN SATURATION: 96 % | WEIGHT: 269.4 LBS | DIASTOLIC BLOOD PRESSURE: 66 MMHG | HEART RATE: 91 BPM | SYSTOLIC BLOOD PRESSURE: 114 MMHG | BODY MASS INDEX: 43.3 KG/M2 | HEIGHT: 66 IN

## 2024-06-13 DIAGNOSIS — R31.0 GROSS HEMATURIA: Primary | ICD-10-CM

## 2024-06-13 DIAGNOSIS — R30.0 DYSURIA: ICD-10-CM

## 2024-06-13 LAB
SL AMB  POCT GLUCOSE, UA: NEGATIVE
SL AMB LEUKOCYTE ESTERASE,UA: NORMAL
SL AMB POCT BILIRUBIN,UA: NEGATIVE
SL AMB POCT BLOOD,UA: NEGATIVE
SL AMB POCT CLARITY,UA: CLEAR
SL AMB POCT COLOR,UA: YELLOW
SL AMB POCT KETONES,UA: NEGATIVE
SL AMB POCT NITRITE,UA: NEGATIVE
SL AMB POCT PH,UA: 5.5
SL AMB POCT SPECIFIC GRAVITY,UA: 1.02
SL AMB POCT URINE PROTEIN: NORMAL
SL AMB POCT UROBILINOGEN: 0.2

## 2024-06-13 PROCEDURE — 81002 URINALYSIS NONAUTO W/O SCOPE: CPT

## 2024-06-13 PROCEDURE — 99213 OFFICE O/P EST LOW 20 MIN: CPT

## 2024-06-13 PROCEDURE — 88112 CYTOPATH CELL ENHANCE TECH: CPT | Performed by: PATHOLOGY

## 2024-06-14 DIAGNOSIS — E11.29 TYPE 2 DIABETES MELLITUS WITH MICROALBUMINURIA, WITHOUT LONG-TERM CURRENT USE OF INSULIN (HCC): ICD-10-CM

## 2024-06-14 DIAGNOSIS — R80.9 TYPE 2 DIABETES MELLITUS WITH MICROALBUMINURIA, WITHOUT LONG-TERM CURRENT USE OF INSULIN (HCC): ICD-10-CM

## 2024-06-14 RX ORDER — TIRZEPATIDE 2.5 MG/.5ML
INJECTION, SOLUTION SUBCUTANEOUS
Qty: 4 ML | Refills: 0 | Status: SHIPPED | OUTPATIENT
Start: 2024-06-14

## 2024-06-17 ENCOUNTER — TELEPHONE (OUTPATIENT)
Dept: UROLOGY | Facility: CLINIC | Age: 74
End: 2024-06-17

## 2024-06-17 PROCEDURE — 88112 CYTOPATH CELL ENHANCE TECH: CPT | Performed by: PATHOLOGY

## 2024-06-17 NOTE — TELEPHONE ENCOUNTER
----- Message from BRANDI Krueger sent at 6/17/2024 11:50 AM EDT -----  Please let patient know that his urine cytology was negative.

## 2024-06-17 NOTE — TELEPHONE ENCOUNTER
Called patient and patient's wife, Marilou, answered the phone. Per communication consent I was able to inform Marilou that the patient's urine cytology came back negative. Patient's wife confirmed understanding and was thankful for the call.

## 2024-08-07 DIAGNOSIS — R80.9 TYPE 2 DIABETES MELLITUS WITH MICROALBUMINURIA, WITHOUT LONG-TERM CURRENT USE OF INSULIN (HCC): ICD-10-CM

## 2024-08-07 DIAGNOSIS — E11.29 TYPE 2 DIABETES MELLITUS WITH MICROALBUMINURIA, WITHOUT LONG-TERM CURRENT USE OF INSULIN (HCC): ICD-10-CM

## 2024-08-07 RX ORDER — TIRZEPATIDE 2.5 MG/.5ML
INJECTION, SOLUTION SUBCUTANEOUS
Qty: 8 ML | Refills: 0 | Status: SHIPPED | OUTPATIENT
Start: 2024-08-07 | End: 2024-08-09 | Stop reason: SDUPTHER

## 2024-08-09 DIAGNOSIS — N39.41 URGE INCONTINENCE: ICD-10-CM

## 2024-08-09 DIAGNOSIS — E11.29 TYPE 2 DIABETES MELLITUS WITH MICROALBUMINURIA, WITHOUT LONG-TERM CURRENT USE OF INSULIN (HCC): ICD-10-CM

## 2024-08-09 DIAGNOSIS — N40.1 BENIGN PROSTATIC HYPERPLASIA WITH NOCTURIA: ICD-10-CM

## 2024-08-09 DIAGNOSIS — R80.9 TYPE 2 DIABETES MELLITUS WITH MICROALBUMINURIA, WITHOUT LONG-TERM CURRENT USE OF INSULIN (HCC): ICD-10-CM

## 2024-08-09 DIAGNOSIS — R35.1 BENIGN PROSTATIC HYPERPLASIA WITH NOCTURIA: ICD-10-CM

## 2024-08-09 RX ORDER — TAMSULOSIN HYDROCHLORIDE 0.4 MG/1
0.4 CAPSULE ORAL 2 TIMES DAILY
Qty: 180 CAPSULE | Refills: 1 | Status: SHIPPED | OUTPATIENT
Start: 2024-08-09

## 2024-08-09 NOTE — TELEPHONE ENCOUNTER
Reason for call:   [x] Refill   [] Prior Auth  [] Other:     Office:   [] PCP/Provider -   [] Specialty/Provider -     Medication: Mounjaro 2.5 MG/0.5ML    Dose/Frequency: INJECT 1/2 (ONE-HALF) ML SUBCUTANEOUSLY ONCE A WEEK    Quantity: 8 mL    Pharmacy: [facility] [address] [place] [address] [id number]     Does the patient have enough for 3 days?   [] Yes   [x] No - Send as HP to POD    Patient's spouse is wondering if he is ready to move up in dosage. Please correct if so.

## 2024-08-12 NOTE — TELEPHONE ENCOUNTER
Patient's wife came into office with some information. [facility] gave her number of [phone] to be called for auth.

## 2024-08-13 ENCOUNTER — TELEPHONE (OUTPATIENT)
Age: 74
End: 2024-08-13

## 2024-08-13 NOTE — TELEPHONE ENCOUNTER
PA for TIRZEPATIDE (MOUNJARO) 2.5 MG/0.5ML  NOT REQUIRED     Reason (screenshot if applicable)          Patient advised by          [] MyChart Message  [] Phone call   []LMOM  []L/M to call office as no active Communication consent on file  []Unable to leave detailed message as VM not approved on Communication consent       Pharmacy advised by    []Fax  []Phone call

## 2024-08-13 NOTE — TELEPHONE ENCOUNTER
PA for TIRZEPATIDE (MOUNJARO) 2.5 MG/0.5ML SUBMITTED     via    []CMM-KEY   [x]Surescripts-Case ID #   []Faxed to plan   []Other website   []Phone call Case ID #     Office notes sent, clinical questions answered. Awaiting determination    Turnaround time for your insurance to make a decision on your Prior Authorization can take 7-21 business days.

## 2024-08-19 NOTE — PROGRESS NOTES
UROLOGY PROGRESS NOTE   [facility]  [address] [place] [address] [place]  [address]  [place] [address]  [phone]  Fax:[phone]  [email or web address]      NAME: Hay Dela Cruz  AGE: 74 y.o. SEX: male  : 1950   MRN: [id number]    DATE: 2024  TIME: 8:35 AM    Assessment and Plan:    Gross hematuria due to BPH with obstruction with trilobar hypertrophy and friable vessels.  He remains on tamsulosin.  We discussed finasteride or dutasteride to his regimen.  We also discussed the benefits and the potential side effects and at this time he wishes to wait on that.  We will call in this prescription upon his request.    Renal cell carcinoma cryoablation [id number] 6 months with a kidney bladder ultrasound with PVR.               Chief Complaint   No chief complaint on file.      History of Present Illness   Last seen by me 2021 for nocturia and urge incontinence but no major frequency.  It sounded like overactive bladder.  She was seen by Mr. Ulloa 2024 with gross hematuria.  CT scan unremarkable.  Treated for suspected UTI and was prescribed Keflex.  Urine culture was negative for infection.  He has been set up for cystoscopy.  He has a history of right renal cell carcinoma status post cryoablation 2021.  Surveillance CT imaging showed no metastatic disease in the abdomen stable cryoablation zone in the right kidney.  He did have bilateral renal cysts consistent with proteinaceous versus hemorrhagic cyst as possible gross hematuria could have been from one of the cysts.  No stones.  Urine cytology - 2024.  His only voiding symptoms are urgency and some urge incontinence if he cannot make to the bathroom in time.  He does drink a lot of water.  He does also have postvoid dribbling.  Nocturia 2-3 times a night.  PSA 0.54 2024 which is low and stable for him.     Cystoscopy     Date/Time  2024 8:30 AM     Performed by  Brandon Corado MD   Authorized  by  BRANDI Herman     Universal Protocol:  Consent: Verbal consent obtained. Written consent obtained.      Procedure Details:  Procedure type: cystoscopy    Additional Procedure Details: Cystoscopy Procedure Note        Pre-operative Diagnosis: Gross hematuria    Post-operative Diagnosis: Same, due to BPH with friable vessels with median lobe and prostatic intrusion    Procedure: Flexible cystoscopy    Surgeon: Brandon Corado MD    Anesthesia: 1% Xylocaine per urethra    EBL: Minimal    Complications: none    Procedure Details   The risks, benefits, complications, treatment options, and expected outcomes were discussed with the patient. The patient concurred with the proposed plan, giving informed consent.    Cystoscopy was performed today under local anesthesia, using sterile technique. The patient was placed in the supine position, prepped with Betadine, and draped in the usual sterile fashion. The flexible cystocope was used to inspect both the urethra and bladder    Findings:  Urethra: Stricture.  Trilobar hypertrophy with large median lobe and friable vessels.    Bladder: 2+ trabeculated with multiple small cellules.  And there were no stones tumors or other lesions.  The orifices were orthotopic and intact.           Specimens: none                 Complications:  None           Disposition: To home            Condition:  Stable              The following portions of the patient's history were reviewed and updated as appropriate: allergies, current medications, past family history, past medical history, past social history, past surgical history and problem list.  Past Medical History:   Diagnosis Date    Carotid artery occlusion     Disease of thyroid gland     Hypertension     Mixed hyperlipidemia     Peripheral vascular disease (HCC)     Renal mass      Past Surgical History:   Procedure Laterality Date    APPENDECTOMY      CARPAL TUNNEL RELEASE      CHOLECYSTECTOMY      COLONOSCOPY  2014 12 polyps   "   IR CRYOABLATION  10/20/2021    JOINT REPLACEMENT Left     TKR    JOINT REPLACEMENT Right     Total knee replacement     SHOULDER SURGERY Left     repair      shoulder  Review of Systems   Review of Systems   Genitourinary:  Positive for hematuria and urgency. Negative for difficulty urinating.       Active Problem List     Patient Active Problem List   Diagnosis    Encounter for diabetic foot exam (HCC)    Morbid obesity with BMI of 40.0-44.9, adult (HCC)    Essential hypertension    Lung nodule seen on imaging study    Mixed hyperlipidemia    Bilateral renal masses    Edema    Type 2 diabetes mellitus with microalbuminuria, without long-term current use of insulin (HCC)    Gross hematuria    Renal cell carcinoma (HCC)    Benign prostatic hyperplasia with nocturia    Heart murmur    Urge incontinence       Objective   /80   Pulse 76   Ht 5' 6\" (1.676 m)   Wt 120 kg (264 lb)   SpO2 96%   BMI 42.61 kg/m²     Physical Exam  Vitals reviewed.   Constitutional:       Appearance: Normal appearance.   HENT:      Head: Normocephalic and atraumatic.   Eyes:      Extraocular Movements: Extraocular movements intact.   Pulmonary:      Effort: Pulmonary effort is normal.   Genitourinary:     Penis: Normal.    Musculoskeletal:         General: Normal range of motion.      Cervical back: Normal range of motion.      Right lower leg: No edema.      Left lower leg: No edema.   Skin:     Coloration: Skin is not jaundiced or pale.   Neurological:      General: No focal deficit present.      Mental Status: He is alert and oriented to person, place, and time. Mental status is at baseline.   Psychiatric:         Mood and Affect: Mood normal.         Behavior: Behavior normal.         Thought Content: Thought content normal.         Judgment: Judgment normal.             Current Medications     Current Outpatient Medications:     amLODIPine (NORVASC) 10 mg tablet, TAKE 1 TABLET DAILY, Disp: 90 tablet, Rfl: 3    NELLY ASPIRIN " EC LOW DOSE PO, Take 81 mg by mouth daily, Disp: , Rfl:     cephalexin (KEFLEX) 500 mg capsule, Take 1 capsule (500 mg total) by mouth 1 (one) time for 1 dose Take after procedure, Disp: 1 capsule, Rfl: 0    ezetimibe (ZETIA) 10 mg tablet, TAKE 1 TABLET DAILY, Disp: 90 tablet, Rfl: 3    hydrochlorothiazide (HYDRODIURIL) 12.5 mg tablet, TAKE 1 TABLET DAILY, Disp: 90 tablet, Rfl: 3    Klor-Con M20 20 MEQ tablet, TAKE 1 TABLET DAILY, Disp: 90 tablet, Rfl: 3    metFORMIN (GLUCOPHAGE) 500 mg tablet, TAKE 2 TABLETS IN THE MORNING AND 1 TABLET AT NIGHT, Disp: 270 tablet, Rfl: 3    Multiple Vitamins-Minerals (CENTRUM SILVER 50+MEN PO), Centrum Silver, Disp: , Rfl:     mupirocin (BACTROBAN) 2 % ointment, Apply topically daily for 7 days To open wound of left second toe, Disp: 22 g, Rfl: 0    pioglitazone (ACTOS) 15 mg tablet, TAKE 1 TABLET DAILY, Disp: 90 tablet, Rfl: 3    rosuvastatin (CRESTOR) 5 mg tablet, TAKE 1 TABLET DAILY, Disp: 90 tablet, Rfl: 3    tamsulosin (FLOMAX) 0.4 mg, TAKE 1 CAPSULE TWICE A DAY, Disp: 180 capsule, Rfl: 1    tirzepatide (Mounjaro) 2.5 MG/0.5ML, INJECT 1/2 (ONE-HALF) ML SUBCUTANEOUSLY  ONCE A WEEK, Disp: 8 mL, Rfl: 0    valsartan (DIOVAN) 160 mg tablet, TAKE 1 TABLET DAILY, Disp: 90 tablet, Rfl: 3    glucose blood test strip, Use to test sugars four times daily CONTOUR TEST STRIP, Disp: 100 each, Rfl: 5    OneTouch Delica Lancets 33G MISC, Use to test sugars three times daily. E11.9, Disp: 100 each, Rfl: 5    Probiotic Product (PROBIOTIC ADVANCED PO), Take by mouth, Disp: , Rfl:         Brandon Corado MD

## 2024-08-20 ENCOUNTER — PROCEDURE VISIT (OUTPATIENT)
Dept: UROLOGY | Facility: CLINIC | Age: 74
End: 2024-08-20
Payer: MEDICARE

## 2024-08-20 VITALS
HEIGHT: 66 IN | WEIGHT: 264 LBS | DIASTOLIC BLOOD PRESSURE: 80 MMHG | OXYGEN SATURATION: 96 % | SYSTOLIC BLOOD PRESSURE: 140 MMHG | HEART RATE: 76 BPM | BODY MASS INDEX: 42.43 KG/M2

## 2024-08-20 DIAGNOSIS — N40.1 BENIGN PROSTATIC HYPERPLASIA WITH NOCTURIA: ICD-10-CM

## 2024-08-20 DIAGNOSIS — R31.0 GROSS HEMATURIA: Primary | ICD-10-CM

## 2024-08-20 DIAGNOSIS — R35.1 BENIGN PROSTATIC HYPERPLASIA WITH NOCTURIA: ICD-10-CM

## 2024-08-20 DIAGNOSIS — N39.41 URGE INCONTINENCE: ICD-10-CM

## 2024-08-20 DIAGNOSIS — C64.9 RENAL CELL CARCINOMA, UNSPECIFIED LATERALITY (HCC): ICD-10-CM

## 2024-08-20 LAB
SL AMB  POCT GLUCOSE, UA: NEGATIVE
SL AMB LEUKOCYTE ESTERASE,UA: NORMAL
SL AMB POCT BILIRUBIN,UA: NEGATIVE
SL AMB POCT BLOOD,UA: NORMAL
SL AMB POCT CLARITY,UA: CLEAR
SL AMB POCT COLOR,UA: YELLOW
SL AMB POCT KETONES,UA: NEGATIVE
SL AMB POCT NITRITE,UA: POSITIVE
SL AMB POCT PH,UA: 5.5
SL AMB POCT SPECIFIC GRAVITY,UA: 1.02
SL AMB POCT URINE PROTEIN: NEGATIVE
SL AMB POCT UROBILINOGEN: 0.2

## 2024-08-20 PROCEDURE — 52000 CYSTOURETHROSCOPY: CPT | Performed by: UROLOGY

## 2024-08-20 PROCEDURE — 81002 URINALYSIS NONAUTO W/O SCOPE: CPT | Performed by: UROLOGY

## 2024-08-20 PROCEDURE — 99213 OFFICE O/P EST LOW 20 MIN: CPT | Performed by: UROLOGY

## 2024-08-20 RX ORDER — CEPHALEXIN 500 MG/1
500 CAPSULE ORAL ONCE
Qty: 1 CAPSULE | Refills: 0 | Status: SHIPPED | OUTPATIENT
Start: 2024-08-20 | End: 2024-08-20

## 2024-08-20 NOTE — PATIENT INSTRUCTIONS
You have just undergone cystoscopy of the bladder.  Expect to see some blood in the urine, which should clear up within 24 to 48 hours.  You also may experience burning urgency and frequency of urination which is normal.  Call for fever greater than 101.5 degrees, severe pain not relieved by over-the-counter medicines, or inability to urinate.  You may resume all normal activities.  For irritative symptoms, you can purchase over-the-counter remedies such as cystek or Azo, or any other preparations advertised in the pharmacy for bladder symptoms.  I will call in Pyridium which is a prescription strength medication for bladder irritability upon your request.     Today we discussed further treatments for BPH, large amount of the prostate and that is the source of your bleeding.  We discussed finasteride and dutasteride as possible medicines to shrink the prostate and prevent future bleeding.  Let me know if he would like to start these medications and I will call this in upon your request.    Follow-up in 6 months with a kidney and bladder ultrasound and we will also check to see how well your bladder is emptying.

## 2024-08-20 NOTE — LETTER
2024     BRANDI Mayer  [address] [place]  [address]  [facility] [address]    Patient: Hay Dela Cruz   YOB: 1950   Date of Visit: 2024       Dear Ms Uribe-    Thank you for referring Hay Dela Cruz to me for evaluation. Below are my notes for this consultation.    If you have questions, please do not hesitate to call me. I look forward to following your patient along with you.         Sincerely,        Brandon Corado MD        CC: No Recipients    Brandon Corado MD  2024  8:40 AM  Sign when Signing Visit  UROLOGY PROGRESS NOTE   [facility]  [address] [place] [address]  [address]  [place] [address]  [phone]  Fax:[phone]  [email or web address]      NAME: Hay Dela Cruz  AGE: 74 y.o. SEX: male  : 1950   MRN: [id number]    DATE: 2024  TIME: 8:35 AM    Assessment and Plan:    Gross hematuria due to BPH with obstruction with trilobar hypertrophy and friable vessels.  He remains on tamsulosin.  We discussed finasteride or dutasteride to his regimen.  We also discussed the benefits and the potential side effects and at this time he wishes to wait on that.  We will call in this prescription upon his request.    Renal cell carcinoma cryoablation [id number] 6 months with a kidney bladder ultrasound with PVR.               Chief Complaint   No chief complaint on file.      History of Present Illness   Last seen by me 2021 for nocturia and urge incontinence but no major frequency.  It sounded like overactive bladder.  She was seen by Mr. Ulloa 2024 with gross hematuria.  CT scan unremarkable.  Treated for suspected UTI and was prescribed Keflex.  Urine culture was negative for infection.  He has been set up for cystoscopy.  He has a history of right renal cell carcinoma status post cryoablation 2021.  Surveillance CT imaging showed no metastatic disease in the abdomen stable cryoablation zone in the right kidney.  He did  have bilateral renal cysts consistent with proteinaceous versus hemorrhagic cyst as possible gross hematuria could have been from one of the cysts.  No stones.  Urine cytology - 2/13/2024.  His only voiding symptoms are urgency and some urge incontinence if he cannot make to the bathroom in time.  He does drink a lot of water.  He does also have postvoid dribbling.  Nocturia 2-3 times a night.  PSA 0.54 April 17, 2024 which is low and stable for him.     Cystoscopy     Date/Time  8/20/2024 8:30 AM     Performed by  Brandon Corado MD   Authorized by  BRANDI Herman     Universal Protocol:  Consent: Verbal consent obtained. Written consent obtained.      Procedure Details:  Procedure type: cystoscopy    Additional Procedure Details: Cystoscopy Procedure Note        Pre-operative Diagnosis: Gross hematuria    Post-operative Diagnosis: Same, due to BPH with friable vessels with median lobe and prostatic intrusion    Procedure: Flexible cystoscopy    Surgeon: Brandon Corado MD    Anesthesia: 1% Xylocaine per urethra    EBL: Minimal    Complications: none    Procedure Details   The risks, benefits, complications, treatment options, and expected outcomes were discussed with the patient. The patient concurred with the proposed plan, giving informed consent.    Cystoscopy was performed today under local anesthesia, using sterile technique. The patient was placed in the supine position, prepped with Betadine, and draped in the usual sterile fashion. The flexible cystocope was used to inspect both the urethra and bladder    Findings:  Urethra: Stricture.  Trilobar hypertrophy with large median lobe and friable vessels.    Bladder: 2+ trabeculated with multiple small cellules.  And there were no stones tumors or other lesions.  The orifices were orthotopic and intact.           Specimens: none                 Complications:  None           Disposition: To home            Condition:  Stable              The following portions  "of the patient's history were reviewed and updated as appropriate: allergies, current medications, past family history, past medical history, past social history, past surgical history and problem list.  Past Medical History:   Diagnosis Date   • Carotid artery occlusion    • Disease of thyroid gland    • Hypertension    • Mixed hyperlipidemia    • Peripheral vascular disease (HCC)    • Renal mass      Past Surgical History:   Procedure Laterality Date   • APPENDECTOMY     • CARPAL TUNNEL RELEASE     • CHOLECYSTECTOMY     • COLONOSCOPY  2014 12 polyps    • IR CRYOABLATION  10/20/2021   • JOINT REPLACEMENT Left     TKR   • JOINT REPLACEMENT Right     Total knee replacement    • SHOULDER SURGERY Left     repair      shoulder  Review of Systems   Review of Systems   Genitourinary:  Positive for hematuria and urgency. Negative for difficulty urinating.       Active Problem List     Patient Active Problem List   Diagnosis   • Encounter for diabetic foot exam ([facility])   • Morbid obesity with BMI of 40.0-44.9, adult ([facility])   • Essential hypertension   • Lung nodule seen on imaging study   • Mixed hyperlipidemia   • Bilateral renal masses   • Edema   • Type 2 diabetes mellitus with microalbuminuria, without long-term current use of insulin ([facility])   • Gross hematuria   • Renal cell carcinoma (HCC)   • Benign prostatic hyperplasia with nocturia   • Heart murmur   • Urge incontinence       Objective   /80   Pulse 76   Ht 5' 6\" (1.676 m)   Wt 120 kg (264 lb)   SpO2 96%   BMI 42.61 kg/m²     Physical Exam  Vitals reviewed.   Constitutional:       Appearance: Normal appearance.   HENT:      Head: Normocephalic and atraumatic.   Eyes:      Extraocular Movements: Extraocular movements intact.   Pulmonary:      Effort: Pulmonary effort is normal.   Genitourinary:     Penis: Normal.    Musculoskeletal:         General: Normal range of motion.      Cervical back: Normal range of motion.      Right lower leg: No edema.      " Left lower leg: No edema.   Skin:     Coloration: Skin is not jaundiced or pale.   Neurological:      General: No focal deficit present.      Mental Status: He is alert and oriented to person, place, and time. Mental status is at baseline.   Psychiatric:         Mood and Affect: Mood normal.         Behavior: Behavior normal.         Thought Content: Thought content normal.         Judgment: Judgment normal.             Current Medications     Current Outpatient Medications:   •  amLODIPine (NORVASC) 10 mg tablet, TAKE 1 TABLET DAILY, Disp: 90 tablet, Rfl: 3  •  NELLY ASPIRIN EC LOW DOSE PO, Take 81 mg by mouth daily, Disp: , Rfl:   •  cephalexin (KEFLEX) 500 mg capsule, Take 1 capsule (500 mg total) by mouth 1 (one) time for 1 dose Take after procedure, Disp: 1 capsule, Rfl: 0  •  ezetimibe (ZETIA) 10 mg tablet, TAKE 1 TABLET DAILY, Disp: 90 tablet, Rfl: 3  •  hydrochlorothiazide (HYDRODIURIL) 12.5 mg tablet, TAKE 1 TABLET DAILY, Disp: 90 tablet, Rfl: 3  •  Klor-Con M20 20 MEQ tablet, TAKE 1 TABLET DAILY, Disp: 90 tablet, Rfl: 3  •  metFORMIN (GLUCOPHAGE) 500 mg tablet, TAKE 2 TABLETS IN THE MORNING AND 1 TABLET AT NIGHT, Disp: 270 tablet, Rfl: 3  •  Multiple Vitamins-Minerals (CENTRUM SILVER 50+MEN PO), Centrum Silver, Disp: , Rfl:   •  mupirocin (BACTROBAN) 2 % ointment, Apply topically daily for 7 days To open wound of left second toe, Disp: 22 g, Rfl: 0  •  pioglitazone (ACTOS) 15 mg tablet, TAKE 1 TABLET DAILY, Disp: 90 tablet, Rfl: 3  •  rosuvastatin (CRESTOR) 5 mg tablet, TAKE 1 TABLET DAILY, Disp: 90 tablet, Rfl: 3  •  tamsulosin (FLOMAX) 0.4 mg, TAKE 1 CAPSULE TWICE A DAY, Disp: 180 capsule, Rfl: 1  •  tirzepatide (Mounjaro) 2.5 MG/0.5ML, INJECT 1/2 (ONE-HALF) ML SUBCUTANEOUSLY  ONCE A WEEK, Disp: 8 mL, Rfl: 0  •  valsartan (DIOVAN) 160 mg tablet, TAKE 1 TABLET DAILY, Disp: 90 tablet, Rfl: 3  •  glucose blood test strip, Use to test sugars four times daily CONTOUR TEST STRIP, Disp: 100 each, Rfl: 5  •   OneTouch Delica Lancets 33G MISC, Use to test sugars three times daily. E11.9, Disp: 100 each, Rfl: 5  •  Probiotic Product (PROBIOTIC ADVANCED PO), Take by mouth, Disp: , Rfl:         Brandon Corado MD

## 2024-08-22 DIAGNOSIS — E11.29 TYPE 2 DIABETES MELLITUS WITH MICROALBUMINURIA, WITHOUT LONG-TERM CURRENT USE OF INSULIN (HCC): Primary | ICD-10-CM

## 2024-08-22 DIAGNOSIS — R80.9 TYPE 2 DIABETES MELLITUS WITH MICROALBUMINURIA, WITHOUT LONG-TERM CURRENT USE OF INSULIN (HCC): Primary | ICD-10-CM

## 2024-08-22 NOTE — TELEPHONE ENCOUNTER
Patient's wife came into office today. Hay is ready to have his Mounjaro upped to the next dose. Is currently 2.5. Is asking to go up to 5. [facility] [place].

## 2024-08-28 ENCOUNTER — OFFICE VISIT (OUTPATIENT)
Dept: FAMILY MEDICINE CLINIC | Facility: CLINIC | Age: 74
End: 2024-08-28
Payer: MEDICARE

## 2024-08-28 ENCOUNTER — APPOINTMENT (OUTPATIENT)
Dept: RADIOLOGY | Facility: CLINIC | Age: 74
End: 2024-08-28
Payer: MEDICARE

## 2024-08-28 VITALS
HEIGHT: 66 IN | BODY MASS INDEX: 42.43 KG/M2 | HEART RATE: 77 BPM | DIASTOLIC BLOOD PRESSURE: 70 MMHG | RESPIRATION RATE: 16 BRPM | WEIGHT: 264 LBS | SYSTOLIC BLOOD PRESSURE: 132 MMHG | OXYGEN SATURATION: 98 % | TEMPERATURE: 97.6 F

## 2024-08-28 DIAGNOSIS — R80.9 TYPE 2 DIABETES MELLITUS WITH MICROALBUMINURIA, WITHOUT LONG-TERM CURRENT USE OF INSULIN (HCC): ICD-10-CM

## 2024-08-28 DIAGNOSIS — M79.671 RIGHT FOOT PAIN: Primary | ICD-10-CM

## 2024-08-28 DIAGNOSIS — E11.29 TYPE 2 DIABETES MELLITUS WITH MICROALBUMINURIA, WITHOUT LONG-TERM CURRENT USE OF INSULIN (HCC): ICD-10-CM

## 2024-08-28 DIAGNOSIS — M79.671 RIGHT FOOT PAIN: ICD-10-CM

## 2024-08-28 DIAGNOSIS — B35.3 TINEA PEDIS OF RIGHT FOOT: ICD-10-CM

## 2024-08-28 PROCEDURE — G2211 COMPLEX E/M VISIT ADD ON: HCPCS | Performed by: INTERNAL MEDICINE

## 2024-08-28 PROCEDURE — 99213 OFFICE O/P EST LOW 20 MIN: CPT | Performed by: INTERNAL MEDICINE

## 2024-08-28 PROCEDURE — 73630 X-RAY EXAM OF FOOT: CPT

## 2024-08-28 NOTE — PROGRESS NOTES
"Ambulatory Visit  Name: Hay Dela Cruz      : 1950      MRN: [id number]  Encounter Provider: Diana Banks MD  Encounter Date: 2024   Encounter department: [facility]    Assessment & Plan   1. Right foot pain  -suspect diabetic neuropathy vs nerve impingement, get xray today, follow up with podiatrist. Discussed possible NCS and gabapentin if symptoms worsen  -     XR foot 3+ vw right; Future; Expected date: 2024  2. Type 2 diabetes mellitus with microalbuminuria, without long-term current use of insulin (HCC)  -     XR foot 3+ vw right; Future; Expected date: 2024  3. Tinea pedis of right foot       History of Present Illness     75 yo male with history of diabetes here for acute visit. Pt reports right foot pain for past 2 months, now getting worse. Mostly in toes and feels like burning. Follows with Dr. Ortiz regularly. No trauma or injury, worse with standing and weight bearing. History of diabetes for several years, no paresthesia in left foot or hands.         Review of Systems   Constitutional:  Negative for appetite change, fatigue and fever.   Musculoskeletal:  Positive for arthralgias.   Skin:  Positive for rash. Negative for color change and wound.   Neurological:  Positive for numbness.       Objective     /70   Pulse 77   Temp 97.6 °F (36.4 °C)   Resp 16   Ht 5' 6\" (1.676 m)   Wt 120 kg (264 lb)   SpO2 98%   BMI 42.61 kg/m²     Physical Exam  Vitals reviewed.   Constitutional:       General: He is not in acute distress.     Appearance: He is obese.   Cardiovascular:      Pulses:           Dorsalis pedis pulses are 1+ on the right side.        Posterior tibial pulses are 1+ on the right side.   Feet:      Right foot:      Skin integrity: Callus and dry skin present. No ulcer, erythema or warmth.      Toenail Condition: Right toenails are abnormally thick. Fungal disease present.  Neurological:      General: No focal deficit present. "      Mental Status: He is alert.   Psychiatric:         Mood and Affect: Mood and affect normal.         Behavior: Behavior normal. Behavior is cooperative.       Administrative Statements

## 2024-09-03 ENCOUNTER — APPOINTMENT (OUTPATIENT)
Dept: RADIOLOGY | Facility: CLINIC | Age: 74
End: 2024-09-03
Payer: MEDICARE

## 2024-09-03 ENCOUNTER — TELEPHONE (OUTPATIENT)
Age: 74
End: 2024-09-03

## 2024-09-03 DIAGNOSIS — R20.9 DISTURBANCE OF SKIN SENSATION: ICD-10-CM

## 2024-09-03 DIAGNOSIS — E11.42 DIABETIC POLYNEUROPATHY ASSOCIATED WITH TYPE 2 DIABETES MELLITUS (HCC): ICD-10-CM

## 2024-09-03 PROCEDURE — 72110 X-RAY EXAM L-2 SPINE 4/>VWS: CPT

## 2024-09-03 NOTE — TELEPHONE ENCOUNTER
Of course, please send him the results - this message should be put through as an issue- delay in patient care- office should have been called by POD, not a patient message sent, this was 2 hours ago

## 2024-09-03 NOTE — TELEPHONE ENCOUNTER
Dr. Ortiz's (foot doctor) office called asking for the recent x-ray results for patient.     Patient is at the office now. Please send them asap.     Fax [phone] Attn: Katlin

## 2024-09-09 ENCOUNTER — HOSPITAL ENCOUNTER (OUTPATIENT)
Dept: CT IMAGING | Facility: HOSPITAL | Age: 74
Discharge: HOME | End: 2024-09-09
Payer: MEDICARE

## 2024-09-09 DIAGNOSIS — C64.9 RENAL CELL CARCINOMA, UNSPECIFIED LATERALITY (HCC): ICD-10-CM

## 2024-09-09 DIAGNOSIS — R91.1 PULMONARY NODULE: ICD-10-CM

## 2024-09-09 PROCEDURE — 71260 CT THORAX DX C+: CPT

## 2024-09-09 PROCEDURE — 74178 CT ABD&PLV WO CNTR FLWD CNTR: CPT

## 2024-09-09 PROCEDURE — G1004 CDSM NDSC: HCPCS

## 2024-09-09 RX ADMIN — IOHEXOL 100 ML: 350 INJECTION, SOLUTION INTRAVENOUS at 07:56

## 2024-09-09 NOTE — LETTER
[facility]  [address] [address]      September 19, 2024    MRN: [id number]     Phone: [phone]     Dear Mr. Jonesmicky,    You recently had a(n) Cat Scan performed on 9/9/2024 at  [facility] that was requested by BRANDI Herman. The study was reviewed by a radiologist, which is a physician who specializes in medical imaging. The radiologist issued a report describing his or her findings. In that report there was a finding that the radiologist felt warranted further discussion with your health care provider and that discussion would be beneficial to you.      The results were sent to BRANDI Herman on 09/11/2024 10:00 AM. We recommend that you contact BRANDI Herman at [phone] or set up an appointment to discuss the results of the imaging test. If you have already heard from BRANDI Herman regarding the results of your study, you can disregard this letter.     This letter is not meant to alarm you, but intended to encourage you to follow-up on your results with the provider that sent you for the imaging study. In addition, we have enclosed answers to frequently asked questions by other patients who have also received a letter to review results with their health care provider (see page two).      Thank you for choosing [facility] for your medical imaging needs.                                                                                                                                                        FREQUENTLY ASKED QUESTIONS    Why am I receiving this letter?  [place] State Law requires us to notify patients who have findings on imaging exams that may require more testing or follow-up with a health professional within the next 3 months.        How serious is the finding on the imaging test?  This letter is sent to all patients who may need follow-up or more testing within the next 3  months.  Receiving this letter does not necessarily mean you have a life-threatening imaging finding or disease.  Recommendations in the radiologist’s imaging report are general in nature and it is up to your healthcare provider to say whether those recommendations make sense for your situation.  You are strongly encouraged to talk to your health care provider about the results and ask whether additional steps need to be taken.    Where can I get a copy of the final report for my recent radiology exam?  To get a full copy of the report you can access your records online at [email or web address] or please contact [facility]’s Medical Records Department at [phone] Monday through Friday between 8 am and 6 pm.         What do I need to do now?           Please contact your health care provider who requested the imaging study to discuss what further actions (if any) are needed.  You may have already heard from (your ordering provider) in regard to this test in which case you can disregard this letter.        NOTICE IN ACCORDANCE WITH THE [place] STATE “PATIENT TEST RESULT INFORMATION ACT OF 2018”    You are receiving this notice as a result of a determination by your diagnostic imaging service that further discussions of your test results are warranted and would be beneficial to you.    The complete results of your test or tests have been or will be sent to the health care practitioner that ordered the test or tests. It is recommended that you contact your health care practitioner to discuss your results as soon as possible.

## 2024-09-11 DIAGNOSIS — N28.9 RENAL LESION: Primary | ICD-10-CM

## 2024-09-11 NOTE — RESULT ENCOUNTER NOTE
I spoke with patient over the phone and reviewed his recent CT results.  There is a questionable lesion within the right lower pole not definitively characterized on CT.  Radiology is recommending an MRI for further evaluation.  Otherwise no acute findings.  He has stable pulmonary nodules and there is no evidence of recurrence in the previous cryoablation site in the right upper pole.

## 2024-09-12 ENCOUNTER — TELEPHONE (OUTPATIENT)
Dept: FAMILY MEDICINE CLINIC | Facility: CLINIC | Age: 74
End: 2024-09-12

## 2024-09-12 NOTE — TELEPHONE ENCOUNTER
Hay only has one shot left of the Mounjaro 5 mg. Is wondering what is going on with the prior auth. Is very concerned?    Please call Marilou [phone]      Please advise    [facility]

## 2024-09-12 NOTE — TELEPHONE ENCOUNTER
On chart review PA was submitted on 8/13 and not needed as drug was covered under plan. Should I see if pt wants to go up in dosing or stay on the same?

## 2024-09-20 ENCOUNTER — HOSPITAL ENCOUNTER (OUTPATIENT)
Dept: MRI IMAGING | Facility: HOSPITAL | Age: 74
End: 2024-09-20
Payer: MEDICARE

## 2024-09-20 DIAGNOSIS — N28.9 RENAL LESION: ICD-10-CM

## 2024-09-20 PROCEDURE — 74183 MRI ABD W/O CNTR FLWD CNTR: CPT

## 2024-09-20 PROCEDURE — A9585 GADOBUTROL INJECTION: HCPCS

## 2024-09-20 RX ORDER — GADOBUTROL 604.72 MG/ML
12 INJECTION INTRAVENOUS
Status: COMPLETED | OUTPATIENT
Start: 2024-09-20 | End: 2024-09-20

## 2024-09-20 RX ADMIN — GADOBUTROL 12 ML: 604.72 INJECTION INTRAVENOUS at 14:44

## 2024-09-20 NOTE — LETTER
[facility]  [address] [place] [address]      September 27, 2024    MRN: [id number]     Phone: [phone]     Dear Mr. Cabezasyannick,    You recently had a(n) MRI performed on 9/20/2024 at  [facility] that was requested by BRANDI Herman. The study was reviewed by a radiologist, which is a physician who specializes in medical imaging. The radiologist issued a report describing his or her findings. In that report there was a finding that the radiologist felt warranted further discussion with your health care provider and that discussion would be beneficial to you.      The results were sent to BRANDI Herman on 09/24/2024  8:39 AM. We recommend that you contact BRANDI Herman at [phone] or set up an appointment to discuss the results of the imaging test. If you have already heard from BRANDI Herman regarding the results of your study, you can disregard this letter.     This letter is not meant to alarm you, but intended to encourage you to follow-up on your results with the provider that sent you for the imaging study. In addition, we have enclosed answers to frequently asked questions by other patients who have also received a letter to review results with their health care provider (see page two).      Thank you for choosing [facility] for your medical imaging needs.                                                                                                                                                        FREQUENTLY ASKED QUESTIONS    Why am I receiving this letter?  [place] State Law requires us to notify patients who have findings on imaging exams that may require more testing or follow-up with a health professional within the next 3 months.        How serious is the finding on the imaging test?  This letter is sent to all patients who may need follow-up or more testing within the next 3 months.   Receiving this letter does not necessarily mean you have a life-threatening imaging finding or disease.  Recommendations in the radiologist’s imaging report are general in nature and it is up to your healthcare provider to say whether those recommendations make sense for your situation.  You are strongly encouraged to talk to your health care provider about the results and ask whether additional steps need to be taken.    Where can I get a copy of the final report for my recent radiology exam?  To get a full copy of the report you can access your records online at [email or web address] or please contact [facility]’s Medical Records Department at [phone] Monday through Friday between 8 am and 6 pm.         What do I need to do now?           Please contact your health care provider who requested the imaging study to discuss what further actions (if any) are needed.  You may have already heard from (your ordering provider) in regard to this test in which case you can disregard this letter.        NOTICE IN ACCORDANCE WITH THE [place] “PATIENT TEST RESULT INFORMATION ACT OF 2018”    You are receiving this notice as a result of a determination by your diagnostic imaging service that further discussions of your test results are warranted and would be beneficial to you.    The complete results of your test or tests have been or will be sent to the health care practitioner that ordered the test or tests. It is recommended that you contact your health care practitioner to discuss your results as soon as possible.

## 2024-09-24 ENCOUNTER — TELEPHONE (OUTPATIENT)
Dept: FAMILY MEDICINE CLINIC | Facility: CLINIC | Age: 74
End: 2024-09-24

## 2024-09-24 DIAGNOSIS — R09.81 NASAL CONGESTION: ICD-10-CM

## 2024-09-24 DIAGNOSIS — R05.1 ACUTE COUGH: Primary | ICD-10-CM

## 2024-09-24 RX ORDER — AZITHROMYCIN 250 MG/1
TABLET, FILM COATED ORAL
Qty: 6 TABLET | Refills: 0 | Status: SHIPPED | OUTPATIENT
Start: 2024-09-24 | End: 2024-09-29

## 2024-09-24 NOTE — TELEPHONE ENCOUNTER
He is sick and has cough mucus,runny nose sinus congestion , no fever. On set 4 day and leaving for vacation in 2 days. Asking to be seen      Pharmacy [facility]      Phone: [phone]    Please advise    Would like a call back today

## 2024-09-24 NOTE — TELEPHONE ENCOUNTER
You can offer Zpak or augmentin- depending on allergies and patient preference, please send to me for approval

## 2024-09-25 ENCOUNTER — TELEPHONE (OUTPATIENT)
Age: 74
End: 2024-09-25

## 2024-09-25 ENCOUNTER — TELEPHONE (OUTPATIENT)
Dept: UROLOGY | Facility: CLINIC | Age: 74
End: 2024-09-25

## 2024-09-25 DIAGNOSIS — C64.1 RENAL CELL CARCINOMA OF RIGHT KIDNEY (HCC): Primary | ICD-10-CM

## 2024-09-25 NOTE — TELEPHONE ENCOUNTER
Patient and wife called in. Patient stating he has noticed for a few months that his urine smells like bologna. States his urine is clear yellow, denies burning, fever, hematuria, any pain. States he has had this odor for over 2 months. Urine was dipped in office 8/20 with no abnormalities. Asked patient if on any new meds. Patient states started taking Mounjaro around same time odor started. Advised patient that some patients do experience urine odor with that type of med. Asked patient about daily water intake, patient states he does not drink very much water, wife in background agreeing. Advised 64oz fluid intake daily. Advised hydration.

## 2024-09-25 NOTE — TELEPHONE ENCOUNTER
----- Message from Brandon Corado MD sent at 9/25/2024  4:10 PM EDT -----  I called the patient-please set him up for MRI of the kidneys/abdomen as I ordered in 3 months.  Schedule him an appointment with me or Joaquin at that time.

## 2024-09-25 NOTE — RESULT ENCOUNTER NOTE
Ms Uribe- GWEN only.  I reviewed his CAT scans and MRIs from June up until now.  MRI of the abdomen shows 1.9 cm right posterior medial lesion which is enhancing and suspicious for renal cell carcinoma.  He is status post right upper pole renal cell carcinoma cryoablation in that site looks okay.  Also informed him that he has some small pancreatic cysts which we will be watching with serial imaging from now on for this lesion.  We discussed active surveillance for this lesion, repeat cryotherapy, or partial nephrectomy or radical nephrectomy.  He wishes to do active surveillance and we are setting him up for 3-month visit with an MRI before.

## 2024-09-26 DIAGNOSIS — C64.9 RENAL CELL CARCINOMA, UNSPECIFIED LATERALITY (HCC): Primary | ICD-10-CM

## 2024-09-26 NOTE — TELEPHONE ENCOUNTER
Called and spoke with patient and his spouse Marilou. Patient was scheduled for an appointment with BRANDI Nuñez on 12/31 @ 9:20 am in the [place] office. He is aware to have an MRI completed 2 weeks prior to appointment with a BMP prior to MRI. Central scheduling phone number was provided and patient will call to schedule imaging. 10/18/24 appointment that was scheduled from prior has been cancelled at this time.

## 2024-10-14 DIAGNOSIS — E11.9 TYPE 2 DIABETES MELLITUS WITHOUT COMPLICATION, WITHOUT LONG-TERM CURRENT USE OF INSULIN (HCC): ICD-10-CM

## 2024-10-16 ENCOUNTER — OFFICE VISIT (OUTPATIENT)
Dept: OBGYN CLINIC | Facility: MEDICAL CENTER | Age: 74
End: 2024-10-16
Payer: MEDICARE

## 2024-10-16 VITALS
HEIGHT: 66 IN | BODY MASS INDEX: 42.43 KG/M2 | DIASTOLIC BLOOD PRESSURE: 76 MMHG | WEIGHT: 264 LBS | HEART RATE: 68 BPM | SYSTOLIC BLOOD PRESSURE: 148 MMHG

## 2024-10-16 DIAGNOSIS — M79.671 PAIN IN RIGHT FOOT: Primary | ICD-10-CM

## 2024-10-16 DIAGNOSIS — M19.071 PRIMARY OSTEOARTHRITIS OF RIGHT FOOT: ICD-10-CM

## 2024-10-16 PROCEDURE — 99203 OFFICE O/P NEW LOW 30 MIN: CPT | Performed by: EMERGENCY MEDICINE

## 2024-10-16 NOTE — PROGRESS NOTES
Assessment/Plan:    Diagnoses and all orders for this visit:    Pain in right foot    Primary osteoarthritis of right foot    Hay's right foot pain has been improved as of late.  Will check with PCP to see if he is able to take NSAIDs.  He does treat for diabetes has not been checking his blood sugars and his last hemoglobin A1c was 6.8 in April but had been trending up.  At this time I do not believe his foot pain is arising from the lumbar spine and have recommended he follow back up with the podiatrist to undergo the recommended injections that he had previously discussed    No follow-ups on file.      Subjective:   Patient ID: Hay Dela Cruz is a 74 y.o. male.    NP presents for several months of Right dorsal foot burning pain with N/T typically worse with WB, tends to walk on his lateral foot to reduce pain, does have pain when bending foot and experiences cracking.  Evaluated by podiatry who dx DJD however believes it could be coming from his back but did apparently offer an injection.          Review of Systems    The following portions of the patient's chart were reviewed and updated as appropriate:   Allergy:  No Known Allergies    Medications:    Current Outpatient Medications:     amLODIPine (NORVASC) 10 mg tablet, TAKE 1 TABLET DAILY, Disp: 90 tablet, Rfl: 3    NELLY ASPIRIN EC LOW DOSE PO, Take 81 mg by mouth daily, Disp: , Rfl:     ezetimibe (ZETIA) 10 mg tablet, TAKE 1 TABLET DAILY, Disp: 90 tablet, Rfl: 3    glucose blood test strip, Use to test sugars four times daily CONTOUR TEST STRIP, Disp: 100 each, Rfl: 5    hydrochlorothiazide (HYDRODIURIL) 12.5 mg tablet, TAKE 1 TABLET DAILY, Disp: 90 tablet, Rfl: 3    Klor-Con M20 20 MEQ tablet, TAKE 1 TABLET DAILY, Disp: 90 tablet, Rfl: 3    metFORMIN (GLUCOPHAGE) 500 mg tablet, TAKE 2 TABLETS IN THE MORNING AND 1 TABLET AT NIGHT, Disp: 270 tablet, Rfl: 3    Multiple Vitamins-Minerals (CENTRUM SILVER 50+MEN PO), Centrum Silver, Disp: , Rfl:      "OneTouch Delica Lancets 33G MISC, Use to test sugars three times daily. E11.9, Disp: 100 each, Rfl: 5    pioglitazone (ACTOS) 15 mg tablet, TAKE 1 TABLET DAILY, Disp: 90 tablet, Rfl: 3    Probiotic Product (PROBIOTIC ADVANCED PO), Take by mouth, Disp: , Rfl:     rosuvastatin (CRESTOR) 5 mg tablet, TAKE 1 TABLET DAILY, Disp: 90 tablet, Rfl: 3    tamsulosin (FLOMAX) 0.4 mg, TAKE 1 CAPSULE TWICE A DAY, Disp: 180 capsule, Rfl: 1    tirzepatide (Mounjaro) 5 MG/0.5ML, Inject 0.5 mL (5 mg total) under the skin every 7 days, Disp: 2 mL, Rfl: 0    valsartan (DIOVAN) 160 mg tablet, TAKE 1 TABLET DAILY, Disp: 90 tablet, Rfl: 3    mupirocin (BACTROBAN) 2 % ointment, Apply topically daily for 7 days To open wound of left second toe, Disp: 22 g, Rfl: 0    Patient Active Problem List   Diagnosis    Encounter for diabetic foot exam (HCC)    Morbid obesity with BMI of 40.0-44.9, adult (HCC)    Essential hypertension    Lung nodule seen on imaging study    Mixed hyperlipidemia    Bilateral renal masses    Edema    Type 2 diabetes mellitus with microalbuminuria, without long-term current use of insulin (HCC)    Gross hematuria    Renal cell carcinoma (HCC)    Benign prostatic hyperplasia with nocturia    Heart murmur    Urge incontinence       Objective:  /76   Pulse 68   Ht 5' 6\" (1.676 m)   Wt 120 kg (264 lb)   BMI 42.61 kg/m²     Right Ankle Exam     Tenderness   The patient is experiencing no tenderness.  Swelling: mild    Other   Erythema: absent  Sensation: normal  Pulse: present     Comments:  No pain with compression of forefoot or resisted plantarflexion   High arch          Physical Exam      Neurologic Exam    Procedures    I have personally reviewed pertinent films in PACS. and I have personally reviewed the written report of the pertinent studies. Xrays Right foot showing DJD            Past Medical History:   Diagnosis Date    Carotid artery occlusion     Disease of thyroid gland     Hypertension     Mixed " hyperlipidemia     Peripheral vascular disease (HCC)     Renal mass        Past Surgical History:   Procedure Laterality Date    APPENDECTOMY      CARPAL TUNNEL RELEASE      CHOLECYSTECTOMY      COLONOSCOPY  2014    12 polyps     IR CRYOABLATION  10/20/2021    JOINT REPLACEMENT Left     TKR    JOINT REPLACEMENT Right     Total knee replacement     SHOULDER SURGERY Left     repair        Social History     Socioeconomic History    Marital status: /Civil Union     Spouse name: Not on file    Number of children: Not on file    Years of education: Not on file    Highest education level: Not on file   Occupational History    Occupation: [employer] in steady days    Tobacco Use    Smoking status: Never    Smokeless tobacco: Never    Tobacco comments:     Former smoker - As per [facility]    Vaping Use    Vaping status: Never Used   Substance and Sexual Activity    Alcohol use: Yes     Comment: occasionally    Drug use: Never    Sexual activity: Not Currently   Other Topics Concern    Not on file   Social History Narrative    Consumes on average 3 cups of regular coffee per day      Social Determinants of Health     Financial Resource Strain: Low Risk  (11/2/2023)    Overall Financial Resource Strain (CARDIA)     Difficulty of Paying Living Expenses: Not very hard   Food Insecurity: Not on file   Transportation Needs: No Transportation Needs (11/2/2023)    PRAPARE - Transportation     Lack of Transportation (Medical): No     Lack of Transportation (Non-Medical): No   Physical Activity: Not on file   Stress: Not on file   Social Connections: Not on file   Intimate Partner Violence: Not on file   Housing Stability: Not on file       Family History   Problem Relation Age of Onset    Breast cancer Mother     Heart disease Father     Stroke Father     Breast cancer Sister     Sudden death Brother         MVA    Obesity Brother     Diabetes Brother     Obesity Brother

## 2024-10-17 DIAGNOSIS — M79.671 PAIN IN RIGHT FOOT: ICD-10-CM

## 2024-10-17 DIAGNOSIS — M19.071 PRIMARY OSTEOARTHRITIS OF RIGHT FOOT: Primary | ICD-10-CM

## 2024-10-17 DIAGNOSIS — E11.9 TYPE 2 DIABETES MELLITUS WITHOUT COMPLICATION, WITHOUT LONG-TERM CURRENT USE OF INSULIN (HCC): Primary | ICD-10-CM

## 2024-10-17 RX ORDER — METHYLPREDNISOLONE 4 MG/1
TABLET ORAL
Qty: 1 EACH | Refills: 0 | Status: SHIPPED | OUTPATIENT
Start: 2024-10-17

## 2024-10-21 DIAGNOSIS — I10 ESSENTIAL HYPERTENSION: ICD-10-CM

## 2024-10-22 RX ORDER — POTASSIUM CHLORIDE 1500 MG/1
20 TABLET, EXTENDED RELEASE ORAL DAILY
Qty: 90 TABLET | Refills: 3 | Status: SHIPPED | OUTPATIENT
Start: 2024-10-22

## 2024-11-08 ENCOUNTER — OFFICE VISIT (OUTPATIENT)
Dept: FAMILY MEDICINE CLINIC | Facility: CLINIC | Age: 74
End: 2024-11-08
Payer: MEDICARE

## 2024-11-08 ENCOUNTER — APPOINTMENT (OUTPATIENT)
Dept: LAB | Facility: CLINIC | Age: 74
End: 2024-11-08
Payer: MEDICARE

## 2024-11-08 VITALS
SYSTOLIC BLOOD PRESSURE: 110 MMHG | OXYGEN SATURATION: 96 % | HEART RATE: 94 BPM | DIASTOLIC BLOOD PRESSURE: 64 MMHG | WEIGHT: 255 LBS | RESPIRATION RATE: 18 BRPM | HEIGHT: 66 IN | TEMPERATURE: 96.7 F | BODY MASS INDEX: 40.98 KG/M2

## 2024-11-08 DIAGNOSIS — R82.90 BAD ODOR OF URINE: ICD-10-CM

## 2024-11-08 DIAGNOSIS — Z00.00 ENCOUNTER FOR MEDICARE ANNUAL WELLNESS EXAM: Primary | ICD-10-CM

## 2024-11-08 DIAGNOSIS — E11.9 TYPE 2 DIABETES MELLITUS WITHOUT COMPLICATION, WITHOUT LONG-TERM CURRENT USE OF INSULIN (HCC): ICD-10-CM

## 2024-11-08 DIAGNOSIS — E78.2 MIXED HYPERLIPIDEMIA: ICD-10-CM

## 2024-11-08 DIAGNOSIS — I10 ESSENTIAL HYPERTENSION: ICD-10-CM

## 2024-11-08 DIAGNOSIS — Z12.5 PROSTATE CANCER SCREENING: ICD-10-CM

## 2024-11-08 DIAGNOSIS — R80.9 TYPE 2 DIABETES MELLITUS WITH MICROALBUMINURIA, WITHOUT LONG-TERM CURRENT USE OF INSULIN (HCC): ICD-10-CM

## 2024-11-08 DIAGNOSIS — E11.29 TYPE 2 DIABETES MELLITUS WITH MICROALBUMINURIA, WITHOUT LONG-TERM CURRENT USE OF INSULIN (HCC): ICD-10-CM

## 2024-11-08 LAB
BASOPHILS # BLD AUTO: 0.04 THOUSANDS/ÂΜL (ref 0–0.1)
BASOPHILS NFR BLD AUTO: 1 % (ref 0–1)
EOSINOPHIL # BLD AUTO: 0.21 THOUSAND/ÂΜL (ref 0–0.61)
EOSINOPHIL NFR BLD AUTO: 3 % (ref 0–6)
ERYTHROCYTE [DISTWIDTH] IN BLOOD BY AUTOMATED COUNT: 12.9 % (ref 11.6–15.1)
HCT VFR BLD AUTO: 40.5 % (ref 36.5–49.3)
HGB BLD-MCNC: 13.3 G/DL (ref 12–17)
IMM GRANULOCYTES # BLD AUTO: 0.02 THOUSAND/UL (ref 0–0.2)
IMM GRANULOCYTES NFR BLD AUTO: 0 % (ref 0–2)
LYMPHOCYTES # BLD AUTO: 1.45 THOUSANDS/ÂΜL (ref 0.6–4.47)
LYMPHOCYTES NFR BLD AUTO: 19 % (ref 14–44)
MCH RBC QN AUTO: 30.2 PG (ref 26.8–34.3)
MCHC RBC AUTO-ENTMCNC: 32.8 G/DL (ref 31.4–37.4)
MCV RBC AUTO: 92 FL (ref 82–98)
MONOCYTES # BLD AUTO: 0.71 THOUSAND/ÂΜL (ref 0.17–1.22)
MONOCYTES NFR BLD AUTO: 9 % (ref 4–12)
NEUTROPHILS # BLD AUTO: 5.18 THOUSANDS/ÂΜL (ref 1.85–7.62)
NEUTS SEG NFR BLD AUTO: 68 % (ref 43–75)
NRBC BLD AUTO-RTO: 0 /100 WBCS
PLATELET # BLD AUTO: 208 THOUSANDS/UL (ref 149–390)
PMV BLD AUTO: 11.4 FL (ref 8.9–12.7)
RBC # BLD AUTO: 4.4 MILLION/UL (ref 3.88–5.62)
WBC # BLD AUTO: 7.61 THOUSAND/UL (ref 4.31–10.16)

## 2024-11-08 PROCEDURE — 87077 CULTURE AEROBIC IDENTIFY: CPT

## 2024-11-08 PROCEDURE — 80053 COMPREHEN METABOLIC PANEL: CPT

## 2024-11-08 PROCEDURE — G0439 PPPS, SUBSEQ VISIT: HCPCS | Performed by: NURSE PRACTITIONER

## 2024-11-08 PROCEDURE — 87086 URINE CULTURE/COLONY COUNT: CPT

## 2024-11-08 PROCEDURE — 80061 LIPID PANEL: CPT

## 2024-11-08 PROCEDURE — 82043 UR ALBUMIN QUANTITATIVE: CPT

## 2024-11-08 PROCEDURE — 36415 COLL VENOUS BLD VENIPUNCTURE: CPT

## 2024-11-08 PROCEDURE — 82570 ASSAY OF URINE CREATININE: CPT

## 2024-11-08 PROCEDURE — 81001 URINALYSIS AUTO W/SCOPE: CPT

## 2024-11-08 PROCEDURE — 99214 OFFICE O/P EST MOD 30 MIN: CPT | Performed by: NURSE PRACTITIONER

## 2024-11-08 PROCEDURE — 85025 COMPLETE CBC W/AUTO DIFF WBC: CPT

## 2024-11-08 PROCEDURE — 83036 HEMOGLOBIN GLYCOSYLATED A1C: CPT

## 2024-11-08 PROCEDURE — 87186 SC STD MICRODIL/AGAR DIL: CPT

## 2024-11-08 RX ORDER — TIRZEPATIDE 10 MG/.5ML
10 INJECTION, SOLUTION SUBCUTANEOUS WEEKLY
Qty: 2 ML | Refills: 1 | Status: SHIPPED | OUTPATIENT
Start: 2024-11-08

## 2024-11-08 NOTE — PROGRESS NOTES
"Ambulatory Visit  Name: Hay Dela Cruz      : 1950      MRN: [id number]  Encounter Provider: BRANDI Mayer  Encounter Date: 2024   Encounter department: [facility] PRIMARY CARE    Assessment & Plan  Type 2 diabetes mellitus without complication, without long-term current use of insulin (HCC)    Lab Results   Component Value Date    HGBA1C 6.8 (H) 2024       Orders:    Tirzepatide (Mounjaro) 10 MG/0.5ML SOAJ; Inject 10 mg under the skin once a week    Hemoglobin A1C; Future    Comprehensive metabolic panel; Future    CBC and differential; Future    Albumin / creatinine urine ratio; Future    Mixed hyperlipidemia    Orders:    Lipid Panel with Direct LDL reflex; Future    Bad odor of urine    Orders:    Urinalysis with microscopic; Future    Urine culture; Future    Prostate cancer screening    Orders:    PSA, Total Screen; Future    Encounter for Medicare annual wellness exam         Essential hypertension           Depression Screening and Follow-up Plan: Patient was screened for depression during today's encounter. They screened negative with a PHQ-2 score of 0.      Preventive health issues were discussed with patient, and age appropriate screening tests were ordered as noted in patient's After Visit Summary. Personalized health advice and appropriate referrals for health education or preventive services given if needed, as noted in patient's After Visit Summary.    History of Present Illness     Here for 6 month medcheck-  Reports some dizziness with working outside- discussed blood pressure and needing to eat for energy when burning energy. Notices this happens when he doesn't eat before going out to work    Is currently on Mounjaro 7.5mg, has lost about 30 pounds. Is due for Hga1c, last was 6.8 in April. He is willing to get bloodwork done today.     Reports foul urine smell \"like bolgna\" , reports even his skin smells like this at times. His urologist is aware, had a " cystoscope, told it could be a side effect of his medications. Will get UA/culture done today with labs.        Patient Care Team:  BRANDI aMyer as PCP - General (Family Medicine)  BRANDI Koch as Nurse Practitioner (Urology)  BRANDI Herman as Nurse Practitioner (Nurse Practitioner)  Keesha Rush PA-C as Wound Care (Physician Assistant)  Brandon Corado MD (Urology)    Review of Systems   Constitutional:  Negative for activity change, diaphoresis, fatigue and fever.   HENT:  Negative for congestion, facial swelling, hearing loss, rhinorrhea, sinus pressure, sinus pain, sneezing, sore throat and voice change.    Eyes:  Negative for discharge and visual disturbance.   Respiratory:  Negative for cough, choking, chest tightness, shortness of breath, wheezing and stridor.    Cardiovascular:  Negative for chest pain, palpitations and leg swelling.   Gastrointestinal:  Negative for abdominal distention, abdominal pain, constipation, diarrhea, nausea and vomiting.   Endocrine: Negative for polydipsia, polyphagia and polyuria.   Genitourinary:  Negative for difficulty urinating, dysuria, frequency and urgency.   Musculoskeletal:  Negative for arthralgias, back pain, gait problem, joint swelling, myalgias, neck pain and neck stiffness.   Skin:  Negative for color change, rash and wound.   Neurological:  Positive for dizziness (see hpi). Negative for syncope, speech difficulty, weakness, light-headedness and headaches.   Hematological:  Negative for adenopathy. Does not bruise/bleed easily.   Psychiatric/Behavioral:  Negative for agitation, behavioral problems, confusion, hallucinations, sleep disturbance and suicidal ideas. The patient is not nervous/anxious.      Medical History Reviewed by provider this encounter:  Tobacco  Allergies  Meds  Problems  Med Hx  Surg Hx  Fam Hx       Annual Wellness Visit Questionnaire   Hay is here for his Subsequent Wellness visit.     Health Risk Assessment:    Patient rates overall health as good. Patient feels that their physical health rating is same. Patient is satisfied with their life. Eyesight was rated as same. Hearing was rated as same. Patient feels that their emotional and mental health rating is same. Patients states they are never, rarely angry. Patient states they are sometimes unusually tired/fatigued. Pain experienced in the last 7 days has been none. Patient states that he has experienced no weight loss or gain in last 6 months.     Depression Screening:   PHQ-2 Score: 0      Fall Risk Screening:   In the past year, patient has experienced: no history of falling in past year      Home Safety:  Patient does not have trouble with stairs inside or outside of their home. Patient has working smoke alarms and has working carbon monoxide detector. Home safety hazards include: none.     Nutrition:   Current diet is Regular.     Medications:   Patient is not currently taking any over-the-counter supplements. Patient is able to manage medications.     Activities of Daily Living (ADLs)/Instrumental Activities of Daily Living (IADLs):   Walk and transfer into and out of bed and chair?: Yes  Dress and groom yourself?: Yes    Bathe or shower yourself?: Yes    Feed yourself? Yes  Do your laundry/housekeeping?: Yes  Manage your money, pay your bills and track your expenses?: Yes  Make your own meals?: Yes    Do your own shopping?: Yes    Previous Hospitalizations:   Any hospitalizations or ED visits within the last 12 months?: No      Advance Care Planning:   Living will: No    Advanced directive: Yes    Advanced directive counseling given: Yes    ACP document given: Yes      PREVENTIVE SCREENINGS      Cardiovascular Screening:    General: Screening Not Indicated and History Lipid Disorder    Due for: Lipid Panel      Diabetes Screening:     General: Screening Not Indicated and History Diabetes    Due for: Blood Glucose      Colorectal Cancer Screening:     General:  "Screening Current      Prostate Cancer Screening:    General: Screening Current    Due for: PSA      Abdominal Aortic Aneurysm (AAA) Screening:    Risk factors include: age between 65-74 yo        Lung Cancer Screening:     General: Screening Not Indicated      Hepatitis C Screening:    General: Screening Current    Screening, Brief Intervention, and Referral to Treatment (SBIRT)    Screening  Typical number of drinks in a day: 0  Typical number of drinks in a week: 0  Interpretation: Low risk drinking behavior.    Single Item Drug Screening:  How often have you used an illegal drug (including marijuana) or a prescription medication for non-medical reasons in the past year? never    Single Item Drug Screen Score: 0  Interpretation: Negative screen for possible drug use disorder    Social Determinants of Health     Financial Resource Strain: Low Risk  (11/2/2023)    Overall Financial Resource Strain (CARDIA)     Difficulty of Paying Living Expenses: Not very hard   Food Insecurity: No Food Insecurity (11/8/2024)    Hunger Vital Sign     Worried About Running Out of Food in the Last Year: Never true     Ran Out of Food in the Last Year: Never true   Transportation Needs: No Transportation Needs (11/8/2024)    PRAPARE - Transportation     Lack of Transportation (Medical): No     Lack of Transportation (Non-Medical): No   Housing Stability: Low Risk  (11/8/2024)    Housing Stability Vital Sign     Unable to Pay for Housing in the Last Year: No     Number of Times Moved in the Last Year: 0     Homeless in the Last Year: No   Utilities: Not At Risk (11/8/2024)    [facility] Utilities     Threatened with loss of utilities: No     No results found.    Objective     /64   Pulse 94   Temp (!) 96.7 °F (35.9 °C)   Resp 18   Ht 5' 6\" (1.676 m)   Wt 116 kg (255 lb)   SpO2 96%   BMI 41.16 kg/m²     Physical Exam  Vitals and nursing note reviewed. Exam conducted with a chaperone present (wife).   Constitutional:       " General: He is not in acute distress.     Appearance: Normal appearance. He is well-developed. He is not diaphoretic.   Neck:      Thyroid: No thyromegaly.   Cardiovascular:      Rate and Rhythm: Normal rate and regular rhythm.      Heart sounds: Murmur heard.   Pulmonary:      Effort: Pulmonary effort is normal. No respiratory distress.      Breath sounds: Normal breath sounds. No wheezing.   Musculoskeletal:         General: No tenderness or deformity. Normal range of motion.      Cervical back: Normal range of motion and neck supple.   Skin:     General: Skin is warm and dry.   Neurological:      Mental Status: He is alert and oriented to person, place, and time.   Psychiatric:         Mood and Affect: Mood normal.         Speech: Speech normal.         Behavior: Behavior normal.         Thought Content: Thought content normal.         Judgment: Judgment normal.

## 2024-11-08 NOTE — PATIENT INSTRUCTIONS
Medicare Preventive Visit Patient Instructions  Thank you for completing your Welcome to Medicare Visit or Medicare Annual Wellness Visit today. Your next wellness visit will be due in one year (11/9/2025).  The screening/preventive services that you may require over the next 5-10 years are detailed below. Some tests may not apply to you based off risk factors and/or age. Screening tests ordered at today's visit but not completed yet may show as past due. Also, please note that scanned in results may not display below.  Preventive Screenings:  Service Recommendations Previous Testing/Comments   Colorectal Cancer Screening  Colonoscopy    Fecal Occult Blood Test (FOBT)/Fecal Immunochemical Test (FIT)  Fecal DNA/Cologuard Test  Flexible Sigmoidoscopy Age: 45-75 years old   Colonoscopy: every 10 years (May be performed more frequently if at higher risk)  OR  FOBT/FIT: every 1 year  OR  Cologuard: every 3 years  OR  Sigmoidoscopy: every 5 years  Screening may be recommended earlier than age 45 if at higher risk for colorectal cancer. Also, an individualized decision between you and your healthcare provider will decide whether screening between the ages of 76-85 would be appropriate. Colonoscopy: 05/14/2024  FOBT/FIT: Not on file  Cologuard: Not on file  Sigmoidoscopy: Not on file    Screening Current     Prostate Cancer Screening Individualized decision between patient and health care provider in men between ages of 55-69   Medicare will cover every 12 months beginning on the day after your 50th birthday PSA: 0.54 ng/mL     Screening Current     Hepatitis C Screening Once for adults born between 1945 and 1965  More frequently in patients at high risk for Hepatitis C Hep C Antibody: 08/24/2019    Screening Current   Diabetes Screening 1-2 times per year if you're at risk for diabetes or have pre-diabetes Fasting glucose: 125 mg/dL (4/17/2024)  A1C: 6.8 % (4/17/2024)  Screening Not Indicated  History Diabetes    Cholesterol Screening Once every 5 years if you don't have a lipid disorder. May order more often based on risk factors. Lipid panel: 04/17/2024  Screening Not Indicated  History Lipid Disorder      Other Preventive Screenings Covered by Medicare:  Abdominal Aortic Aneurysm (AAA) Screening: covered once if your at risk. You're considered to be at risk if you have a family history of AAA or a male between the age of 65-75 who smoking at least 100 cigarettes in your lifetime.  Lung Cancer Screening: covers low dose CT scan once per year if you meet all of the following conditions: (1) Age 55-77; (2) No signs or symptoms of lung cancer; (3) Current smoker or have quit smoking within the last 15 years; (4) You have a tobacco smoking history of at least 20 pack years (packs per day x number of years you smoked); (5) You get a written order from a healthcare provider.  Glaucoma Screening: covered annually if you're considered high risk: (1) You have diabetes OR (2) Family history of glaucoma OR (3)  aged 50 and older OR (4)  American aged 65 and older  Osteoporosis Screening: covered every 2 years if you meet one of the following conditions: (1) Have a vertebral abnormality; (2) On glucocorticoid therapy for more than 3 months; (3) Have primary hyperparathyroidism; (4) On osteoporosis medications and need to assess response to drug therapy.  HIV Screening: covered annually if you're between the age of 15-65. Also covered annually if you are younger than 15 and older than 65 with risk factors for HIV infection. For pregnant patients, it is covered up to 3 times per pregnancy.    Immunizations:  Immunization Recommendations   Influenza Vaccine Annual influenza vaccination during flu season is recommended for all persons aged >= 6 months who do not have contraindications   Pneumococcal Vaccine   * Pneumococcal conjugate vaccine = PCV13 (Prevnar 13), PCV15 (Vaxneuvance), PCV20 (Prevnar 20)  *  Pneumococcal polysaccharide vaccine = PPSV23 (Pneumovax) Adults 19-65 yo with certain risk factors or if 65+ yo  If never received any pneumonia vaccine: recommend Prevnar 20 (PCV20)  Give PCV20 if previously received 1 dose of PCV13 or PPSV23   Hepatitis B Vaccine 3 dose series if at intermediate or high risk (ex: diabetes, end stage renal disease, liver disease)   Respiratory syncytial virus (RSV) Vaccine - COVERED BY MEDICARE PART D  * RSVPreF3 (Arexvy) CDC recommends that adults 60 years of age and older may receive a single dose of RSV vaccine using shared clinical decision-making (SCDM)   Tetanus (Td) Vaccine - COST NOT COVERED BY MEDICARE PART B Following completion of primary series, a booster dose should be given every 10 years to maintain immunity against tetanus. Td may also be given as tetanus wound prophylaxis.   Tdap Vaccine - COST NOT COVERED BY MEDICARE PART B Recommended at least once for all adults. For pregnant patients, recommended with each pregnancy.   Shingles Vaccine (Shingrix) - COST NOT COVERED BY MEDICARE PART B  2 shot series recommended in those 19 years and older who have or will have weakened immune systems or those 50 years and older     Health Maintenance Due:      Topic Date Due   • Colorectal Cancer Screening  05/14/2027   • Hepatitis C Screening  Completed     Immunizations Due:      Topic Date Due   • Influenza Vaccine (1) 09/01/2024   • COVID-19 Vaccine (7 - 2023-24 season) 09/01/2024     Advance Directives   What are advance directives?  Advance directives are legal documents that state your wishes and plans for medical care. These plans are made ahead of time in case you lose your ability to make decisions for yourself. Advance directives can apply to any medical decision, such as the treatments you want, and if you want to donate organs.   What are the types of advance directives?  There are many types of advance directives, and each state has rules about how to use them. You  may choose a combination of any of the following:  Living will:  This is a written record of the treatment you want. You can also choose which treatments you do not want, which to limit, and which to stop at a certain time. This includes surgery, medicine, IV fluid, and tube feedings.   Durable power of  for healthcare (DPAHC):  This is a written record that states who you want to make healthcare choices for you when you are unable to make them for yourself. This person, called a proxy, is usually a family member or a friend. You may choose more than 1 proxy.  Do not resuscitate (DNR) order:  A DNR order is used in case your heart stops beating or you stop breathing. It is a request not to have certain forms of treatment, such as CPR. A DNR order may be included in other types of advance directives.  Medical directive:  This covers the care that you want if you are in a coma, near death, or unable to make decisions for yourself. You can list the treatments you want for each condition. Treatment may include pain medicine, surgery, blood transfusions, dialysis, IV or tube feedings, and a ventilator (breathing machine).  Values history:  This document has questions about your views, beliefs, and how you feel and think about life. This information can help others choose the care that you would choose.  Why are advance directives important?  An advance directive helps you control your care. Although spoken wishes may be used, it is better to have your wishes written down. Spoken wishes can be misunderstood, or not followed. Treatments may be given even if you do not want them. An advance directive may make it easier for your family to make difficult choices about your care.   Weight Management   Why it is important to manage your weight:  Being overweight increases your risk of health conditions such as heart disease, high blood pressure, type 2 diabetes, and certain types of cancer. It can also increase your  risk for osteoarthritis, sleep apnea, and other respiratory problems. Aim for a slow, steady weight loss. Even a small amount of weight loss can lower your risk of health problems.  How to lose weight safely:  A safe and healthy way to lose weight is to eat fewer calories and get regular exercise. You can lose up about 1 pound a week by decreasing the number of calories you eat by 500 calories each day.   Healthy meal plan for weight management:  A healthy meal plan includes a variety of foods, contains fewer calories, and helps you stay healthy. A healthy meal plan includes the following:  Eat whole-grain foods more often.  A healthy meal plan should contain fiber. Fiber is the part of grains, fruits, and vegetables that is not broken down by your body. Whole-grain foods are healthy and provide extra fiber in your diet. Some examples of whole-grain foods are whole-wheat breads and pastas, oatmeal, brown rice, and bulgur.  Eat a variety of vegetables every day.  Include dark, leafy greens such as spinach, kale, jesus greens, and mustard greens. Eat yellow and orange vegetables such as carrots, sweet potatoes, and winter squash.   Eat a variety of fruits every day.  Choose fresh or canned fruit (canned in its own juice or light syrup) instead of juice. Fruit juice has very little or no fiber.  Eat low-fat dairy foods.  Drink fat-free (skim) milk or 1% milk. Eat fat-free yogurt and low-fat cottage cheese. Try low-fat cheeses such as mozzarella and other reduced-fat cheeses.  Choose meat and other protein foods that are low in fat.  Choose beans or other legumes such as split peas or lentils. Choose fish, skinless poultry (chicken or turkey), or lean cuts of red meat (beef or pork). Before you cook meat or poultry, cut off any visible fat.   Use less fat and oil.  Try baking foods instead of frying them. Add less fat, such as margarine, sour cream, regular salad dressing and mayonnaise to foods. Eat fewer high-fat  foods. Some examples of high-fat foods include french fries, doughnuts, ice cream, and cakes.  Eat fewer sweets.  Limit foods and drinks that are high in sugar. This includes candy, cookies, regular soda, and sweetened drinks.  Exercise:  Exercise at least 30 minutes per day on most days of the week. Some examples of exercise include walking, biking, dancing, and swimming. You can also fit in more physical activity by taking the stairs instead of the elevator or parking farther away from stores. Ask your healthcare provider about the best exercise plan for you.      © Copyright [employer] 2018 Information is for End User's use only and may not be sold, redistributed or otherwise used for commercial purposes. All illustrations and images included in CareNotes® are the copyrighted property of [employer] or [employer]

## 2024-11-09 LAB
ALBUMIN SERPL BCG-MCNC: 4.3 G/DL (ref 3.5–5)
ALP SERPL-CCNC: 55 U/L (ref 34–104)
ALT SERPL W P-5'-P-CCNC: 28 U/L (ref 7–52)
ANION GAP SERPL CALCULATED.3IONS-SCNC: 8 MMOL/L (ref 4–13)
AST SERPL W P-5'-P-CCNC: 25 U/L (ref 13–39)
BACTERIA UR QL AUTO: ABNORMAL /HPF
BILIRUB SERPL-MCNC: 0.53 MG/DL (ref 0.2–1)
BILIRUB UR QL STRIP: NEGATIVE
BUDDING YEAST: ABNORMAL
BUN SERPL-MCNC: 22 MG/DL (ref 5–25)
CALCIUM SERPL-MCNC: 9.6 MG/DL (ref 8.4–10.2)
CAOX CRY URNS QL MICRO: ABNORMAL /HPF
CHLORIDE SERPL-SCNC: 104 MMOL/L (ref 96–108)
CHOLEST SERPL-MCNC: 105 MG/DL
CLARITY UR: ABNORMAL
CO2 SERPL-SCNC: 27 MMOL/L (ref 21–32)
COLOR UR: YELLOW
CREAT SERPL-MCNC: 1.24 MG/DL (ref 0.6–1.3)
CREAT UR-MCNC: 242.7 MG/DL
EST. AVERAGE GLUCOSE BLD GHB EST-MCNC: 128 MG/DL
GFR SERPL CREATININE-BSD FRML MDRD: 56 ML/MIN/1.73SQ M
GLUCOSE P FAST SERPL-MCNC: 96 MG/DL (ref 65–99)
GLUCOSE UR STRIP-MCNC: NEGATIVE MG/DL
HBA1C MFR BLD: 6.1 %
HDLC SERPL-MCNC: 42 MG/DL
HGB UR QL STRIP.AUTO: NEGATIVE
KETONES UR STRIP-MCNC: NEGATIVE MG/DL
LDLC SERPL CALC-MCNC: 37 MG/DL (ref 0–100)
LEUKOCYTE ESTERASE UR QL STRIP: ABNORMAL
MICROALBUMIN UR-MCNC: 208.9 MG/L
MICROALBUMIN/CREAT 24H UR: 86 MG/G CREATININE (ref 0–30)
NITRITE UR QL STRIP: POSITIVE
NON-SQ EPI CELLS URNS QL MICRO: ABNORMAL /HPF
PH UR STRIP.AUTO: 5.5 [PH]
POTASSIUM SERPL-SCNC: 4.2 MMOL/L (ref 3.5–5.3)
PROT SERPL-MCNC: 6.9 G/DL (ref 6.4–8.4)
PROT UR STRIP-MCNC: ABNORMAL MG/DL
RBC #/AREA URNS AUTO: ABNORMAL /HPF
SODIUM SERPL-SCNC: 139 MMOL/L (ref 135–147)
SP GR UR STRIP.AUTO: 1.02 (ref 1–1.03)
TRIGL SERPL-MCNC: 131 MG/DL
UROBILINOGEN UR STRIP-ACNC: <2 MG/DL
WBC #/AREA URNS AUTO: ABNORMAL /HPF

## 2024-11-10 LAB — BACTERIA UR CULT: ABNORMAL

## 2024-11-11 DIAGNOSIS — N39.0 URINARY TRACT INFECTION WITHOUT HEMATURIA, SITE UNSPECIFIED: Primary | ICD-10-CM

## 2024-11-11 RX ORDER — SULFAMETHOXAZOLE AND TRIMETHOPRIM 800; 160 MG/1; MG/1
1 TABLET ORAL EVERY 12 HOURS SCHEDULED
Qty: 14 TABLET | Refills: 0 | Status: SHIPPED | OUTPATIENT
Start: 2024-11-11 | End: 2024-11-18

## 2024-11-13 ENCOUNTER — TELEPHONE (OUTPATIENT)
Dept: FAMILY MEDICINE CLINIC | Facility: CLINIC | Age: 74
End: 2024-11-13

## 2024-11-22 ENCOUNTER — TELEPHONE (OUTPATIENT)
Dept: FAMILY MEDICINE CLINIC | Facility: CLINIC | Age: 74
End: 2024-11-22

## 2024-11-25 DIAGNOSIS — I10 ESSENTIAL HYPERTENSION: ICD-10-CM

## 2024-11-25 DIAGNOSIS — E11.29 TYPE 2 DIABETES MELLITUS WITH MICROALBUMINURIA, WITHOUT LONG-TERM CURRENT USE OF INSULIN (HCC): ICD-10-CM

## 2024-11-25 DIAGNOSIS — R80.9 TYPE 2 DIABETES MELLITUS WITH MICROALBUMINURIA, WITHOUT LONG-TERM CURRENT USE OF INSULIN (HCC): ICD-10-CM

## 2024-11-25 DIAGNOSIS — E78.2 MIXED HYPERLIPIDEMIA: ICD-10-CM

## 2024-11-26 RX ORDER — VALSARTAN 160 MG/1
160 TABLET ORAL DAILY
Qty: 90 TABLET | Refills: 1 | Status: SHIPPED | OUTPATIENT
Start: 2024-11-26

## 2024-11-26 RX ORDER — ROSUVASTATIN CALCIUM 5 MG/1
5 TABLET, COATED ORAL DAILY
Qty: 90 TABLET | Refills: 1 | Status: SHIPPED | OUTPATIENT
Start: 2024-11-26

## 2024-11-26 RX ORDER — AMLODIPINE BESYLATE 10 MG/1
10 TABLET ORAL DAILY
Qty: 90 TABLET | Refills: 1 | Status: SHIPPED | OUTPATIENT
Start: 2024-11-26

## 2024-11-26 RX ORDER — HYDROCHLOROTHIAZIDE 12.5 MG/1
12.5 TABLET ORAL DAILY
Qty: 90 TABLET | Refills: 1 | Status: SHIPPED | OUTPATIENT
Start: 2024-11-26

## 2024-11-26 RX ORDER — PIOGLITAZONE 15 MG/1
15 TABLET ORAL DAILY
Qty: 90 TABLET | Refills: 1 | Status: SHIPPED | OUTPATIENT
Start: 2024-11-26

## 2024-12-03 DIAGNOSIS — E78.2 MIXED HYPERLIPIDEMIA: ICD-10-CM

## 2024-12-04 RX ORDER — EZETIMIBE 10 MG/1
10 TABLET ORAL DAILY
Qty: 90 TABLET | Refills: 1 | Status: SHIPPED | OUTPATIENT
Start: 2024-12-04

## 2024-12-16 ENCOUNTER — HOSPITAL ENCOUNTER (OUTPATIENT)
Dept: MRI IMAGING | Facility: HOSPITAL | Age: 74
Discharge: HOME | End: 2024-12-16
Attending: UROLOGY
Payer: MEDICARE

## 2024-12-16 DIAGNOSIS — C64.1 RENAL CELL CARCINOMA OF RIGHT KIDNEY (HCC): ICD-10-CM

## 2024-12-16 PROCEDURE — A9585 GADOBUTROL INJECTION: HCPCS | Performed by: UROLOGY

## 2024-12-16 PROCEDURE — 74183 MRI ABD W/O CNTR FLWD CNTR: CPT

## 2024-12-16 RX ORDER — GADOBUTROL 604.72 MG/ML
11 INJECTION INTRAVENOUS
Status: COMPLETED | OUTPATIENT
Start: 2024-12-16 | End: 2024-12-16

## 2024-12-16 RX ADMIN — GADOBUTROL 11 ML: 604.72 INJECTION INTRAVENOUS at 10:03

## 2024-12-23 ENCOUNTER — TELEPHONE (OUTPATIENT)
Age: 74
End: 2024-12-23

## 2024-12-23 NOTE — TELEPHONE ENCOUNTER
Please call to triage- how many days? BRAT/bland diet, ginger ale, ER if he thinks he could be dehydrated

## 2024-12-23 NOTE — TELEPHONE ENCOUNTER
Phone call from patients wife Marilou stating Hay has a bad stomach ache and has thrown up 2 times ( a lot of mucous) and she is wondering if there is something that can be called in for this or what he should do.  Please advise. Thank you.

## 2024-12-23 NOTE — TELEPHONE ENCOUNTER
Spoke with wife States it started today, Eating bland food, no fever no chills, Patient is able to keep fluids down, states mainly stomach pains with congestion

## 2024-12-31 ENCOUNTER — OFFICE VISIT (OUTPATIENT)
Dept: UROLOGY | Facility: CLINIC | Age: 74
End: 2024-12-31
Payer: MEDICARE

## 2024-12-31 VITALS
WEIGHT: 252.3 LBS | DIASTOLIC BLOOD PRESSURE: 74 MMHG | OXYGEN SATURATION: 97 % | BODY MASS INDEX: 40.55 KG/M2 | HEIGHT: 66 IN | TEMPERATURE: 97.6 F | HEART RATE: 79 BPM | SYSTOLIC BLOOD PRESSURE: 126 MMHG

## 2024-12-31 DIAGNOSIS — N28.89 BILATERAL RENAL MASSES: ICD-10-CM

## 2024-12-31 DIAGNOSIS — N40.1 BENIGN PROSTATIC HYPERPLASIA WITH NOCTURIA: ICD-10-CM

## 2024-12-31 DIAGNOSIS — N39.41 URGE INCONTINENCE: ICD-10-CM

## 2024-12-31 DIAGNOSIS — R35.1 BENIGN PROSTATIC HYPERPLASIA WITH NOCTURIA: ICD-10-CM

## 2024-12-31 DIAGNOSIS — Z12.5 PROSTATE CANCER SCREENING: ICD-10-CM

## 2024-12-31 DIAGNOSIS — C64.9 RENAL CELL CARCINOMA, UNSPECIFIED LATERALITY (HCC): Primary | ICD-10-CM

## 2024-12-31 DIAGNOSIS — R91.1 PULMONARY NODULE: ICD-10-CM

## 2024-12-31 DIAGNOSIS — N28.9 RENAL LESION: ICD-10-CM

## 2024-12-31 PROCEDURE — 99213 OFFICE O/P EST LOW 20 MIN: CPT

## 2024-12-31 RX ORDER — TAMSULOSIN HYDROCHLORIDE 0.4 MG/1
0.4 CAPSULE ORAL 2 TIMES DAILY
Qty: 180 CAPSULE | Refills: 3 | Status: SHIPPED | OUTPATIENT
Start: 2024-12-31

## 2024-12-31 NOTE — ASSESSMENT & PLAN NOTE
See [facility] plan above  Orders:    CT chest w ct abdomen pelvis w wo contrast; Future    Basic metabolic panel; Future

## 2024-12-31 NOTE — ASSESSMENT & PLAN NOTE
Cystoscopy August 2024 revealed trilobar hypertrophy and friable vessels. He declined the addition of finasteride or dutasteride  Continue Flomax 0.4 mg BID    Orders:    tamsulosin (FLOMAX) 0.4 mg; Take 1 capsule (0.4 mg total) by mouth 2 (two) times a day

## 2024-12-31 NOTE — ASSESSMENT & PLAN NOTE
Right renal cell carcinoma s/p cryoablation 10/20/2021.   He is currently under active surveillance for a approximate 1.9 cm solid enhancing posteromedial right renal lesion. Additional small 0.7 cm indeterminate right posterior mid renal lesion. As well as a chronic 0.8 cm left lower pole indeterminate renal lesion.    Surveillance MRI of the abdomen from 12/16/2024 shows no acute changes  He also has stable chronic bilateral pulmonary nodules. We will plan for CT Chest abdomen and pelvis with and without contrast in 6 months with follow-up.      Orders:    CT chest w ct abdomen pelvis w wo contrast; Future    Basic metabolic panel; Future

## 2024-12-31 NOTE — ASSESSMENT & PLAN NOTE
Continue Flomax   Orders:    tamsulosin (FLOMAX) 0.4 mg; Take 1 capsule (0.4 mg total) by mouth 2 (two) times a day

## 2024-12-31 NOTE — PROGRESS NOTES
Name: Hay Dela Cruz      : 1950      MRN: [id number]  Encounter Provider: BRANDI Herman  Encounter Date: 2024   Encounter department: [facility]    :  Assessment & Plan  Renal cell carcinoma, unspecified laterality (HCC)  Right renal cell carcinoma s/p cryoablation 10/20/2021.   He is currently under active surveillance for a approximate 1.9 cm solid enhancing posteromedial right renal lesion. Additional small 0.7 cm indeterminate right posterior mid renal lesion. As well as a chronic 0.8 cm left lower pole indeterminate renal lesion.    Surveillance MRI of the abdomen from 2024 shows no acute changes  He also has stable chronic bilateral pulmonary nodules. We will plan for CT Chest abdomen and pelvis with and without contrast in 6 months with follow-up.      Orders:    CT chest w ct abdomen pelvis w wo contrast; Future    Basic metabolic panel; Future    Renal lesion  See plan above. Currently under active surveillance  Orders:    CT chest w ct abdomen pelvis w wo contrast; Future    Basic metabolic panel; Future    Pulmonary nodule  See [facility] plan above.  Orders:    CT chest w ct abdomen pelvis w wo contrast; Future    Basic metabolic panel; Future    Bilateral renal masses  See [facility] plan above  Orders:    CT chest w ct abdomen pelvis w wo contrast; Future    Basic metabolic panel; Future    Prostate cancer screening  PSA 0.54 (2024)  PSA ordered for 2025.     Orders:    PSA, Total Screen; Future    Benign prostatic hyperplasia with nocturia  Cystoscopy 2024 revealed trilobar hypertrophy and friable vessels. He declined the addition of finasteride or dutasteride  Continue Flomax 0.4 mg BID    Orders:    tamsulosin (FLOMAX) 0.4 mg; Take 1 capsule (0.4 mg total) by mouth 2 (two) times a day    Urge incontinence  Continue Flomax   Orders:    tamsulosin (FLOMAX) 0.4 mg; Take 1 capsule (0.4 mg total) by mouth 2 (two) times a day          Interval  HPI:    He presents today reporting doing well. He did have UTI in November and culture was positive for E-coli. He reported foul smelling urine but no bothersome symptoms. Doing well today and offers no complaints. No reports of gross hematuria, abdominal pain, or flank pain.         History of Present Illness   Objective   There were no vitals taken for this visit.      In summary this is a 74 year old male previously known to Dr. Ríos with history of Right Renal cell carcinoma s/p cryoablation 10/20/2021. 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 Surveillance imaging has shown no evidence of recurrence at the right upper pole cryoablation site. He does however have a 1.9 cm right posterior medial lesion which is suspicious for renal cell carcinoma. Additional 0.7 cm posterior right mid renal lesion and 0.8 cm chronic left lower pole lesion. He is currently under active surveillance for thes findings.     Patient had an MRI of the abdomen with and without contrast in 2019 at an outside institution which demonstrated right upper pole hemorrhagic cyst 1.2 cm.  Patient then had an ultrasound that demonstrated progression of this concern with a renal protocol CT with questionable hypoenhancement.  He underwent IR cryoablation and biopsy 10/20/2021 which revealed papillary renal cell neoplasm. During his initial postop visit on 11/11/2021,< 1 month post cryoablation he was 4 wheeling and developed gross hematuria.  He was advised not to ride his 4 ramirez for at least 2 weeks and then resume slowly.  He did not require evaluation/procedure by Interventional Radiology for this.      Surveillance CT of chest and abdomen from March 2023 had shown interval decrease size of the right upper pole renal neoplasm status post cryoablation.  However there were subcentimeter heterogeneous lesions at the inferior pole of both kidneys, concerning for neoplasm. Additionally there was a 7 mm nodular density at  the bronchus intermedius which may represent small amount of mucus or secretions.  Repeat CT chest and abdomen from 9/6/2023 showed interval resolution of the previously seen nodular opacity in the bronchus intermedius which was likely due to mucous.  There is no findings of metastatic disease in the abdomen.  Stable cryoablation zone in the left kidney upper pole measuring 1.7 x 1.5 cm.  Previously seen heterogeneous subcentimeter lesions at the inferior pole of both kidneys were stable in size and consistent with proteinaceous cyst.     He was seen by me in June 2024 after presenting to [facility] emergency department on 6/10 for complaints of gross hematuria, dysuria, and urinary frequency.   Urine was negative for infection and CT imaging showed no acute changes aside from diffuse bladder wall thickening. No hydroureteronephrosis or radiopaque urolithiasis. Unchanged subcentimeter isodense left lower pole nodule, hemorrhagic cyst versus solid. Given stability since at least 2022, it is likely nonaggressive. Unchanged bilateral renal cysts and subcentimeter hypodensities. Hypertrophy of the median lobe of the prostate protruding into the bladder. It may reflect BPH but given hematuria, correlate clinically to exclude prostatic cancer. Diffuse bladder wall thickening, likely due to prostatomegaly, but correlate to exclude symptoms of cystitis.  He does have history of BPH and is on Flomax.  He would undergo cystoscopy evaluation by Dr. Corado on 8/20/2024 which revealed BPH with trilobar hypertrophy and friable vessels.  This was believed to be the cause of his gross hematuria.  Dr. Corado discussed adding finasteride or dutasteride to his regimen however patient elected to observe. PSA 0.54 on 4/17/2024.     Surveillance CT chest abdomen from 9/09/2024 demonstrated stable pulmonary nodules without new suspicious nodules. No significant change in the area of cryoablation in the upper pole the right kidney  without detectable solid enhancement to suggest recurrent or residual disease. Question a lesion in the posterior interpolar to lower pole of the right kidney. Consider further characterization with contrast-enhanced MR abdomen and/or renal ultrasound.     MRI abdomen 9/20/2024 revealed approximately 1.9 cm solid enhancing posteromedial right mid renal lesion, concerning for renal cell carcinoma. Additional indeterminate 0.7 cm posterior right mid renal lesion. Follow-up MRI abdomen with and without contrast in 3 months is recommended. 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 Dr. Corado reviewed the MRI and CT results with the patient over the phone and discussed active surveillance, repeat cryotherapy, or partial or radical nephrectomy. Patient elected to continue with active surveillance with repeat MRI in 3 months. Surveillance MRI of the abdomen including MRCP from 12/16/2024 shows stable findings.                     Review of Systems   Constitutional:  Negative for chills and fever.   HENT:  Negative for congestion and sore throat.    Respiratory:  Negative for cough and shortness of breath.    Cardiovascular:  Negative for chest pain and leg swelling.   Gastrointestinal:  Negative for abdominal pain, constipation and diarrhea.   Genitourinary:  Negative for difficulty urinating, dysuria, frequency, hematuria and urgency.   Musculoskeletal:  Negative for back pain and gait problem.   Skin:  Negative for wound.   Allergic/Immunologic: Negative for immunocompromised state.   Hematological:  Does not bruise/bleed easily.       Physical Exam  Vitals and nursing note reviewed.   Constitutional:       General: He is not in acute distress.     Appearance: He is well-developed.   HENT:      Head: Normocephalic and atraumatic.   Eyes:      Conjunctiva/sclera: Conjunctivae normal.   Cardiovascular:      Rate and Rhythm: Normal rate and regular rhythm.       Heart sounds: No murmur heard.  Pulmonary:      Effort: Pulmonary effort is normal. No respiratory distress.      Breath sounds: Normal breath sounds.   Abdominal:      Palpations: Abdomen is soft.      Tenderness: There is no abdominal tenderness.   Musculoskeletal:         General: No swelling.      Cervical back: Neck supple.   Skin:     General: Skin is warm and dry.      Capillary Refill: Capillary refill takes less than 2 seconds.   Neurological:      Mental Status: He is alert.   Psychiatric:         Mood and Affect: Mood normal.           Imagin/16/2024    MRI OF THE ABDOMEN WITH AND WITHOUT CONTRAST with MRCP     INDICATION: 74 years / Male. C64.1: Malignant neoplasm of right kidney, except renal pelvis. History of right upper pole renal cell carcinoma cryoablation 10/2021. Active surveillance of additional right renal masses. History of pancreatic cysts.     COMPARISON: 2024 and 2021 MRIs. 2024 and 6/10/2024 CTs.     TECHNIQUE: Multiplanar/multisequence MRI of the abdomen with MRCP was performed before and after administration of contrast.     IV Contrast: 11 mL of Gadobutrol injection (SINGLE-DOSE)     Image quality: Mild motion degradation.     FINDINGS:     LOWER CHEST:   Within normal limits.     LIVER:  Normal signal intensity and morphology.  No suspicious mass.  Patent hepatic and portal veins.     BILE DUCTS:  Normal caliber and morphology for age and cholecystectomy.     GALLBLADDER: Cholecystectomy.     SPLEEN:  Within normal limits.     PANCREAS: Unchanged subcentimeter pancreatic cysts likely side branch IPMN. Normal main duct caliber and morphology.     ADRENAL GLANDS:  Within normal limits.     KIDNEYS/PROXIMAL URETERS:  1.5 cm treated right upper pole renal cell carcinoma. Blood products. No enhancement.     Similar size and morphology of right cortical renal nodules under active surveillance (depth by width):  1.2 x 1.3 cm, posterior right medial, lower pole (,  13/93).  0.7 x 0.7 cm (7/24, 13/84)     0.8 cm chronic left lower pole, posterior, T2 hypointense cortical nodule. Indeterminate for enhancement (7/30 and 13/103).     Similar bilateral, simple peripelvic cysts.     BOWEL:   Within normal limits.     PERITONEUM/RETROPERITONEUM:  No ascites or fluid collections.     LYMPH NODES:  No enlarged nodes.     VASCULAR STRUCTURES:  Within normal limits.     BONES:  No suspicious osseous lesion.     ABDOMINAL WALL:  Within normal limits.        IMPRESSION:     No evidence of viable tumor/recurrence at right kidney cryoablation site.     Stable appearance of 2 right renal nodules on follow-up for active surveillance.     Additional chronic left lower pole renal nodule. May be hemorrhagic cyst, but indeterminate enhancement.        9/20/2024    MRI - ABDOMEN - WITH AND WITHOUT CONTRAST     INDICATION: 74 years / Male. N28.9: Disorder of kidney and ureter, unspecified.     COMPARISON: CT chest/abdomen/pelvis 9/9/2024.     TECHNIQUE: Multiplanar/multisequence MRI of the abdomen was performed before and after administration of contrast.     IV Contrast: 12 mL of Gadobutrol injection (SINGLE-DOSE)     FINDINGS:     LOWER CHEST: Unremarkable.     LIVER:  Normal in size and configuration.  No suspicious mass. Rounded susceptibility artifact in the inferior right hepatic lobe related to surgical clips seen in this region on prior CT.  Patent hepatic and portal veins.     BILE DUCTS: No intrahepatic or extrahepatic bile duct dilation.     GALLBLADDER: Post cholecystectomy.     PANCREAS: Scattered tiny cysts measuring up to 4 mm (series 6 image 19) without solid enhancing components. No main duct dilatation.     ADRENAL GLANDS: Unremarkable.     SPLEEN: Normal.     KIDNEYS/PROXIMAL URETERS: No hydroureteronephrosis. Again seen is a 1.9 x 1.8 cm right renal upper pole lesion status post cryoablation containing intrinsic T1 hyperintensity, likely blood products, without definite nodular  enhancing components to   suggest viable tumor. There is an approximate 1.9 x 1.9 cm posterior medial right mid renal lesion (series 7 image 30), which is T2 heterogeneous, T1 isointense and demonstrates solid enhancement, similar to that of adjacent renal cortex, although   hypoenhancing on delayed phase. There is also a 7 mm right mid renal lesion (series 10 image 205), which is T2 iso to hypointense and T1 iso to hyperintense with a questionable tiny focus of peripheral enhancement posteriorly, noting that this may be   present on the precontrast images and could represent a tiny focus of hemorrhage. Additional bilateral simple and hemorrhagic cysts.     BOWEL: No dilated loops of bowel.     PERITONEUM/RETROPERITONEUM: No ascites.     LYMPH NODES: No abdominal lymphadenopathy.     VESSELS: No aneurysm.     ABDOMINAL WALL: Unremarkable     BONES: No suspicious osseous lesion.     IMPRESSION:     1. Approximately 1.9 cm solid enhancing posteromedial right mid renal lesion, concerning for renal cell carcinoma.     2. Additional indeterminate 0.7 cm posterior right mid renal lesion. Follow-up MRI abdomen with and without contrast in 3 months is recommended.     3. Stable right renal upper pole lesion status post cryoablation without evidence of viable tumor.     4. 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 For simple cyst(s) less than 1.5 cm, recommend follow-up every 2 years for 5 times or to age 80, whichever comes first. Follow-up can stop at   age 80 or can switch over to 80 year or older algorithm. Recommend next follow-up in 2 years. Preferred imaging modality: abdomen MRI and MRCP with and without IV contrast, or triple phase abdomen CT with IV contrast, or abdomen MRI and MRCP without IV   contrast.            9/09/2024    CT CHEST, ABDOMEN AND PELVIS WITH AND WITHOUT IV CONTRAST     INDICATION: R91.1: Solitary pulmonary nodule  C64.9: Malignant  neoplasm of unspecified kidney, except renal pelvis.     COMPARISON: Multiple examinations dating back to 8/24/2019     TECHNIQUE: Initial CT of the abdomen and pelvis was performed without intravenous contrast. Subsequent dynamic CT evaluation of the chest, abdomen and pelvis was performed after the administration of intravenous contrast was performed. Finally, delayed   dynamic delayed phase postcontrast CT evaluation of the abdomen and pelvis was performed. Multiplanar 2D reformatted images were created from the source data.     This examination, like all CT scans performed in the [facility], was performed utilizing techniques to minimize radiation dose exposure, including the use of iterative reconstruction and automated exposure control. Radiation dose length   product (DLP) for this visit: 5112.57 mGy-cm     IV Contrast: 100 mL of iohexol (OMNIPAQUE)  Enteric Contrast: Not administered.     FINDINGS:     CHEST     LUNGS:     Subpleural right lower lobe nodule on series 7 image #187 measuring 4 mm, unchanged.     3 mm right upper lobe nodule on series 7 image #88, unchanged..     Left upper lobe 2 mm nodule on series 7 image #55 is unchanged.     Mild emphysema.     No tracheal or endobronchial lesion.     No new suspicious pulmonary nodules.     PLEURA: Unremarkable.     HEART/GREAT VESSELS: Coronary calcifications. No thoracic aortic aneurysm.     MEDIASTINUM AND SUJIT: Mediastinal lymph nodes are present without pathological enlargement.     CHEST WALL AND LOWER NECK: Unremarkable.     ABDOMEN     RIGHT KIDNEY AND URETER:  Post cryoablation changes in the upper pole with a similar appearance to the prior examination. No detectable enhancement is evident to suggest recurrent or residual disease. Zone of cryoablation estimated to measure 1.4 x 1.5 cm on series 4 image #51.  Question possible lesion in the posterior interpolar/lower pole of the right kidney seen best on coronal series 605 image  #126 and series 4 image 77.  No hydronephrosis or hydroureter.  Several hypodensities are noted within the right kidney that are too small to characterize by CT present on the previous exam.  Parapelvic cysts.  2 mm interpolar calculus.  No perinephric collection.     LEFT KIDNEY AND URETER:  No solid renal mass or detectable urothelial mass.  Parapelvic cysts.  No hydronephrosis or hydroureter.  No urinary tract calculi.  No perinephric collection.  Stable hyperdense lesion at the lower pole the left kidney measuring 0.7 cm and felt to represent a proteinaceous cyst.     URINARY BLADDER:  Underdistention with circumferential urinary bladder wall thickening. No calculi. Grossly similar appearance as compared to the previous exam.        LIVER/BILIARY TREE: Hepatic segment 2 hypodensity measuring 0.7 cm, believed to be present in retrospect without change. Hepatic segment 4 hypodensity measuring 1.4 cm, unchanged. No new hepatic lesions are otherwise evident.     GALLBLADDER: Post cholecystectomy.     SPLEEN: Unremarkable.     PANCREAS: Unremarkable.     ADRENAL GLANDS: Unremarkable.     STOMACH AND BOWEL: Unremarkable.     APPENDIX: No findings to suggest appendicitis.     ABDOMINOPELVIC CAVITY: No ascites. No pneumoperitoneum. No lymphadenopathy.     VESSELS: Atherosclerosis without abdominal aortic aneurysm.     PELVIS     REPRODUCTIVE ORGANS: Prostate is enlarged and projects into the urinary bladder base.     ABDOMINAL WALL/INGUINAL REGIONS: Unremarkable.     BONES: No acute fracture or suspicious osseous lesion. Spinal degenerative changes.     IMPRESSION:     1.  Stable pulmonary nodules as detailed above without new suspicious pulmonary nodules.  2.  Question a lesion in the posterior interpolar to lower pole of the right kidney. Consider further characterization with contrast-enhanced MR abdomen and/or renal ultrasound.  3.  No significant change in the area of cryoablation in the upper pole the right kidney  without detectable solid enhancement to suggest recurrent or residual disease.  4.  No significant change in the appearance of the urinary bladder.  5.  No mediastinal or retroperitoneal lymphadenopathy.          Please Note:  Voice dictation software has been used to create this document. There may be inadvertent transcriptions errors.     BRANDI Herman 12/31/24

## 2025-01-02 ENCOUNTER — TELEPHONE (OUTPATIENT)
Age: 75
End: 2025-01-02

## 2025-01-02 ENCOUNTER — OFFICE VISIT (OUTPATIENT)
Dept: FAMILY MEDICINE CLINIC | Facility: CLINIC | Age: 75
End: 2025-01-02
Payer: MEDICARE

## 2025-01-02 VITALS
SYSTOLIC BLOOD PRESSURE: 126 MMHG | BODY MASS INDEX: 40.18 KG/M2 | OXYGEN SATURATION: 96 % | DIASTOLIC BLOOD PRESSURE: 68 MMHG | HEART RATE: 94 BPM | RESPIRATION RATE: 18 BRPM | HEIGHT: 66 IN | TEMPERATURE: 97.8 F | WEIGHT: 250 LBS

## 2025-01-02 DIAGNOSIS — R35.1 BENIGN PROSTATIC HYPERPLASIA WITH NOCTURIA: ICD-10-CM

## 2025-01-02 DIAGNOSIS — R35.1 BENIGN PROSTATIC HYPERPLASIA WITH NOCTURIA: Primary | ICD-10-CM

## 2025-01-02 DIAGNOSIS — R30.0 DYSURIA: Primary | ICD-10-CM

## 2025-01-02 DIAGNOSIS — N40.1 BENIGN PROSTATIC HYPERPLASIA WITH NOCTURIA: Primary | ICD-10-CM

## 2025-01-02 DIAGNOSIS — N39.41 URGE INCONTINENCE: ICD-10-CM

## 2025-01-02 DIAGNOSIS — N40.1 BENIGN PROSTATIC HYPERPLASIA WITH NOCTURIA: ICD-10-CM

## 2025-01-02 LAB
SL AMB  POCT GLUCOSE, UA: NEGATIVE
SL AMB LEUKOCYTE ESTERASE,UA: NEGATIVE
SL AMB POCT BILIRUBIN,UA: NEGATIVE
SL AMB POCT BLOOD,UA: NEGATIVE
SL AMB POCT CLARITY,UA: CLEAR
SL AMB POCT COLOR,UA: YELLOW
SL AMB POCT KETONES,UA: NORMAL
SL AMB POCT NITRITE,UA: NEGATIVE
SL AMB POCT PH,UA: 6
SL AMB POCT SPECIFIC GRAVITY,UA: 1.02
SL AMB POCT URINE PROTEIN: NORMAL
SL AMB POCT UROBILINOGEN: 0.2

## 2025-01-02 PROCEDURE — G2211 COMPLEX E/M VISIT ADD ON: HCPCS | Performed by: NURSE PRACTITIONER

## 2025-01-02 PROCEDURE — 99213 OFFICE O/P EST LOW 20 MIN: CPT | Performed by: NURSE PRACTITIONER

## 2025-01-02 PROCEDURE — 87086 URINE CULTURE/COLONY COUNT: CPT | Performed by: NURSE PRACTITIONER

## 2025-01-02 PROCEDURE — 81002 URINALYSIS NONAUTO W/O SCOPE: CPT | Performed by: NURSE PRACTITIONER

## 2025-01-02 RX ORDER — FINASTERIDE 5 MG/1
5 TABLET, FILM COATED ORAL DAILY
Qty: 30 TABLET | Refills: 11 | Status: SHIPPED | OUTPATIENT
Start: 2025-01-02

## 2025-01-02 NOTE — TELEPHONE ENCOUNTER
Patient believes he has another UTI, he states his urine has been cloudy and smelly x 3 days, he was questioning if he needs an appt or if something could be ordered for him like in the past, he uses [facility], please advise

## 2025-01-02 NOTE — PROGRESS NOTES
"Name: Hay Dela Cruz      : 1950      MRN: [id number]  Encounter Provider: BRANDI Richardson  Encounter Date: 2025   Encounter department: [facility] PRIMARY CARE  :  Assessment & Plan  Dysuria    Orders:    POCT urine dip    Urine culture; Future    Urge incontinence         Benign prostatic hyperplasia with nocturia  Will reach out to [facility] for rx for finasteride patient would like to try               History of Present Illness     Patient presents for acute visit due to urinary frequency, oab symptoms and urine leakage and foul smelling urine. We have reviewed most recent Urology appointment and medications. He did give a urine sample in office, that does not appear glaring for infection. Culture has been ordered. We have reviewed the plan to wait for culture results and to reach out to Urology today. Patient and wife are agreeable, the earliest result of 2-3 days time.       Review of Systems   Constitutional:  Negative for chills and fever.   HENT:  Negative for ear pain and sore throat.    Eyes:  Negative for pain and visual disturbance.   Respiratory:  Negative for cough and shortness of breath.    Cardiovascular:  Negative for chest pain and palpitations.   Gastrointestinal:  Negative for abdominal pain and vomiting.   Genitourinary:  Positive for frequency and urgency. Negative for dysuria and hematuria.   Musculoskeletal:  Negative for arthralgias and back pain.   Skin:  Negative for color change and rash.   Neurological:  Negative for seizures and syncope.   All other systems reviewed and are negative.      Objective   /68   Pulse 94   Temp 97.8 °F (36.6 °C) (Tympanic)   Resp 18   Ht 5' 6\" (1.676 m)   Wt 113 kg (250 lb)   SpO2 96%   BMI 40.35 kg/m²      Physical Exam  Vitals and nursing note reviewed.   Constitutional:       General: He is not in acute distress.     Appearance: He is well-developed.   HENT:      Head: Normocephalic and atraumatic.   Eyes:      " Conjunctiva/sclera: Conjunctivae normal.   Cardiovascular:      Rate and Rhythm: Normal rate and regular rhythm.      Heart sounds: No murmur heard.  Pulmonary:      Effort: Pulmonary effort is normal. No respiratory distress.      Breath sounds: Normal breath sounds.   Abdominal:      Palpations: Abdomen is soft.      Tenderness: There is no abdominal tenderness.   Musculoskeletal:         General: No swelling.      Cervical back: Neck supple.   Skin:     General: Skin is warm and dry.      Capillary Refill: Capillary refill takes less than 2 seconds.   Neurological:      Mental Status: He is alert and oriented to person, place, and time.   Psychiatric:         Mood and Affect: Mood normal.         Behavior: Behavior normal.         Thought Content: Thought content normal.         Judgment: Judgment normal.

## 2025-01-02 NOTE — PROGRESS NOTES
I received epic secure chat message from patients PCP regarding patient interested in trying finasteride. I sent a prescription to his pharmacy. Patients PCP also checking urine to rule out infection. He was seen by me on 12/31 and reported foul smelling urine otherwise no other bothersome complaints.

## 2025-01-03 LAB — BACTERIA UR CULT: NORMAL

## 2025-01-06 ENCOUNTER — RESULTS FOLLOW-UP (OUTPATIENT)
Dept: FAMILY MEDICINE CLINIC | Facility: CLINIC | Age: 75
End: 2025-01-06

## 2025-01-13 ENCOUNTER — OFFICE VISIT (OUTPATIENT)
Dept: FAMILY MEDICINE CLINIC | Facility: CLINIC | Age: 75
End: 2025-01-13
Payer: MEDICARE

## 2025-01-13 VITALS
HEIGHT: 66 IN | SYSTOLIC BLOOD PRESSURE: 128 MMHG | WEIGHT: 250 LBS | DIASTOLIC BLOOD PRESSURE: 66 MMHG | TEMPERATURE: 98.7 F | HEART RATE: 83 BPM | BODY MASS INDEX: 40.18 KG/M2 | OXYGEN SATURATION: 99 % | RESPIRATION RATE: 18 BRPM

## 2025-01-13 DIAGNOSIS — R05.1 ACUTE COUGH: ICD-10-CM

## 2025-01-13 DIAGNOSIS — U07.1 COVID-19: Primary | ICD-10-CM

## 2025-01-13 LAB
SARS-COV-2 AG UPPER RESP QL IA: POSITIVE
VALID CONTROL: ABNORMAL

## 2025-01-13 PROCEDURE — 99213 OFFICE O/P EST LOW 20 MIN: CPT | Performed by: INTERNAL MEDICINE

## 2025-01-13 PROCEDURE — 87811 SARS-COV-2 COVID19 W/OPTIC: CPT | Performed by: INTERNAL MEDICINE

## 2025-01-13 RX ORDER — DEXTROMETHORPHAN HYDROBROMIDE AND PROMETHAZINE HYDROCHLORIDE 15; 6.25 MG/5ML; MG/5ML
5 SYRUP ORAL 4 TIMES DAILY PRN
Qty: 240 ML | Refills: 0 | Status: SHIPPED | OUTPATIENT
Start: 2025-01-13

## 2025-01-13 RX ORDER — NIRMATRELVIR AND RITONAVIR 150-100 MG
2 KIT ORAL 2 TIMES DAILY
Qty: 20 TABLET | Refills: 0 | Status: SHIPPED | OUTPATIENT
Start: 2025-01-13 | End: 2025-01-18

## 2025-01-13 NOTE — PROGRESS NOTES
"Name: Hay Dela Cruz      : 1950      MRN: [id number]  Encounter Provider: Diana Banks MD  Encounter Date: 2025   Encounter department: [facility] PRIMARY [facility]  :  Assessment & Plan  COVID-19    Orders:  •  POCT Rapid Covid Ag  •  nirmatrelvir & ritonavir (Paxlovid, 150/100,) tablet therapy pack; Take 2 tablets by mouth 2 (two) times a day for 5 days Take 1 nirmatrelvir tablet + 1 ritonavir tablet together per dose  •  promethazine-dextromethorphan (PHENERGAN-DM) 6.25-15 mg/5 mL oral syrup; Take 5 mL by mouth 4 (four) times a day as needed for cough    Acute cough    Orders:  •  promethazine-dextromethorphan (PHENERGAN-DM) 6.25-15 mg/5 mL oral syrup; Take 5 mL by mouth 4 (four) times a day as needed for cough           History of Present Illness     73yo male with HTN, diabetes here for acute visit. Pt reports that symptoms began Friday with nasal congestion, sinus pressure, cough. Wife reports he felt feverish last night but did not check temperature. Reports that he is up to date with COVID/flu vaccines.       Review of Systems   Constitutional:  Positive for appetite change, chills, fatigue and fever.   HENT:  Positive for congestion, sinus pressure, sinus pain and sore throat.    Respiratory:  Positive for cough. Negative for shortness of breath.    Cardiovascular:  Negative for chest pain.   Gastrointestinal:  Negative for diarrhea, nausea and vomiting.   Neurological:  Positive for headaches.       Objective   /66   Pulse 83   Temp 98.7 °F (37.1 °C) (Temporal)   Resp 18   Ht 5' 6\" (1.676 m)   Wt 113 kg (250 lb)   SpO2 99%   BMI 40.35 kg/m²      Physical Exam  Vitals reviewed.   Constitutional:       General: He is not in acute distress.     Appearance: He is obese.   HENT:      Head: Normocephalic and atraumatic.   Cardiovascular:      Rate and Rhythm: Normal rate and regular rhythm.      Heart sounds: No murmur heard.     No friction rub. No gallop.   Pulmonary: "      Effort: Pulmonary effort is normal. No respiratory distress.      Breath sounds: No wheezing, rhonchi or rales.   Neurological:      General: No focal deficit present.      Mental Status: He is alert.   Psychiatric:         Mood and Affect: Mood normal.         Behavior: Behavior normal.

## 2025-01-21 ENCOUNTER — TELEPHONE (OUTPATIENT)
Dept: FAMILY MEDICINE CLINIC | Facility: CLINIC | Age: 75
End: 2025-01-21

## 2025-01-21 DIAGNOSIS — E11.9 TYPE 2 DIABETES MELLITUS WITHOUT COMPLICATION, WITHOUT LONG-TERM CURRENT USE OF INSULIN (HCC): Primary | ICD-10-CM

## 2025-01-21 RX ORDER — TIRZEPATIDE 12.5 MG/.5ML
12.5 INJECTION, SOLUTION SUBCUTANEOUS WEEKLY
Qty: 2 ML | Refills: 0 | Status: SHIPPED | OUTPATIENT
Start: 2025-01-21

## 2025-01-21 NOTE — TELEPHONE ENCOUNTER
How are you tolerating the medication?   [] Nausea  [] Vomiting  [] Diarrhea  [x] Asymptomatic  [] Other:    Last visit weight: 250    Current weight:    Date of last injection: 1/19/25    How many injections do you have left: 1          Wife called the RX Refill Line. Message is being forwarded to the office.     Wife called for an increase of Mounjaro for patient. Currently on 10 mg and is doing fine. Would like that sent to [facility] [address]     Please contact patient at [phone] if any issues.

## 2025-02-04 ENCOUNTER — TELEPHONE (OUTPATIENT)
Dept: FAMILY MEDICINE CLINIC | Facility: CLINIC | Age: 75
End: 2025-02-04

## 2025-02-04 NOTE — TELEPHONE ENCOUNTER
An 81mg Aspirin will hopefully prevent stroke and heart attack- if this is the first time he has had a bleeding issue, I would recommend continuing to take. Recommend not using straight razor for shaving if on aspirin.

## 2025-02-04 NOTE — TELEPHONE ENCOUNTER
Wife stopped in office asking if he still needs to take a baby Asprin    She said that he was shaving the other night and nicked himself and it was bleeding for about 2 hrs

## 2025-02-12 ENCOUNTER — OFFICE VISIT (OUTPATIENT)
Dept: URGENT CARE | Facility: CLINIC | Age: 75
End: 2025-02-12
Payer: MEDICARE

## 2025-02-12 ENCOUNTER — APPOINTMENT (OUTPATIENT)
Dept: RADIOLOGY | Facility: CLINIC | Age: 75
End: 2025-02-12
Payer: MEDICARE

## 2025-02-12 VITALS
SYSTOLIC BLOOD PRESSURE: 128 MMHG | WEIGHT: 250 LBS | DIASTOLIC BLOOD PRESSURE: 68 MMHG | BODY MASS INDEX: 40.18 KG/M2 | OXYGEN SATURATION: 99 % | RESPIRATION RATE: 16 BRPM | TEMPERATURE: 97.2 F | HEIGHT: 66 IN | HEART RATE: 87 BPM

## 2025-02-12 DIAGNOSIS — M25.512 ACUTE PAIN OF LEFT SHOULDER: ICD-10-CM

## 2025-02-12 DIAGNOSIS — M25.512 ACUTE PAIN OF LEFT SHOULDER: Primary | ICD-10-CM

## 2025-02-12 PROCEDURE — 73030 X-RAY EXAM OF SHOULDER: CPT

## 2025-02-12 PROCEDURE — 99213 OFFICE O/P EST LOW 20 MIN: CPT

## 2025-02-12 PROCEDURE — G0463 HOSPITAL OUTPT CLINIC VISIT: HCPCS

## 2025-02-12 NOTE — PROGRESS NOTES
"  [facility]        NAME: Hay Dela Cruz is a 74 y.o. male  : 1950    MRN: [id number]  DATE: 2025  TIME: 1:11 PM    Assessment and Plan   Acute pain of left shoulder [M25.512]  1. Acute pain of left shoulder  XR shoulder 2+ vw left    Ambulatory Referral to Orthopedic Surgery            Patient Instructions   Patient advised to continue at home Voltaren cream, ice and heat shoulder as need for pain relief. Patient refereed to Orthopedics for further assessment.    Follow up with PCP in 3-5 days.  Proceed to  ER if symptoms worsen.    If tests have been performed at [facility], our office will contact you with results if changes need to be made to the care plan discussed with you at the visit.  You can review your full results on [facility].    Chief Complaint     Chief Complaint   Patient presents with    Shoulder Injury     Patient c/o left shoulder pain after falling on it one week ago.         History of Present Illness       75 yo M presenting with pain of left anterior shoulder x 2 weeks. 2 weeks ago he was sitting on a stool when it slipped out from under him and he fell onto his left shoulder. Since then he has had pain \"inside his shoulder\" which has been alleviated by rest, ice, and Voltaren cream. He is still complaining of pain when laying on his left side and has decreased ROM of his left shoulder. He denies any weakness, numbness, tingling, bruising, or wound of his left shoulder. He has a history of a previous surgery to his left shoulder in which he \"tore his muscle and had a pin placed, which kept ripping out\". He states his pain and ROM had improved after the surgery, only worsening after his fall.    Shoulder Injury   Pertinent negatives include no chest pain or numbness.       Review of Systems   Review of Systems   Constitutional:  Negative for chills and fever.   Respiratory:  Negative for cough and shortness of breath.    Cardiovascular:  Negative for chest " pain.   Gastrointestinal:  Negative for abdominal distention, diarrhea, nausea and vomiting.   Musculoskeletal:  Positive for arthralgias. Negative for back pain, gait problem, joint swelling, myalgias, neck pain and neck stiffness.   Skin:  Negative for wound.   Neurological:  Negative for weakness and numbness.         Current Medications       Current Outpatient Medications:     amLODIPine (NORVASC) 10 mg tablet, TAKE 1 TABLET DAILY, Disp: 90 tablet, Rfl: 1    NELLY ASPIRIN EC LOW DOSE PO, Take 81 mg by mouth daily, Disp: , Rfl:     ezetimibe (ZETIA) 10 mg tablet, TAKE 1 TABLET DAILY, Disp: 90 tablet, Rfl: 1    finasteride (PROSCAR) 5 mg tablet, Take 1 tablet (5 mg total) by mouth daily, Disp: 30 tablet, Rfl: 11    glucose blood test strip, Use to test sugars four times daily CONTOUR TEST STRIP, Disp: 100 each, Rfl: 5    hydroCHLOROthiazide 12.5 mg tablet, TAKE 1 TABLET DAILY, Disp: 90 tablet, Rfl: 1    Klor-Con M20 20 MEQ tablet, TAKE 1 TABLET DAILY, Disp: 90 tablet, Rfl: 3    metFORMIN (GLUCOPHAGE) 500 mg tablet, TAKE 2 TABLETS IN THE MORNING AND 1 TABLET AT NIGHT, Disp: 270 tablet, Rfl: 3    Multiple Vitamins-Minerals (CENTRUM SILVER 50+MEN PO), Centrum Silver, Disp: , Rfl:     OneTouch Delica Lancets 33G MISC, Use to test sugars three times daily. E11.9, Disp: 100 each, Rfl: 5    pioglitazone (ACTOS) 15 mg tablet, TAKE 1 TABLET DAILY, Disp: 90 tablet, Rfl: 1    Probiotic Product (PROBIOTIC ADVANCED PO), Take by mouth, Disp: , Rfl:     promethazine-dextromethorphan (PHENERGAN-DM) 6.25-15 mg/5 mL oral syrup, Take 5 mL by mouth 4 (four) times a day as needed for cough, Disp: 240 mL, Rfl: 0    rosuvastatin (CRESTOR) 5 mg tablet, TAKE 1 TABLET DAILY, Disp: 90 tablet, Rfl: 1    tamsulosin (FLOMAX) 0.4 mg, Take 1 capsule (0.4 mg total) by mouth 2 (two) times a day, Disp: 180 capsule, Rfl: 3    Tirzepatide (Mounjaro) 12.5 MG/0.5ML SOAJ, Inject 12.5 mg under the skin once a week, Disp: 2 mL, Rfl: 0    valsartan  "(DIOVAN) 160 mg tablet, TAKE 1 TABLET DAILY, Disp: 90 tablet, Rfl: 1    mupirocin (BACTROBAN) 2 % ointment, Apply topically daily for 7 days To open wound of left second toe, Disp: 22 g, Rfl: 0    Current Allergies     Allergies as of 02/12/2025    (No Known Allergies)            The following portions of the patient's history were reviewed and updated as appropriate: allergies, current medications, past family history, past medical history, past social history, past surgical history and problem list.     Past Medical History:   Diagnosis Date    Carotid artery occlusion     Disease of thyroid gland     Mixed hyperlipidemia     Renal mass        Past Surgical History:   Procedure Laterality Date    APPENDECTOMY      CARPAL TUNNEL RELEASE      CHOLECYSTECTOMY      COLONOSCOPY  2014 12 polyps     IR CRYOABLATION  10/20/2021    JOINT REPLACEMENT Left     TKR    JOINT REPLACEMENT Right     Total knee replacement     SHOULDER SURGERY Left     repair        Family History   Problem Relation Age of Onset    Breast cancer Mother     Heart disease Father     Stroke Father     Breast cancer Sister     Sudden death Brother         MVA    Obesity Brother     Diabetes Brother     Obesity Brother          Medications have been verified.        Objective   /68 (Patient Position: Sitting, Cuff Size: Large)   Pulse 87   Temp (!) 97.2 °F (36.2 °C) (Temporal)   Resp 16   Ht 5' 6\" (1.676 m)   Wt 113 kg (250 lb)   SpO2 99%   BMI 40.35 kg/m²   No LMP for male patient.       Physical Exam     Physical Exam  Constitutional:       General: He is not in acute distress.     Appearance: Normal appearance. He is not ill-appearing.   HENT:      Head: Normocephalic and atraumatic.      Nose: Nose normal.   Cardiovascular:      Rate and Rhythm: Normal rate and regular rhythm.   Pulmonary:      Effort: Pulmonary effort is normal.      Breath sounds: Normal breath sounds.   Musculoskeletal:      Right shoulder: Normal. Normal range " of motion. Normal strength. Normal pulse.      Left shoulder: No swelling, deformity, tenderness or bony tenderness. Decreased range of motion. Decreased strength. Normal pulse.      Cervical back: Normal range of motion. No rigidity or tenderness.      Comments: Decreased internal and external rotation of left shoulder with arm at side and 90 degree lateral abduction. Positive Empty Can test, Positive Neer's sign.   Skin:     General: Skin is warm and dry.   Neurological:      General: No focal deficit present.      Mental Status: He is alert and oriented to person, place, and time.

## 2025-02-18 DIAGNOSIS — E11.9 TYPE 2 DIABETES MELLITUS WITHOUT COMPLICATION, WITHOUT LONG-TERM CURRENT USE OF INSULIN (HCC): ICD-10-CM

## 2025-02-18 NOTE — TELEPHONE ENCOUNTER
Reason for call:   [x] Refill   [] Prior Auth  [] Other:     Office:   [x] PCP/Provider -   [] Specialty/Provider -     Medication: (Mounjaro) 12.5 MG/0.5ML SOAJ     Dose/Frequency: Inject 12.5 mg under the skin once a week,     Quantity: 2 mL    Pharmacy: [id number] - [place] -     Does the patient have enough for 3 days?   [x] Yes   [] No - Send as HP to POD

## 2025-02-20 RX ORDER — TIRZEPATIDE 12.5 MG/.5ML
12.5 INJECTION, SOLUTION SUBCUTANEOUS WEEKLY
Qty: 2 ML | Refills: 0 | Status: SHIPPED | OUTPATIENT
Start: 2025-02-20

## 2025-03-03 ENCOUNTER — OFFICE VISIT (OUTPATIENT)
Dept: OBGYN CLINIC | Facility: CLINIC | Age: 75
End: 2025-03-03
Payer: MEDICARE

## 2025-03-03 VITALS — BODY MASS INDEX: 38.57 KG/M2 | WEIGHT: 240 LBS | HEIGHT: 66 IN

## 2025-03-03 DIAGNOSIS — M24.812 INTERNAL DERANGEMENT OF LEFT SHOULDER: Primary | ICD-10-CM

## 2025-03-03 PROCEDURE — 99204 OFFICE O/P NEW MOD 45 MIN: CPT | Performed by: STUDENT IN AN ORGANIZED HEALTH CARE EDUCATION/TRAINING PROGRAM

## 2025-03-03 NOTE — PROGRESS NOTES
Ortho Sports Medicine Clinic Visit       Assessment & Plan  Internal derangement of left shoulder    Orders:    Ambulatory Referral to Orthopedic Surgery    MRI shoulder left wo contrast; Future    I reviewed the history, exam, and imaging with the patient in clinic today.  Patient states that he has had left shoulder pain and weakness for several months after an injury.  States that his symptoms have improved but he continues to have persistent weakness particularly with forward flexion and abduction with any weight in his hand.  On exam, the patient does have good shoulder range of motion.  He does have notable weakness with empty can.  I discussed with the patient that symptoms are most likely due to injury to the rotator cuff.  I did discuss the function of the rotator cuff in the shoulder and the implications of a rotator cuff tear.  I discussed potential treatment options with the patient focusing on conservative management at this point.  I discussed that either try physical therapy and an injection or get an MRI to evaluate the extent of rotator cuff pathology.  After discussing both options, patient and his wife would like to move forward with the left shoulder MRI to determine the extent of injury to the shoulder.  I discussed that further treatment will depend on the results of the MRI.  In the meantime, I encouraged him to continue to move the shoulder to minimize any risk of stiffness.  I will see the patient back in clinic after the MRI to discuss results and further treatment options. The patient demonstrated understanding of the discussion and was in agreement with the plan.  All of the questions were answered.  Patient can reach out to clinic with any questions or concerns at any time.    Follow up: MRI review  Imaging: none      Chief Complaint   Patient presents with    Left Shoulder - Pain         History of Present Illness:  The patient is a 74 y.o. RHD male seen in clinic for left shoulder  pain.  Patient states that his symptoms started several months ago.  States that he was sitting on a stool with wheels when it slipped out from under him and he fell landing on his left side.  He states that he had difficulty raising his left arm after the injury.  In clinic today, he localizes pain over the lateral aspect of the shoulder.  He states that he has no pain at rest but it is aggravated by overhead motion.  He also feels that he has weakness with forward flexion and abduction.  He has tried rest, ice, Voltaren gel, and Tylenol with symptomatic relief in terms of pain but not weakness.  He does feel that overall his shoulder is better.  He is able to raise it overhead against gravity but states that he is has difficulty lifting weight with the shoulder.  He did state that he had a prior surgery on the left shoulder over 10 years ago over the try to repair a torn muscle.  He states that he never made a full recovery from the surgery.    Occupation: retired  Sports/Activities: garage work, car work    Shoulder Surgical History:  Open left shoulder surgery over 10 years ago    Past Medical, Social and Family History:  Past Medical History:   Diagnosis Date    Carotid artery occlusion     Disease of thyroid gland     Mixed hyperlipidemia     Renal mass      Past Surgical History:   Procedure Laterality Date    APPENDECTOMY      CARPAL TUNNEL RELEASE      CHOLECYSTECTOMY      COLONOSCOPY  2014 12 polyps     IR CRYOABLATION  10/20/2021    JOINT REPLACEMENT Left     TKR    JOINT REPLACEMENT Right     Total knee replacement     SHOULDER SURGERY Left     repair      No Known Allergies  Current Outpatient Medications on File Prior to Visit   Medication Sig Dispense Refill    amLODIPine (NORVASC) 10 mg tablet TAKE 1 TABLET DAILY 90 tablet 1    NELLY ASPIRIN EC LOW DOSE PO Take 81 mg by mouth daily      ezetimibe (ZETIA) 10 mg tablet TAKE 1 TABLET DAILY 90 tablet 1    finasteride (PROSCAR) 5 mg tablet Take 1  tablet (5 mg total) by mouth daily 30 tablet 11    glucose blood test strip Use to test sugars four times daily CONTOUR TEST STRIP 100 each 5    hydroCHLOROthiazide 12.5 mg tablet TAKE 1 TABLET DAILY 90 tablet 1    Klor-Con M20 20 MEQ tablet TAKE 1 TABLET DAILY 90 tablet 3    metFORMIN (GLUCOPHAGE) 500 mg tablet TAKE 2 TABLETS IN THE MORNING AND 1 TABLET AT NIGHT 270 tablet 3    Multiple Vitamins-Minerals (CENTRUM SILVER 50+MEN PO) Centrum Silver      OneTouch Delica Lancets 33G MISC Use to test sugars three times daily. E11.9 100 each 5    pioglitazone (ACTOS) 15 mg tablet TAKE 1 TABLET DAILY 90 tablet 1    Probiotic Product (PROBIOTIC ADVANCED PO) Take by mouth      promethazine-dextromethorphan (PHENERGAN-DM) 6.25-15 mg/5 mL oral syrup Take 5 mL by mouth 4 (four) times a day as needed for cough 240 mL 0    rosuvastatin (CRESTOR) 5 mg tablet TAKE 1 TABLET DAILY 90 tablet 1    tamsulosin (FLOMAX) 0.4 mg Take 1 capsule (0.4 mg total) by mouth 2 (two) times a day 180 capsule 3    Tirzepatide (Mounjaro) 12.5 MG/0.5ML SOAJ Inject 12.5 mg under the skin once a week 2 mL 0    valsartan (DIOVAN) 160 mg tablet TAKE 1 TABLET DAILY 90 tablet 1    mupirocin (BACTROBAN) 2 % ointment Apply topically daily for 7 days To open wound of left second toe 22 g 0     No current facility-administered medications on file prior to visit.     Social History     Socioeconomic History    Marital status: /Civil Union     Spouse name: Not on file    Number of children: Not on file    Years of education: Not on file    Highest education level: Not on file   Occupational History    Occupation: [employer] in steady days    Tobacco Use    Smoking status: Never    Smokeless tobacco: Never    Tobacco comments:     Former smoker - As per [employer]    Vaping Use    Vaping status: Never Used   Substance and Sexual Activity    Alcohol use: Yes     Comment: occasionally    Drug use: Never    Sexual activity: Not Currently   Other Topics Concern     Not on file   Social History Narrative    Consumes on average 3 cups of regular coffee per day      Social Drivers of Health     Financial Resource Strain: Low Risk  (11/2/2023)    Overall Financial Resource Strain (CARDIA)     Difficulty of Paying Living Expenses: Not very hard   Food Insecurity: No Food Insecurity (11/8/2024)    Hunger Vital Sign     Worried About Running Out of Food in the Last Year: Never true     Ran Out of Food in the Last Year: Never true   Transportation Needs: No Transportation Needs (11/8/2024)    PRAPARE - Transportation     Lack of Transportation (Medical): No     Lack of Transportation (Non-Medical): No   Physical Activity: Not on file   Stress: Not on file   Social Connections: Not on file   Intimate Partner Violence: Not on file   Housing Stability: Low Risk  (11/8/2024)    Housing Stability Vital Sign     Unable to Pay for Housing in the Last Year: No     Number of Times Moved in the Last Year: 0     Homeless in the Last Year: No         I have reviewed the past medical, surgical, social and family history, medications and allergies as documented in the EMR.      Physical Exam:    Focused left shoulder exam:  No paracervical tenderness.   No cervical tenderness.   No pain with neck flexion, extension, side-to-side bending, or rotation.   Negative Spurling's bilaterally.    Inspection:  - Skin: intact, no erythema or ecchymosis, no open wounds  - Atrophy of supraspinatus or infraspinatus: no  - Scapular winging: no  - Scapular positioning: normal    Tenderness to Palpation:  - SC joint: no  - Clavicle: no  - Lateral aspect of acromion: no  - Posterior joint line: no  - AC joint: no  - Bicipital groove: no    Shoulder ROM:  - Passive forward flexion: 160 degrees  - Active forward flexion: 160 degrees  - Passive external rotation: 60 degrees  - Active external rotation: 60 degrees  - Internal rotation to: T10  - Passive internal rotation with 90 degrees abduction: 45 degrees  - Active  internal rotation with 90 degrees abduction: 45 degrees    Strength testing:  - Empty can: 4/5  - Resisted external rotation: 4+/5  - Resisted internal rotation: 5/5  - Belly press: negative  - Bear hug test: n/a  - Hornblower: n/a  - External rotation lag sign: negative    Impingement:  - Hawkin's impingement test: positive  - Neer impingement sign: positive    Biceps testing:  - Yeagerson: negative  - Speeds: negative    Stability:  - Apprehension sign: negative  - Relocation test: negative  - Anterior load and shift: negative  - Posterior load and shift: negative  - Kae test: negative  - Otsego test: negative  - Sulcus sign: negative      UE NV Exam:   +2 Radial pulses bilaterally.   Fingers are warm and well-perfused.  SILT C5-T1, SILT median, ulnar, radial nerve distributions  Radial/median/ulnar/PIN/AIN motor intact      Shoulder Imaging    Radiographs of the left shoulder were obtained on 2/12/2025 and reviewed with the patient.  Based on my independent evaluation, the imaging shows hardware from prior tendon repair.  Degenerative changes noted at the AC joint.      Scribe Attestation      I,:  Jamil Hebert PA-C am acting as a scribe while in the presence of the attending physician.:       I,:  Curt Mortensen MD personally performed the services described in this documentation    as scribed in my presence.:

## 2025-03-12 ENCOUNTER — HOSPITAL ENCOUNTER (OUTPATIENT)
Dept: MRI IMAGING | Facility: HOSPITAL | Age: 75
Discharge: HOME | End: 2025-03-12
Payer: MEDICARE

## 2025-03-12 DIAGNOSIS — M24.812 INTERNAL DERANGEMENT OF LEFT SHOULDER: ICD-10-CM

## 2025-03-12 PROCEDURE — 73221 MRI JOINT UPR EXTREM W/O DYE: CPT

## 2025-03-19 ENCOUNTER — OFFICE VISIT (OUTPATIENT)
Dept: OBGYN CLINIC | Facility: CLINIC | Age: 75
End: 2025-03-19
Payer: MEDICARE

## 2025-03-19 VITALS — BODY MASS INDEX: 39.05 KG/M2 | HEIGHT: 66 IN | WEIGHT: 243 LBS

## 2025-03-19 DIAGNOSIS — M75.102 NONTRAUMATIC TEAR OF LEFT SUPRASPINATUS TENDON: Primary | ICD-10-CM

## 2025-03-19 PROCEDURE — 99214 OFFICE O/P EST MOD 30 MIN: CPT | Performed by: STUDENT IN AN ORGANIZED HEALTH CARE EDUCATION/TRAINING PROGRAM

## 2025-03-19 NOTE — PROGRESS NOTES
[facility] Visit       Assessment & Plan  Nontraumatic tear of left supraspinatus tendon    Orders:    Ambulatory Referral to Physical Therapy; Future    I reviewed the history, exam, imaging with the patient include today.  I did review the patient's MRI of the left shoulder which unfortunately shows a retear of the supraspinatus tendon with retraction.  There is no significant atrophy.  Patient states is not having significant pain but is having some weakness in the shoulder.  I discussed potential treatment options the patient including conservative management versus surgical intervention.  Patient is not interested in revision rotator cuff surgery or shoulder arthroplasty at this time.  I discussed conservative options including medications, physical therapy, injections.  Patient likely is not having significant pain in the shoulder and was interested in starting physical therapy.  I discussed physical therapy can work on the remainder of the intact rotator cuff muscles to compensate for the torn tendon.  Patient will trial physical therapy with follow-up in 6 weeks for repeat evaluation. The patient demonstrated understanding of the discussion and was in agreement with the plan.  All of the questions were answered.  Patient can reach out to clinic with any questions or concerns at any time.      Follow up: 6 weeks  Imaging: none      Chief Complaint   Patient presents with    Left Shoulder - Follow-up     MRI results         History of Present Illness:  The patient is a 74 y.o. RHD male seen in clinic for left shoulder pain. He presents today to review the results of MRI left shoulder. He states he has minimal pain in the left shoulder. He feels that he has decent strength and range of motion. Patient states that he has trouble with lifting objects or picking up items with the left upper extremity. Patient would like to avoid corticosteroid injections at this point. He states he would like to  avoid another revision surgery or shoulder replacement at this point. He denies any numbness or tingling.     Prior history 3/3/2025:  Patient states that his symptoms started several months ago.  States that he was sitting on a stool with wheels when it slipped out from under him and he fell landing on his left side.  He states that he had difficulty raising his left arm after the injury.  In clinic today, he localizes pain over the lateral aspect of the shoulder.  He states that he has no pain at rest but it is aggravated by overhead motion.  He also feels that he has weakness with forward flexion and abduction.  He has tried rest, ice, Voltaren gel, and Tylenol with symptomatic relief in terms of pain but not weakness.  He does feel that overall his shoulder is better.  He is able to raise it overhead against gravity but states that he is has difficulty lifting weight with the shoulder.  He did state that he had a prior surgery on the left shoulder over 10 years ago over the try to repair a torn muscle.  He states that he never made a full recovery from the surgery.    Occupation: retired  Sports/Activities: garage work, car work    Shoulder Surgical History:  Open left shoulder surgery over 10 years ago    Past Medical, Social and Family History:  Past Medical History:   Diagnosis Date    Carotid artery occlusion     Disease of thyroid gland     Mixed hyperlipidemia     Renal mass      Past Surgical History:   Procedure Laterality Date    APPENDECTOMY      CARPAL TUNNEL RELEASE      CHOLECYSTECTOMY      COLONOSCOPY  2014    12 polyps     IR CRYOABLATION  10/20/2021    JOINT REPLACEMENT Left     TKR    JOINT REPLACEMENT Right     Total knee replacement     SHOULDER SURGERY Left     repair      No Known Allergies  Current Outpatient Medications on File Prior to Visit   Medication Sig Dispense Refill    amLODIPine (NORVASC) 10 mg tablet TAKE 1 TABLET DAILY 90 tablet 1    NELLY ASPIRIN EC LOW DOSE PO Take 81 mg by  mouth daily      ezetimibe (ZETIA) 10 mg tablet TAKE 1 TABLET DAILY 90 tablet 1    finasteride (PROSCAR) 5 mg tablet Take 1 tablet (5 mg total) by mouth daily 30 tablet 11    glucose blood test strip Use to test sugars four times daily CONTOUR TEST STRIP 100 each 5    hydroCHLOROthiazide 12.5 mg tablet TAKE 1 TABLET DAILY 90 tablet 1    Klor-Con M20 20 MEQ tablet TAKE 1 TABLET DAILY 90 tablet 3    metFORMIN (GLUCOPHAGE) 500 mg tablet TAKE 2 TABLETS IN THE MORNING AND 1 TABLET AT NIGHT 270 tablet 3    Multiple Vitamins-Minerals (CENTRUM SILVER 50+MEN PO) Centrum Silver      OneTouch Delica Lancets 33G MISC Use to test sugars three times daily. E11.9 100 each 5    pioglitazone (ACTOS) 15 mg tablet TAKE 1 TABLET DAILY 90 tablet 1    Probiotic Product (PROBIOTIC ADVANCED PO) Take by mouth      promethazine-dextromethorphan (PHENERGAN-DM) 6.25-15 mg/5 mL oral syrup Take 5 mL by mouth 4 (four) times a day as needed for cough 240 mL 0    rosuvastatin (CRESTOR) 5 mg tablet TAKE 1 TABLET DAILY 90 tablet 1    tamsulosin (FLOMAX) 0.4 mg Take 1 capsule (0.4 mg total) by mouth 2 (two) times a day 180 capsule 3    Tirzepatide (Mounjaro) 12.5 MG/0.5ML SOAJ Inject 12.5 mg under the skin once a week 2 mL 0    valsartan (DIOVAN) 160 mg tablet TAKE 1 TABLET DAILY 90 tablet 1    mupirocin (BACTROBAN) 2 % ointment Apply topically daily for 7 days To open wound of left second toe 22 g 0     No current facility-administered medications on file prior to visit.     Social History     Socioeconomic History    Marital status: /Civil Union     Spouse name: Not on file    Number of children: Not on file    Years of education: Not on file    Highest education level: Not on file   Occupational History    Occupation: [employer] in steady days    Tobacco Use    Smoking status: Never    Smokeless tobacco: Never    Tobacco comments:     Former smoker - As per [facility]    Vaping Use    Vaping status: Never Used   Substance and Sexual Activity     Alcohol use: Yes     Comment: occasionally    Drug use: Never    Sexual activity: Not Currently   Other Topics Concern    Not on file   Social History Narrative    Consumes on average 3 cups of regular coffee per day      Social Drivers of Health     Financial Resource Strain: Low Risk  (11/2/2023)    Overall Financial Resource Strain (CARDIA)     Difficulty of Paying Living Expenses: Not very hard   Food Insecurity: No Food Insecurity (11/8/2024)    Hunger Vital Sign     Worried About Running Out of Food in the Last Year: Never true     Ran Out of Food in the Last Year: Never true   Transportation Needs: No Transportation Needs (11/8/2024)    PRAPARE - Transportation     Lack of Transportation (Medical): No     Lack of Transportation (Non-Medical): No   Physical Activity: Not on file   Stress: Not on file   Social Connections: Not on file   Intimate Partner Violence: Not on file   Housing Stability: Low Risk  (11/8/2024)    Housing Stability Vital Sign     Unable to Pay for Housing in the Last Year: No     Number of Times Moved in the Last Year: 0     Homeless in the Last Year: No         I have reviewed the past medical, surgical, social and family history, medications and allergies as documented in the EMR.      Physical Exam:    Focused left shoulder exam:  No paracervical tenderness.   No cervical tenderness.   No pain with neck flexion, extension, side-to-side bending, or rotation.   Negative Spurling's bilaterally.    Inspection:  - Skin: intact, no erythema or ecchymosis, no open wounds  - Atrophy of supraspinatus or infraspinatus: no  - Scapular winging: no  - Scapular positioning: normal    Tenderness to Palpation:  - SC joint: no  - Clavicle: no  - Lateral aspect of acromion: no  - Posterior joint line: no  - AC joint: no  - Bicipital groove: no    Shoulder ROM:  - Passive forward flexion: 160 degrees  - Active forward flexion: 160 degrees  - Passive external rotation: 60 degrees  - Active external  rotation: 60 degrees  - Internal rotation to: T10  - Passive internal rotation with 90 degrees abduction: 45 degrees  - Active internal rotation with 90 degrees abduction: 45 degrees    Strength testing:  - Empty can: 4/5  - Resisted external rotation: 4+/5  - Resisted internal rotation: 5/5  - Belly press: negative  - Bear hug test: n/a  - Hornblower: n/a  - External rotation lag sign: negative    Impingement:  - Hawkin's impingement test: positive  - Neer impingement sign: positive    Biceps testing:  - Yeagerson: negative  - Speeds: negative    Stability:  - Apprehension sign: negative  - Relocation test: negative  - Anterior load and shift: negative  - Posterior load and shift: negative  - Kae test: negative  - [place] test: negative  - Sulcus sign: negative      UE NV Exam:   +2 Radial pulses bilaterally.   Fingers are warm and well-perfused.  SILT C5-T1, SILT median, ulnar, radial nerve distributions  Radial/median/ulnar/PIN/AIN motor intact      Shoulder Imaging    MRI of the left shoulder were obtained on 3/12/2025 and reviewed with the patient.  Based on my independent evaluation, the imaging shows a retear of the supraspinatus with retraction to the glenoid.    Radiographs of the left shoulder were obtained on 2/12/2025 and reviewed with the patient.  Based on my independent evaluation, the imaging shows hardware from prior tendon repair.  Degenerative changes noted at the AC joint.      Scribe Attestation      I,:   am acting as a scribe while in the presence of the attending physician.:       I,:   personally performed the services described in this documentation    as scribed in my presence.:

## 2025-03-30 NOTE — PROGRESS NOTES
PT Evaluation     Today's date: 2025  Patient name: Hay Dela Cruz  : 1950  MRN: [id number]  Referring provider: Jamil Hebert P*  Dx:   Encounter Diagnosis     ICD-10-CM    1. Nontraumatic tear of left supraspinatus tendon  M75.102 Ambulatory Referral to Physical Therapy      2. Chronic left shoulder pain  M25.512     G89.29                      Assessment    Assessment details: Problem List:  1) L shoulder strength deficits- addressed with neuro-re-education, therapeutic exercises/activities  2) Poor neuromuscular control of L shoulder- addressed with neuro-re-education, therapeutic exercises/activities  3) L shoulder hypomobility- addressed with manual therapy, neuro-re-education and therapeutic exercises  4) Activity Intolerance- addressed with therapeutic exercises/activities    Hay Dela Cruz is a pleasant 74 y.o. male who presents with chronic L shoulder pain.  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  No further referral appears necessary at this time based upon examination results.  I expect he will improve with progressive AROM and strengthening. Positive prognostic indicators include pleasant and positive attitude toward recovery. Negative prognostic indicators include L shoulder RTC tear, and diabetes Patient to benefit from skilled PT interventions to address aforementioned impairments to RTPLOF.      Comparable signs:  1) Lifting  2) L shoulder abd/ER/flex MMT    Goals  STG: (within 4-6 weeks)  1. Patient to reduce pain at its worst by at least 25%.  2. Patient to improve end range pain in L shoulder flexion/abduction/IR.  3. Patient to be able to be able to sleep on L side with 25% reduction in pain/discomfort.    LTG: (upon discharge)  1. Patient to be independent with HEP.  2. Patient to be able to manage symptoms independently.  3. Patient to be able to work  on cars with little to no difficulty.  4. Patient to restore full strength throughout L shoulder.    Plan  Planned modality interventions: neuromuscular electric stimulation, thermotherapy: hydrocollator packs, traction and cryotherapy    Planned therapy interventions: therapeutic exercise, therapeutic activities, manual therapy and neuromuscular re-education    Frequency: 1-2x week  Duration in weeks: 8  Plan of Care beginning date: 2025  Plan of Care expiration date: 2025  Treatment plan discussed with: patient  Plan details: Discussed POC and HEP with patient, patient agreeable to both. Reassess in 1 month.        Subjective Evaluation    History of Present Illness  Mechanism of injury: INJURY HISTORY: Hay Dela Cruz is a 74 y.o. male who presents to [facility] for initial evaluation, with cc of L shoulder pain.  Fell about 4-6 months ago onto L shoulder and since then has had trouble and pain in L shoulder when using it. Patient reports that he gets sharp pain down side of L shoulder when lifting objects and working on cars. Patient notes that resting makes the pain feel better and it gets worse when lifting or working on cars, as well as working on bird houses.  Patient Goals  Patient goals for therapy: increased motion, decreased pain, increased strength and independence with ADLs/IADLs  Patient goal: be able to work on cars and lift without having pain in L shoulder  Pain  Current pain ratin  At best pain ratin  At worst pain ratin  Location: L lat shoulder  Quality: sharp        Objective     Concurrent Complaints  Positive for disturbed sleep.     Active Range of Motion   Left Shoulder   Flexion: WFL and with pain  Abduction: WFL and with pain  External rotation BTH: T2 WFL and with pain  Internal rotation BTB: T10 with pain    Right Shoulder   Flexion: WFL  Abduction: WFL  External rotation BTH: WFL  Internal rotation BTB: WFL    Strength/Myotome Testing     Left Shoulder     Planes of  Motion   Flexion: 4- (pain)   Extension: 5   Abduction: 4- (pain)   Adduction: 5   External rotation at 0°: 4- (pain)   Internal rotation at 0°: 5     Right Shoulder     Planes of Motion   Flexion: 5   Extension: 5   Abduction: 4   Adduction: 5   External rotation at 0°: 5   Internal rotation at 0°: 5     Left Elbow   Flexion: 5  Extension: 5    Right Elbow   Flexion: 5  Extension: 5    Tests     Left Shoulder   Positive full can and painful arc.              Precautions:   POC: 6/1/25  Re-Eval: 5/1/25  Access Code:     Manuals 4/1                                       Neuro Re-Ed        Scap ret :05x10       Scap ret w ER RTB x10       UBE (posture correction)        Wall walks flex/scap                                Ther Ex        Row/Ext        Standing Y/T/I        Serratus punch        Bicep curls        Tricep pulldown                                Ther Activity                                                Gait Training                        Modalities                        Reviewed patient scheduling and HEP.

## 2025-03-31 DIAGNOSIS — E11.9 TYPE 2 DIABETES MELLITUS WITHOUT COMPLICATION, WITHOUT LONG-TERM CURRENT USE OF INSULIN (HCC): ICD-10-CM

## 2025-03-31 RX ORDER — TIRZEPATIDE 12.5 MG/.5ML
12.5 INJECTION, SOLUTION SUBCUTANEOUS WEEKLY
Qty: 2 ML | Refills: 3 | Status: SHIPPED | OUTPATIENT
Start: 2025-03-31

## 2025-03-31 NOTE — TELEPHONE ENCOUNTER
Reason for call:   [x] Refill   [] Prior Auth  [] Other:     Office:   [x] PCP/Provider - : [facility]  Authorized By: BRANDI Mayer  [] Specialty/Provider -     Medication:     Tirzepatide (Mounjaro) 12.5 MG/0.5ML SOAJ         Pharmacy: [id number] - [place] - [address] [place] [address] [phone]     Local Pharmacy   Does the patient have enough for 3 days?   [] Yes   [x] No - Send as HP to POD    Mail Away Pharmacy   Does the patient have enough for 10 days?   [] Yes   [] No - Send as HP to POD

## 2025-04-01 ENCOUNTER — EVALUATION (OUTPATIENT)
Dept: PHYSICAL THERAPY | Facility: CLINIC | Age: 75
End: 2025-04-01
Payer: MEDICARE

## 2025-04-01 VITALS — SYSTOLIC BLOOD PRESSURE: 109 MMHG | HEART RATE: 70 BPM | DIASTOLIC BLOOD PRESSURE: 69 MMHG

## 2025-04-01 DIAGNOSIS — M25.512 CHRONIC LEFT SHOULDER PAIN: Primary | ICD-10-CM

## 2025-04-01 DIAGNOSIS — G89.29 CHRONIC LEFT SHOULDER PAIN: Primary | ICD-10-CM

## 2025-04-01 DIAGNOSIS — M75.102 NONTRAUMATIC TEAR OF LEFT SUPRASPINATUS TENDON: ICD-10-CM

## 2025-04-01 PROCEDURE — 97112 NEUROMUSCULAR REEDUCATION: CPT

## 2025-04-01 PROCEDURE — 97162 PT EVAL MOD COMPLEX 30 MIN: CPT

## 2025-04-07 ENCOUNTER — OFFICE VISIT (OUTPATIENT)
Dept: PHYSICAL THERAPY | Facility: CLINIC | Age: 75
End: 2025-04-07
Payer: MEDICARE

## 2025-04-07 DIAGNOSIS — G89.29 CHRONIC LEFT SHOULDER PAIN: ICD-10-CM

## 2025-04-07 DIAGNOSIS — M75.102 NONTRAUMATIC TEAR OF LEFT SUPRASPINATUS TENDON: Primary | ICD-10-CM

## 2025-04-07 DIAGNOSIS — M25.512 CHRONIC LEFT SHOULDER PAIN: ICD-10-CM

## 2025-04-07 PROCEDURE — 97112 NEUROMUSCULAR REEDUCATION: CPT

## 2025-04-07 PROCEDURE — 97110 THERAPEUTIC EXERCISES: CPT

## 2025-04-07 NOTE — PROGRESS NOTES
"Daily Note     Today's date: 2025  Patient name: Hay Dela Cruz  : 1950  MRN: [id number]  Referring provider: Jamil Hebert P*  Dx:   Encounter Diagnosis     ICD-10-CM    1. Nontraumatic tear of left supraspinatus tendon  M75.102       2. Chronic left shoulder pain  M25.512     G89.29                      Subjective:  Hay reports that his shoulder feels the same.  He notes some soreness this morning from sleeping.  Reports compliance with HEP issued last visit.       Objective: See treatment diary below      Assessment: Patient tolerated treatment well. Patient performed ex as noted with direct supervision.  Provided cues to ensure good ex technique and decrease compensatory movements.  Patient reported fatigue without soreness or pain post ex performed.  Encouraged the patient to continue with his current HEP.  Patient would benefit from continued PT intervention to address deficits and attain set goals.       Plan: Continue per plan of care.      Precautions:   POC: 25  Re-Eval: 25  Access Code:     Manuals                                       Neuro Re-Ed        Scap ret :05x10 :05 x15      Scap ret w ER RTB x10 RTB x15      UBE (posture correction)  3'/3' L1 cues for posture      Wall walks flex/scap  5\"x10 flex                              Ther Ex        Row/Ext  Green row  5\" x15  Red ext  5\" x15      Standing Y/T/I        Serratus punch        Bicep curls  4# 2x10 against wall for posture cue      Tricep pulldown  GTB  2x10                                  Ther Activity                                                Gait Training                        Modalities                        Reviewed patient scheduling and HEP.        "

## 2025-04-10 ENCOUNTER — OFFICE VISIT (OUTPATIENT)
Dept: PHYSICAL THERAPY | Facility: CLINIC | Age: 75
End: 2025-04-10
Payer: MEDICARE

## 2025-04-10 DIAGNOSIS — M75.102 NONTRAUMATIC TEAR OF LEFT SUPRASPINATUS TENDON: Primary | ICD-10-CM

## 2025-04-10 DIAGNOSIS — G89.29 CHRONIC LEFT SHOULDER PAIN: ICD-10-CM

## 2025-04-10 DIAGNOSIS — M25.512 CHRONIC LEFT SHOULDER PAIN: ICD-10-CM

## 2025-04-10 PROCEDURE — 97112 NEUROMUSCULAR REEDUCATION: CPT

## 2025-04-10 PROCEDURE — 97110 THERAPEUTIC EXERCISES: CPT

## 2025-04-10 NOTE — PROGRESS NOTES
"Daily Note     Today's date: 4/10/2025  Patient name: Hay Dela Cruz  : 1950  MRN: [id number]  Referring provider: Jamil Hebert P*  Dx:   Encounter Diagnosis     ICD-10-CM    1. Nontraumatic tear of left supraspinatus tendon  M75.102       2. Chronic left shoulder pain  M25.512     G89.29                      Subjective: Patient states he has no pain presently, but last night he had pain in both shoulders sleeping.       Objective: See treatment diary below    Patient educated on hug pillow to open up shoulder space for comfort. His home exercise program updated to include additional exercises. Handout issued and explained with demonstration. He accepts new exercises.     Assessment: Tolerated treatment well. Patient would benefit from continued PT for stretching and strengthening. He was able to add exercises to his program with little difficulty. Few verbal cues needed for proper performance of exercises. He seemed to understand all education given throughout session. Patient felt a little fatigued with in the left shoulder during wall ladder exercise. He felt ok leaving department.       Plan: Continue per plan of care.  Progress treatment as tolerated.       Precautions:   POC: 25  Re-Eval: 25  Access Code: [email or web address]    Manuals 4/1 4/7 4/10                                     Neuro Re-Ed        Scap ret :05x10 :05 x15 :05x15     Scap ret w ER RTB x10 RTB x15 Red x15     UBE (posture correction)  3'/3' L1 cues for posture L1 x6 min     Wall walks flex/scap  5\"x10 flex st                             Ther Ex        Row/Ext  Green row  5\" x15  Red ext  5\" x15 Green x15 ea     Standing Y/T/I   X15 ea     Serratus punch        Bicep curls  4# 2x10 against wall for posture cue 4# x10 ea 3 dir     Tricep pulldown  GTB  2x10     Green x15                             Ther Activity                                                Gait Training                        Modalities                      "     Access Code: [email or web address]  URL: [email or web address]  Date: 04/10/2025  Prepared by: Kamila Virgen    Exercises  - Standing Shoulder Flexion Wall Walk  - 2 x daily - 7 x weekly - 1 sets - 10 reps  - Standing Shoulder Abduction Finger Walk at Wall  - 2 x daily - 7 x weekly - 1 sets - 10 reps  - Standing Shoulder Row with Anchored Resistance  - 2 x daily - 7 x weekly - 1 sets - 10 reps - 3 sec hold  - Shoulder extension with resistance - Neutral  - 2 x daily - 7 x weekly - 1 sets - 10 reps - 3 sec hold  - Low Trap Setting at Wall  - 2 x daily - 7 x weekly - 1 sets - 10 reps  - Standing Elbow Extension with Anchored Resistance at Wall  - 2 x daily - 7 x weekly - 1 sets - 10 reps - 3 sec hold

## 2025-04-14 ENCOUNTER — OFFICE VISIT (OUTPATIENT)
Dept: PHYSICAL THERAPY | Facility: CLINIC | Age: 75
End: 2025-04-14
Payer: MEDICARE

## 2025-04-14 DIAGNOSIS — M25.512 CHRONIC LEFT SHOULDER PAIN: ICD-10-CM

## 2025-04-14 DIAGNOSIS — M75.102 NONTRAUMATIC TEAR OF LEFT SUPRASPINATUS TENDON: Primary | ICD-10-CM

## 2025-04-14 DIAGNOSIS — G89.29 CHRONIC LEFT SHOULDER PAIN: ICD-10-CM

## 2025-04-14 PROCEDURE — 97110 THERAPEUTIC EXERCISES: CPT

## 2025-04-14 PROCEDURE — 97112 NEUROMUSCULAR REEDUCATION: CPT

## 2025-04-14 NOTE — PROGRESS NOTES
"Daily Note     Today's date: 2025  Patient name: Hay Dela Cruz  : 1950  MRN: [id number]  Referring provider: Jamil Hebert P*  Dx:   Encounter Diagnosis     ICD-10-CM    1. Nontraumatic tear of left supraspinatus tendon  M75.102       2. Chronic left shoulder pain  M25.512     G89.29           Start Time: 08  Stop Time: 08  Total time in clinic (min): 38 minutes    Subjective: Feeling good, had some pain yesterday in L shoulder after lifting air conditioner. Notes it was about 60lbs.      Objective: See treatment diary below      Assessment: Patient demonstrates improved strength through tolerance of increased resistance of RTC and periscapular strengthening exercises. Notes fatigue with standing y/t/I with lift offs. Able to complete all exercises without increased symptoms at rest. Patient to continue to benefit from skilled interventions to improve function.      Plan: Continue per plan of care.  Progress treatment as tolerated.       Precautions:   POC: 25  Re-Eval: 25  Access Code: [email or web address]    Manuals 4/1 4/7 4/10 4/14                                    Neuro Re-Ed        Scap ret :05x10 :05 x15 :05x15     Scap ret w ER RTB x10 RTB x15 Red x15 Red doubled  x15    UBE (posture correction)  3'/3' L1 cues for posture L1 x6 min L1 x 6 min ALT    Wall walks flex/scap  5\"x10 flex st                             Ther Ex        Row/Ext  Green row  5\" x15  Red ext  5\" x15 Green x15 ea CELIA   2x15 ea    Standing Y/T/I   X15 ea X15 ea w lift off    Serratus punch    :03x10 b/l    Bicep curls  4# 2x10 against wall for posture cue 4# x10 ea 3 dir 5# x15 ea 3 dir  Back against wall    Tricep pulldown  GTB  2x10     Green x15 CELIA x15                            Ther Activity                                                Gait Training                        Modalities                          Access Code: [email or web address]  URL: [email or web address]  Date: 04/10/2025  Prepared by: Kamila " Frohnheiser    Exercises  - Standing Shoulder Flexion Wall Walk  - 2 x daily - 7 x weekly - 1 sets - 10 reps  - Standing Shoulder Abduction Finger Walk at Wall  - 2 x daily - 7 x weekly - 1 sets - 10 reps  - Standing Shoulder Row with Anchored Resistance  - 2 x daily - 7 x weekly - 1 sets - 10 reps - 3 sec hold  - Shoulder extension with resistance - Neutral  - 2 x daily - 7 x weekly - 1 sets - 10 reps - 3 sec hold  - Low Trap Setting at Wall  - 2 x daily - 7 x weekly - 1 sets - 10 reps  - Standing Elbow Extension with Anchored Resistance at Wall  - 2 x daily - 7 x weekly - 1 sets - 10 reps - 3 sec hold

## 2025-04-17 ENCOUNTER — OFFICE VISIT (OUTPATIENT)
Dept: PHYSICAL THERAPY | Facility: CLINIC | Age: 75
End: 2025-04-17
Payer: MEDICARE

## 2025-04-17 DIAGNOSIS — M25.512 CHRONIC LEFT SHOULDER PAIN: ICD-10-CM

## 2025-04-17 DIAGNOSIS — M75.102 NONTRAUMATIC TEAR OF LEFT SUPRASPINATUS TENDON: Primary | ICD-10-CM

## 2025-04-17 DIAGNOSIS — G89.29 CHRONIC LEFT SHOULDER PAIN: ICD-10-CM

## 2025-04-17 PROCEDURE — 97110 THERAPEUTIC EXERCISES: CPT

## 2025-04-17 PROCEDURE — 97112 NEUROMUSCULAR REEDUCATION: CPT

## 2025-04-17 NOTE — PROGRESS NOTES
"Daily Note     Today's date: 2025  Patient name: Hay Dela Cruz  : 1950  MRN: [id number]  Referring provider: Jamil Hebert P*  Dx:   Encounter Diagnosis     ICD-10-CM    1. Nontraumatic tear of left supraspinatus tendon  M75.102       2. Chronic left shoulder pain  M25.512     G89.29           Start Time: 0815          Subjective: Patient states he has a little pain (3/10) in the left shoulder today.       Objective: See treatment diary below      Assessment: Tolerated treatment well. Patient would benefit from continued PT for stretching and strengthening. He was able to increase some of the exercises during session. Patient felt looser by the end of the session.       Plan: Continue per plan of care.  Progress treatment as tolerated.       Precautions:   POC: 25  Re-Eval: 25  Access Code: [email or web address]    Manuals 4/1 4/7 4/10 4/14 4/17                                   Neuro Re-Ed        Scap ret :05x10 :05 x15 :05x15     Scap ret w ER RTB x10 RTB x15 Red x15 Red doubled  x15 Red double x15   UBE (posture correction)  3'/3' L1 cues for posture L1 x6 min L1 x 6 min ALT L1 x6 min stand   Wall walks flex/scap  5\"x10 flex                              Ther Ex        Row/Ext  Green row  5\" x15  Red ext  5\" x15 Green x15 ea CELIA   2x15 ea Celia 2x15   Standing Y/T/I   X15 ea X15 ea w lift off X15 I,Y w/ lift ;Tx10   Serratus punch    :03x10 b/l :03 x 15 B/L   Bicep curls  4# 2x10 against wall for posture cue 4# x10 ea 3 dir 5# x15 ea 3 dir  Back against wall 5# x15 ea 3 dir  Back against wall   Tricep pulldown  GTB  2x10     Green x15 CELIA x15 Celia x15                           Ther Activity                                                Gait Training                        Modalities                          Access Code: [email or web address]  URL: [email or web address]  Date: 04/10/2025  Prepared by: Kamila Virgen    Exercises  - Standing Shoulder Flexion Wall Walk  - 2 x daily - 7 x weekly - 1 " sets - 10 reps  - Standing Shoulder Abduction Finger Walk at Wall  - 2 x daily - 7 x weekly - 1 sets - 10 reps  - Standing Shoulder Row with Anchored Resistance  - 2 x daily - 7 x weekly - 1 sets - 10 reps - 3 sec hold  - Shoulder extension with resistance - Neutral  - 2 x daily - 7 x weekly - 1 sets - 10 reps - 3 sec hold  - Low Trap Setting at Wall  - 2 x daily - 7 x weekly - 1 sets - 10 reps  - Standing Elbow Extension with Anchored Resistance at Wall  - 2 x daily - 7 x weekly - 1 sets - 10 reps - 3 sec hold

## 2025-04-21 ENCOUNTER — OFFICE VISIT (OUTPATIENT)
Dept: PHYSICAL THERAPY | Facility: CLINIC | Age: 75
End: 2025-04-21
Payer: MEDICARE

## 2025-04-21 DIAGNOSIS — M25.512 CHRONIC LEFT SHOULDER PAIN: ICD-10-CM

## 2025-04-21 DIAGNOSIS — G89.29 CHRONIC LEFT SHOULDER PAIN: ICD-10-CM

## 2025-04-21 DIAGNOSIS — M75.102 NONTRAUMATIC TEAR OF LEFT SUPRASPINATUS TENDON: Primary | ICD-10-CM

## 2025-04-21 PROCEDURE — 97110 THERAPEUTIC EXERCISES: CPT

## 2025-04-21 PROCEDURE — 97112 NEUROMUSCULAR REEDUCATION: CPT

## 2025-04-21 NOTE — PROGRESS NOTES
"Daily Note     Today's date: 2025  Patient name: Hay Dela Cruz  : 1950  MRN: [id number]  Referring provider: Jamil Hebert P*  Dx:   Encounter Diagnosis     ICD-10-CM    1. Nontraumatic tear of left supraspinatus tendon  M75.102       2. Chronic left shoulder pain  M25.512     G89.29           Start Time: 0815  Stop Time: 0900  Total time in clinic (min): 45 minutes    Subjective: L shoulder still painful when he sleeps on it and when he does something heavy lifting.      Objective: See treatment diary below      Assessment: Patient was able to complete all exercises with minor discomfort with new and challenging exercises in the first couple reps that dissipates with each repetition. Patient notes fatigue at end of session. Instructed to add resisted IR/ER for HEP. Patient to continue to benefit from skilled interventions to improve function.      Plan: Continue per plan of care.  Progress treatment as tolerated.       Precautions:   POC: 25  Re-Eval: 25  Access Code: [email or web address]    Manuals 4/21 4/7 4/10 4/14 4/17                                   Neuro Re-Ed        Scap ret :05x10 :05 x15 :05x15     Scap ret w ER RTB x10 RTB x15 Red x15 Red doubled  x15 Red double x15   UBE (posture correction) L1 x 6 min standing 3'/3' L1 cues for posture L1 x6 min L1 x 6 min ALT L1 x6 min stand   Wall walks flex/scap  5\"x10 flex      IR/ER GTB x15                       Ther Ex        Row/Ext  Green row  5\" x15  Red ext  5\" x15 Green x15 ea JULIO   2x15 ea Julio 2x15   Standing Y/T/I X15 ea  X15 ea X15 ea w lift off X15 I,Y w/ lift ;Tx10   Serratus punch    :03x10 b/l :03 x 15 B/L   Bicep curls #7   X15 bicep  X10 brach 4# 2x10 against wall for posture cue 4# x10 ea 3 dir 5# x15 ea 3 dir  Back against wall 5# x15 ea 3 dir  Back against wall   Tricep pulldown  GTB  2x10     Green x15 JULIO x15 Julio x15   HOIST  Press  Row/Walk  Lat Ext   #3 2x10  #2 2x5  #3 2x10                       Ther Activity              "                                   Gait Training                        Modalities                          Access Code: [email or web address]  URL: [email or web address]  Date: 04/10/2025  Prepared by: Kamila Virgen    Exercises  - Standing Shoulder Flexion Wall Walk  - 2 x daily - 7 x weekly - 1 sets - 10 reps  - Standing Shoulder Abduction Finger Walk at Wall  - 2 x daily - 7 x weekly - 1 sets - 10 reps  - Standing Shoulder Row with Anchored Resistance  - 2 x daily - 7 x weekly - 1 sets - 10 reps - 3 sec hold  - Shoulder extension with resistance - Neutral  - 2 x daily - 7 x weekly - 1 sets - 10 reps - 3 sec hold  - Low Trap Setting at Wall  - 2 x daily - 7 x weekly - 1 sets - 10 reps  - Standing Elbow Extension with Anchored Resistance at Wall  - 2 x daily - 7 x weekly - 1 sets - 10 reps - 3 sec hold

## 2025-04-24 ENCOUNTER — OFFICE VISIT (OUTPATIENT)
Dept: PHYSICAL THERAPY | Facility: CLINIC | Age: 75
End: 2025-04-24
Payer: MEDICARE

## 2025-04-24 DIAGNOSIS — G89.29 CHRONIC LEFT SHOULDER PAIN: ICD-10-CM

## 2025-04-24 DIAGNOSIS — M75.102 NONTRAUMATIC TEAR OF LEFT SUPRASPINATUS TENDON: Primary | ICD-10-CM

## 2025-04-24 DIAGNOSIS — M25.512 CHRONIC LEFT SHOULDER PAIN: ICD-10-CM

## 2025-04-24 PROCEDURE — 97110 THERAPEUTIC EXERCISES: CPT

## 2025-04-24 PROCEDURE — 97112 NEUROMUSCULAR REEDUCATION: CPT

## 2025-04-24 NOTE — PROGRESS NOTES
"Daily Note     Today's date: 2025  Patient name: Hay Dela Cruz  : 1950  MRN: [id number]  Referring provider: Jamil Hebert P*  Dx:   Encounter Diagnosis     ICD-10-CM    1. Nontraumatic tear of left supraspinatus tendon  M75.102       2. Chronic left shoulder pain  M25.512     G89.29           Start Time: 0815          Subjective: Patient states he has no pain today.       Objective: See treatment diary below      Assessment: Tolerated treatment well. Patient would benefit from continued PT for strengthening. He was able to increase and adjust exercises during program. I little burning with exercises, but the muscles do get \"tired\" with activities. Patient felt \"ok\" leaving department.       Plan: Continue per plan of care.  Progress treatment as tolerated.       Precautions:   POC: 25  Re-Eval: 25  Access Code: [email or web address]    Manuals 4/21 4/24 4/10 4/14 4/17                                   Neuro Re-Ed        Scap ret :05x10 :05x10 :05x15     Scap ret w ER RTB x10 RTB 2x10 Red x15 Red doubled  x15 Red double x15   UBE (posture correction) L1 x 6 min standing L1 x8 min stand L1 x6 min L1 x 6 min ALT L1 x6 min stand   Wall walks flex/scap        IR/ER GTB x15 GTB 2x10                      Ther Ex        Row/Ext   Green x15 ea JULIO   2x15 ea Julio 2x15   Standing Y/T/I X15 ea X15 ea  X15 ea X15 ea w lift off X15 I,Y w/ lift ;Tx10   Serratus punch    :03x10 b/l :03 x 15 B/L   Bicep curls #7   X15 bicep  X10 brach 7#  X10 ea dir 4# x10 ea 3 dir 5# x15 ea 3 dir  Back against wall 5# x15 ea 3 dir  Back against wall   Tricep pulldown   Green x15 JULIO x15 Julio x15   HOIST  Press  Row/Walk  Lat Ext   #3 2x10  #2 2x5  #3 2x10   3pl 2x10  2pl 3x5  3pl 2x10                      Ther Activity                                                Gait Training                        Modalities                          Access Code: [email or web address]  URL: [email or web address]  Date: 04/10/2025  Prepared by: " Kamila Virgen    Exercises  - Standing Shoulder Flexion Wall Walk  - 2 x daily - 7 x weekly - 1 sets - 10 reps  - Standing Shoulder Abduction Finger Walk at Wall  - 2 x daily - 7 x weekly - 1 sets - 10 reps  - Standing Shoulder Row with Anchored Resistance  - 2 x daily - 7 x weekly - 1 sets - 10 reps - 3 sec hold  - Shoulder extension with resistance - Neutral  - 2 x daily - 7 x weekly - 1 sets - 10 reps - 3 sec hold  - Low Trap Setting at Wall  - 2 x daily - 7 x weekly - 1 sets - 10 reps  - Standing Elbow Extension with Anchored Resistance at Wall  - 2 x daily - 7 x weekly - 1 sets - 10 reps - 3 sec hold

## 2025-04-28 ENCOUNTER — EVALUATION (OUTPATIENT)
Dept: PHYSICAL THERAPY | Facility: CLINIC | Age: 75
End: 2025-04-28
Payer: MEDICARE

## 2025-04-28 DIAGNOSIS — M25.512 CHRONIC LEFT SHOULDER PAIN: ICD-10-CM

## 2025-04-28 DIAGNOSIS — G89.29 CHRONIC LEFT SHOULDER PAIN: ICD-10-CM

## 2025-04-28 DIAGNOSIS — M75.102 NONTRAUMATIC TEAR OF LEFT SUPRASPINATUS TENDON: Primary | ICD-10-CM

## 2025-04-28 PROCEDURE — 97110 THERAPEUTIC EXERCISES: CPT

## 2025-04-28 PROCEDURE — 97112 NEUROMUSCULAR REEDUCATION: CPT

## 2025-04-28 NOTE — PROGRESS NOTES
PT Re-Evaluation  and PT Discharge    Today's date: 2025  Patient name: Hay Dela Cruz  : 1950  MRN: [id number]  Referring provider: Jamil Hebert P*  Dx:   Encounter Diagnosis     ICD-10-CM    1. Nontraumatic tear of left supraspinatus tendon  M75.102       2. Chronic left shoulder pain  M25.512     G89.29             Start Time: 0815  Stop Time: 853  Total time in clinic (min): 38 minutes    Assessment    Assessment details: Hay Dela Cruz is a pleasant 74 y.o. male who presents for re-evaluation and discharge with chronic L shoulder pain.  he has resolved L shoulder neuromuscular control and overall improved L shoulder mobility and strength leading to improvement in function and ability to lay on L side without pain. Patient demonstrates full independence with exercises and would benefit from discharge from formal OPPT and transition to HEP to continue to independently manage symptoms.      Goals  STG: (within 4-6 weeks)  1. Patient to reduce pain at its worst by at least 25%. MET  2. Patient to improve end range pain in L shoulder flexion/abduction/IR. MET  3. Patient to be able to be able to sleep on L side with 25% reduction in pain/discomfort. MET    LTG: (upon discharge)  1. Patient to be independent with HEP. MET  2. Patient to be able to manage symptoms independently. MET  3. Patient to be able to work on cars with little to no difficulty. MET  4. Patient to restore full strength throughout L shoulder. MET    Plan  Planned modality interventions: neuromuscular electric stimulation, thermotherapy: hydrocollator packs, traction and cryotherapy    Planned therapy interventions: therapeutic exercise, therapeutic activities, manual therapy and neuromuscular re-education    Frequency: 1-2x week  Duration in weeks: 8  Plan of Care beginning date: 2025  Plan of Care expiration date: 2025  Treatment plan discussed with: patient  Plan details: Discussed POC and HEP with patient, patient  agreeable to both. Reassess in 1 month.        Subjective Evaluation    History of Present Illness  Mechanism of injury: SUBJECTIVE 25: Patient reports that over the last week his shoulder has felt pretty good and pain has been much better. He is able to manage his activities at home without much difficulty or discomfort. Pain while laying on his L side has also improved significantly. Is independent with home exercises and wishes to discharge home to [facility].    INJURY HISTORY: Hay Dela Cruz is a 74 y.o. male who presents to OPPT for initial evaluation, with cc of L shoulder pain.  Fell about 4-6 months ago onto L shoulder and since then has had trouble and pain in L shoulder when using it. Patient reports that he gets sharp pain down side of L shoulder when lifting objects and working on cars. Patient notes that resting makes the pain feel better and it gets worse when lifting or working on cars, as well as working on bird houses.  Patient Goals  Patient goals for therapy: increased motion, decreased pain, increased strength and independence with ADLs/IADLs  Patient goal: be able to work on cars and lift without having pain in L shoulder  Pain  Current pain ratin  At best pain ratin  At worst pain ratin  Location: L lat shoulder  Quality: sharp        Objective     Concurrent Complaints  Negative for disturbed sleep    Active Range of Motion   Left Shoulder   Flexion: WFL  Abduction: WFL  External rotation BTH: WFL  Internal rotation BTB: WFL    Right Shoulder   Flexion: WFL  Abduction: WFL  External rotation BTH: WFL  Internal rotation BTB: WFL    Strength/Myotome Testing     Left Shoulder     Planes of Motion   Flexion: 5 (pain)   Extension: 5   Abduction: 5 (pain)   Adduction: 5   External rotation at 0°: 5 (pain)   Internal rotation at 0°: 5     Right Shoulder     Planes of Motion   Flexion: 5   Extension: 5   Abduction: 5   Adduction: 5   External rotation at 0°: 5   Internal rotation at 0°: 5      Left Elbow   Flexion: 5  Extension: 5    Right Elbow   Flexion: 5  Extension: 5    Tests     Left Shoulder   Negative full can and painful arc.              Precautions:   POC: 6/1/25  Re-Eval: 5/1/25  Access Code: [email or web address]    Manuals 4/21 4/24 4/28 4/14 4/17                                   Neuro Re-Ed        Scap ret :05x10 :05x10      Scap ret w ER RTB x10 RTB 2x10 GRN x15 Red doubled  x15 Red double x15   UBE (posture correction) L1 x 6 min standing L1 x8 min stand L1 x6 min L1 x 6 min ALT L1 x6 min stand   Wall walks flex/scap        IR/ER GTB x15 GTB 2x10 GTB 2x10                     Ther Ex        Row/Ext   BLK x15 ea JULIO   2x15 ea Julio 2x15   Standing Y/T/I X15 ea X15 ea  X15 ea X15 ea w lift off X15 I,Y w/ lift ;Tx10   Serratus punch    :03x10 b/l :03 x 15 B/L   Bicep curls #7   X15 bicep  X10 brach 7#  X10 ea dir 7# x10 ea 3 dir 5# x15 ea 3 dir  Back against wall 5# x15 ea 3 dir  Back against wall   Tricep pulldown    JULIO x15 Julio x15   HOIST  Press  Row/Walk  Lat Ext   #3 2x10  #2 2x5  #3 2x10   3pl 2x10  2pl 3x5  3pl 2x10   4plt 2x10  3plt x10  3plt 2x10                     Ther Activity                                                Gait Training                        Modalities                          Access Code: [email or web address]  URL: [email or web address]  Date: 04/10/2025  Prepared by: Kamila Virgen    Exercises  - Standing Shoulder Flexion Wall Walk  - 2 x daily - 7 x weekly - 1 sets - 10 reps  - Standing Shoulder Abduction Finger Walk at Wall  - 2 x daily - 7 x weekly - 1 sets - 10 reps  - Standing Shoulder Row with Anchored Resistance  - 2 x daily - 7 x weekly - 1 sets - 10 reps - 3 sec hold  - Shoulder extension with resistance - Neutral  - 2 x daily - 7 x weekly - 1 sets - 10 reps - 3 sec hold  - Low Trap Setting at Wall  - 2 x daily - 7 x weekly - 1 sets - 10 reps  - Standing Elbow Extension with Anchored Resistance at Wall  - 2 x daily - 7 x weekly - 1 sets - 10 reps - 3 sec  ruben  Reviewed patient scheduling and HEP.

## 2025-04-29 ENCOUNTER — APPOINTMENT (OUTPATIENT)
Dept: LAB | Facility: CLINIC | Age: 75
End: 2025-04-29
Attending: NURSE PRACTITIONER
Payer: MEDICARE

## 2025-04-29 DIAGNOSIS — Z12.5 PROSTATE CANCER SCREENING: ICD-10-CM

## 2025-04-29 DIAGNOSIS — N28.9 RENAL LESION: ICD-10-CM

## 2025-04-29 DIAGNOSIS — N28.89 BILATERAL RENAL MASSES: ICD-10-CM

## 2025-04-29 DIAGNOSIS — E11.9 TYPE 2 DIABETES MELLITUS WITHOUT COMPLICATION, WITHOUT LONG-TERM CURRENT USE OF INSULIN (HCC): ICD-10-CM

## 2025-04-29 DIAGNOSIS — C64.9 RENAL CELL CARCINOMA, UNSPECIFIED LATERALITY (HCC): ICD-10-CM

## 2025-04-29 DIAGNOSIS — R91.1 PULMONARY NODULE: ICD-10-CM

## 2025-04-29 DIAGNOSIS — E78.2 MIXED HYPERLIPIDEMIA: ICD-10-CM

## 2025-04-29 LAB
ALBUMIN SERPL BCG-MCNC: 4.2 G/DL (ref 3.5–5)
ALP SERPL-CCNC: 77 U/L (ref 34–104)
ALT SERPL W P-5'-P-CCNC: 23 U/L (ref 7–52)
ANION GAP SERPL CALCULATED.3IONS-SCNC: 9 MMOL/L (ref 4–13)
AST SERPL W P-5'-P-CCNC: 21 U/L (ref 13–39)
BASOPHILS # BLD AUTO: 0.04 THOUSANDS/ÂΜL (ref 0–0.1)
BASOPHILS NFR BLD AUTO: 1 % (ref 0–1)
BILIRUB SERPL-MCNC: 0.45 MG/DL (ref 0.2–1)
BUN SERPL-MCNC: 22 MG/DL (ref 5–25)
CALCIUM SERPL-MCNC: 9.7 MG/DL (ref 8.4–10.2)
CHLORIDE SERPL-SCNC: 104 MMOL/L (ref 96–108)
CHOLEST SERPL-MCNC: 105 MG/DL (ref ?–200)
CO2 SERPL-SCNC: 28 MMOL/L (ref 21–32)
CREAT SERPL-MCNC: 1.09 MG/DL (ref 0.6–1.3)
CREAT UR-MCNC: 156.3 MG/DL
EOSINOPHIL # BLD AUTO: 0.38 THOUSAND/ÂΜL (ref 0–0.61)
EOSINOPHIL NFR BLD AUTO: 4 % (ref 0–6)
ERYTHROCYTE [DISTWIDTH] IN BLOOD BY AUTOMATED COUNT: 13.3 % (ref 11.6–15.1)
EST. AVERAGE GLUCOSE BLD GHB EST-MCNC: 117 MG/DL
GFR SERPL CREATININE-BSD FRML MDRD: 66 ML/MIN/1.73SQ M
GLUCOSE P FAST SERPL-MCNC: 109 MG/DL (ref 65–99)
HBA1C MFR BLD: 5.7 %
HCT VFR BLD AUTO: 39.4 % (ref 36.5–49.3)
HDLC SERPL-MCNC: 39 MG/DL
HGB BLD-MCNC: 13.2 G/DL (ref 12–17)
IMM GRANULOCYTES # BLD AUTO: 0.04 THOUSAND/UL (ref 0–0.2)
IMM GRANULOCYTES NFR BLD AUTO: 1 % (ref 0–2)
LDLC SERPL CALC-MCNC: 39 MG/DL (ref 0–100)
LYMPHOCYTES # BLD AUTO: 1.67 THOUSANDS/ÂΜL (ref 0.6–4.47)
LYMPHOCYTES NFR BLD AUTO: 20 % (ref 14–44)
MCH RBC QN AUTO: 30.6 PG (ref 26.8–34.3)
MCHC RBC AUTO-ENTMCNC: 33.5 G/DL (ref 31.4–37.4)
MCV RBC AUTO: 91 FL (ref 82–98)
MICROALBUMIN UR-MCNC: 58.8 MG/L
MICROALBUMIN/CREAT 24H UR: 38 MG/G CREATININE (ref 0–30)
MONOCYTES # BLD AUTO: 0.85 THOUSAND/ÂΜL (ref 0.17–1.22)
MONOCYTES NFR BLD AUTO: 10 % (ref 4–12)
NEUTROPHILS # BLD AUTO: 5.6 THOUSANDS/ÂΜL (ref 1.85–7.62)
NEUTS SEG NFR BLD AUTO: 64 % (ref 43–75)
NRBC BLD AUTO-RTO: 0 /100 WBCS
PLATELET # BLD AUTO: 229 THOUSANDS/UL (ref 149–390)
PMV BLD AUTO: 10.4 FL (ref 8.9–12.7)
POTASSIUM SERPL-SCNC: 4.3 MMOL/L (ref 3.5–5.3)
PROT SERPL-MCNC: 6.7 G/DL (ref 6.4–8.4)
PSA SERPL-MCNC: 0.41 NG/ML (ref 0–4)
RBC # BLD AUTO: 4.31 MILLION/UL (ref 3.88–5.62)
SODIUM SERPL-SCNC: 141 MMOL/L (ref 135–147)
TRIGL SERPL-MCNC: 134 MG/DL (ref ?–150)
WBC # BLD AUTO: 8.58 THOUSAND/UL (ref 4.31–10.16)

## 2025-04-29 PROCEDURE — 85025 COMPLETE CBC W/AUTO DIFF WBC: CPT

## 2025-04-29 PROCEDURE — 83036 HEMOGLOBIN GLYCOSYLATED A1C: CPT

## 2025-04-29 PROCEDURE — 80061 LIPID PANEL: CPT

## 2025-04-29 PROCEDURE — 82570 ASSAY OF URINE CREATININE: CPT

## 2025-04-29 PROCEDURE — 82043 UR ALBUMIN QUANTITATIVE: CPT

## 2025-04-29 PROCEDURE — 80053 COMPREHEN METABOLIC PANEL: CPT

## 2025-04-29 PROCEDURE — 36415 COLL VENOUS BLD VENIPUNCTURE: CPT

## 2025-04-29 PROCEDURE — G0103 PSA SCREENING: HCPCS

## 2025-05-12 ENCOUNTER — APPOINTMENT (OUTPATIENT)
Dept: LAB | Facility: CLINIC | Age: 75
End: 2025-05-12
Payer: MEDICARE

## 2025-05-12 ENCOUNTER — OFFICE VISIT (OUTPATIENT)
Dept: FAMILY MEDICINE CLINIC | Facility: CLINIC | Age: 75
End: 2025-05-12
Payer: MEDICARE

## 2025-05-12 VITALS
RESPIRATION RATE: 18 BRPM | DIASTOLIC BLOOD PRESSURE: 60 MMHG | SYSTOLIC BLOOD PRESSURE: 126 MMHG | WEIGHT: 242 LBS | TEMPERATURE: 98.4 F | OXYGEN SATURATION: 97 % | BODY MASS INDEX: 38.89 KG/M2 | HEIGHT: 66 IN | HEART RATE: 66 BPM

## 2025-05-12 DIAGNOSIS — E78.2 MIXED HYPERLIPIDEMIA: Primary | ICD-10-CM

## 2025-05-12 DIAGNOSIS — E55.9 VITAMIN D DEFICIENCY: ICD-10-CM

## 2025-05-12 DIAGNOSIS — R35.0 URINE FREQUENCY: ICD-10-CM

## 2025-05-12 DIAGNOSIS — R82.90 FOUL SMELLING URINE: ICD-10-CM

## 2025-05-12 DIAGNOSIS — D50.8 OTHER IRON DEFICIENCY ANEMIA: ICD-10-CM

## 2025-05-12 DIAGNOSIS — R53.83 OTHER FATIGUE: ICD-10-CM

## 2025-05-12 DIAGNOSIS — E11.29 TYPE 2 DIABETES MELLITUS WITH MICROALBUMINURIA, WITHOUT LONG-TERM CURRENT USE OF INSULIN (HCC): ICD-10-CM

## 2025-05-12 DIAGNOSIS — E11.9 TYPE 2 DIABETES MELLITUS WITHOUT COMPLICATION, WITHOUT LONG-TERM CURRENT USE OF INSULIN (HCC): ICD-10-CM

## 2025-05-12 DIAGNOSIS — E66.01 MORBID OBESITY WITH BMI OF 40.0-44.9, ADULT (HCC): ICD-10-CM

## 2025-05-12 DIAGNOSIS — C64.9 RENAL CELL CARCINOMA, UNSPECIFIED LATERALITY (HCC): ICD-10-CM

## 2025-05-12 DIAGNOSIS — R80.9 TYPE 2 DIABETES MELLITUS WITH MICROALBUMINURIA, WITHOUT LONG-TERM CURRENT USE OF INSULIN (HCC): ICD-10-CM

## 2025-05-12 LAB
25(OH)D3 SERPL-MCNC: 37.2 NG/ML (ref 30–100)
FERRITIN SERPL-MCNC: 177 NG/ML (ref 30–336)
IRON SATN MFR SERPL: 19 % (ref 15–50)
IRON SERPL-MCNC: 64 UG/DL (ref 50–212)
TIBC SERPL-MCNC: 340.2 UG/DL (ref 250–450)
TRANSFERRIN SERPL-MCNC: 243 MG/DL (ref 203–362)
TSH SERPL DL<=0.05 MIU/L-ACNC: 1.54 UIU/ML (ref 0.45–4.5)
UIBC SERPL-MCNC: 276 UG/DL (ref 155–355)

## 2025-05-12 PROCEDURE — 81001 URINALYSIS AUTO W/SCOPE: CPT

## 2025-05-12 PROCEDURE — 99214 OFFICE O/P EST MOD 30 MIN: CPT | Performed by: NURSE PRACTITIONER

## 2025-05-12 PROCEDURE — 87186 SC STD MICRODIL/AGAR DIL: CPT

## 2025-05-12 PROCEDURE — 82728 ASSAY OF FERRITIN: CPT

## 2025-05-12 PROCEDURE — 83540 ASSAY OF IRON: CPT

## 2025-05-12 PROCEDURE — 83550 IRON BINDING TEST: CPT

## 2025-05-12 PROCEDURE — 82306 VITAMIN D 25 HYDROXY: CPT

## 2025-05-12 PROCEDURE — 87077 CULTURE AEROBIC IDENTIFY: CPT

## 2025-05-12 PROCEDURE — 84443 ASSAY THYROID STIM HORMONE: CPT

## 2025-05-12 PROCEDURE — 87086 URINE CULTURE/COLONY COUNT: CPT

## 2025-05-12 PROCEDURE — G2211 COMPLEX E/M VISIT ADD ON: HCPCS | Performed by: NURSE PRACTITIONER

## 2025-05-12 PROCEDURE — 36415 COLL VENOUS BLD VENIPUNCTURE: CPT

## 2025-05-12 RX ORDER — TIRZEPATIDE 15 MG/.5ML
15 INJECTION, SOLUTION SUBCUTANEOUS WEEKLY
Qty: 6 ML | Refills: 3 | Status: SHIPPED | OUTPATIENT
Start: 2025-05-12

## 2025-05-12 NOTE — PROGRESS NOTES
Name: Hay Dela Cruz      : 1950      MRN: [id number]  Encounter Provider: BRANDI Mayer  Encounter Date: 2025   Encounter department: [facility]IMARY [facility]  :  Assessment & Plan  Type 2 diabetes mellitus without complication, without long-term current use of insulin ([facility])    Lab Results   Component Value Date    HGBA1C 5.7 (H) 2025       Orders:    Tirzepatide (Mounjaro) 15 MG/0.5ML SOAJ; Inject 15 mg under the skin once a week    Hemoglobin A1C; Future    Comprehensive metabolic panel; Future    CBC and differential; Future    Renal cell carcinoma, unspecified laterality (HCC)         Morbid obesity with BMI of 40.0-44.9, adult ([facility])           Type 2 diabetes mellitus with microalbuminuria, without long-term current use of insulin ([facility])    Lab Results   Component Value Date    HGBA1C 5.7 (H) 2025            Mixed hyperlipidemia    Orders:    Lipid Panel with Direct LDL reflex; Future    Other iron deficiency anemia        Orders:    Iron Panel (Includes Ferritin, Iron Sat%, Iron, and TIBC); Future    Foul smelling urine    Orders:    Urinalysis with microscopic; Future    Urine culture; Future    Urine frequency    Orders:    Urinalysis with microscopic; Future    Urine culture; Future    Vitamin D deficiency    Orders:    Vitamin D 25 hydroxy; Future    Other fatigue    Orders:    TSH, 3rd generation with Free T4 reflex; Future          Depression Screening and Follow-up Plan: Patient was screened for depression during today's encounter. They screened negative with a PHQ-2 score of 0.        History of Present Illness   Here for 6 month medcheck and lab review- HgA1c Improved to 5.7, weight loss at about 40 pounds, he would like to increase Mounjaro. Will increase to 15mg/weekly. If HgA1c lower in 6 months is agreeable to start decreasing oral diabetic medication    C/o urinary frequency, going at least every 2 hours, dribbles, incontinence. Reports is more foul  "smelling recently. Is taking Flomax as directed by urology- next appointment July 18. He will get urine sample done today and call urology sooner if no infection    C/o increased fatigue and decreased ambition- will check additional labs today      Review of Systems   Constitutional:  Positive for fatigue. Negative for activity change, diaphoresis and fever.   HENT:  Negative for congestion, facial swelling, hearing loss, rhinorrhea, sinus pressure, sinus pain, sneezing, sore throat and voice change.    Eyes:  Negative for discharge and visual disturbance.   Respiratory:  Negative for cough, choking, chest tightness, shortness of breath, wheezing and stridor.    Cardiovascular:  Negative for chest pain, palpitations and leg swelling.   Gastrointestinal:  Negative for abdominal distention, abdominal pain, constipation, diarrhea, nausea and vomiting.   Endocrine: Negative for polydipsia, polyphagia and polyuria.   Genitourinary:  Positive for frequency and urgency. Negative for difficulty urinating and dysuria.   Musculoskeletal:  Negative for arthralgias, back pain, gait problem, joint swelling, myalgias, neck pain and neck stiffness.   Skin:  Negative for color change, rash and wound.   Neurological:  Negative for dizziness, syncope, speech difficulty, weakness, light-headedness and headaches.   Hematological:  Negative for adenopathy. Does not bruise/bleed easily.   Psychiatric/Behavioral:  Negative for agitation, behavioral problems, confusion, hallucinations, sleep disturbance and suicidal ideas. The patient is not nervous/anxious.        Objective   /60   Pulse 66   Temp 98.4 °F (36.9 °C)   Resp 18   Ht 5' 6\" (1.676 m)   Wt 110 kg (242 lb)   SpO2 97%   BMI 39.06 kg/m²      Physical Exam  Vitals and nursing note reviewed. Exam conducted with a chaperone present (wife).   Constitutional:       General: He is not in acute distress.     Appearance: Normal appearance.   Neck:      Thyroid: No " thyromegaly.      Vascular: No carotid bruit.   Cardiovascular:      Rate and Rhythm: Normal rate and regular rhythm.      Pulses: no weak pulses.           Dorsalis pedis pulses are 2+ on the right side and 2+ on the left side.      Heart sounds: Normal heart sounds.   Pulmonary:      Effort: Pulmonary effort is normal. No respiratory distress.      Breath sounds: Normal breath sounds. No wheezing.   Musculoskeletal:      Cervical back: Neck supple.      Right lower leg: No edema.      Left lower leg: No edema.   Feet:      Right foot:      Skin integrity: Dry skin present. No ulcer, skin breakdown, erythema, warmth or callus.      Left foot:      Skin integrity: Dry skin present. No ulcer, skin breakdown, erythema, warmth or callus.   Lymphadenopathy:      Cervical: No cervical adenopathy.   Neurological:      Mental Status: He is alert and oriented to person, place, and time.   Psychiatric:         Mood and Affect: Mood normal.         Behavior: Behavior normal.         Thought Content: Thought content normal.         Judgment: Judgment normal.         Patient's shoes and socks removed.    Right Foot/Ankle   Right Foot Inspection  Skin Exam: skin normal, skin intact and dry skin. No warmth, no callus, no erythema, no maceration, no abnormal color, no pre-ulcer, no ulcer and no callus.     Toe Exam: ROM and strength within normal limits.     Sensory   Monofilament testing: intact    Vascular  Capillary refills: < 3 seconds  The right DP pulse is 2+.     Left Foot/Ankle  Left Foot Inspection  Skin Exam: skin normal, skin intact and dry skin. No warmth, no erythema, no maceration, normal color, no pre-ulcer, no ulcer and no callus.     Toe Exam: ROM and strength within normal limits.     Sensory   Monofilament testing: intact    Vascular  Capillary refills: < 3 seconds  The left DP pulse is 2+.     Assign Risk Category  No deformity present  No loss of protective sensation  No weak pulses  Risk: 0

## 2025-05-13 ENCOUNTER — TELEPHONE (OUTPATIENT)
Age: 75
End: 2025-05-13

## 2025-05-13 LAB

## 2025-05-14 ENCOUNTER — RESULTS FOLLOW-UP (OUTPATIENT)
Dept: FAMILY MEDICINE CLINIC | Facility: CLINIC | Age: 75
End: 2025-05-14

## 2025-05-14 DIAGNOSIS — N30.00 ACUTE CYSTITIS WITHOUT HEMATURIA: Primary | ICD-10-CM

## 2025-05-14 LAB — BACTERIA UR CULT: ABNORMAL

## 2025-05-14 RX ORDER — SULFAMETHOXAZOLE AND TRIMETHOPRIM 800; 160 MG/1; MG/1
1 TABLET ORAL 2 TIMES DAILY
Qty: 14 TABLET | Refills: 0 | Status: SHIPPED | OUTPATIENT
Start: 2025-05-14 | End: 2025-05-21

## 2025-05-22 DIAGNOSIS — I10 ESSENTIAL HYPERTENSION: ICD-10-CM

## 2025-05-22 DIAGNOSIS — R80.9 TYPE 2 DIABETES MELLITUS WITH MICROALBUMINURIA, WITHOUT LONG-TERM CURRENT USE OF INSULIN (HCC): ICD-10-CM

## 2025-05-22 DIAGNOSIS — E78.2 MIXED HYPERLIPIDEMIA: ICD-10-CM

## 2025-05-22 DIAGNOSIS — E11.29 TYPE 2 DIABETES MELLITUS WITH MICROALBUMINURIA, WITHOUT LONG-TERM CURRENT USE OF INSULIN (HCC): ICD-10-CM

## 2025-05-22 RX ORDER — PIOGLITAZONE 15 MG/1
15 TABLET ORAL DAILY
Qty: 90 TABLET | Refills: 1 | Status: SHIPPED | OUTPATIENT
Start: 2025-05-22

## 2025-05-22 RX ORDER — HYDROCHLOROTHIAZIDE 12.5 MG/1
12.5 TABLET ORAL DAILY
Qty: 90 TABLET | Refills: 1 | Status: SHIPPED | OUTPATIENT
Start: 2025-05-22

## 2025-05-22 RX ORDER — VALSARTAN 160 MG/1
160 TABLET ORAL DAILY
Qty: 90 TABLET | Refills: 1 | Status: SHIPPED | OUTPATIENT
Start: 2025-05-22

## 2025-05-22 RX ORDER — AMLODIPINE BESYLATE 10 MG/1
10 TABLET ORAL DAILY
Qty: 90 TABLET | Refills: 1 | Status: SHIPPED | OUTPATIENT
Start: 2025-05-22

## 2025-05-22 RX ORDER — ROSUVASTATIN CALCIUM 5 MG/1
5 TABLET, COATED ORAL DAILY
Qty: 90 TABLET | Refills: 1 | Status: SHIPPED | OUTPATIENT
Start: 2025-05-22

## 2025-06-02 DIAGNOSIS — E78.2 MIXED HYPERLIPIDEMIA: ICD-10-CM

## 2025-06-02 RX ORDER — EZETIMIBE 10 MG/1
10 TABLET ORAL DAILY
Qty: 90 TABLET | Refills: 1 | Status: SHIPPED | OUTPATIENT
Start: 2025-06-02

## 2025-06-06 DIAGNOSIS — N40.1 BENIGN PROSTATIC HYPERPLASIA WITH NOCTURIA: ICD-10-CM

## 2025-06-06 DIAGNOSIS — R35.1 BENIGN PROSTATIC HYPERPLASIA WITH NOCTURIA: ICD-10-CM

## 2025-06-06 RX ORDER — FINASTERIDE 5 MG/1
5 TABLET, FILM COATED ORAL DAILY
Qty: 30 TABLET | Refills: 5 | Status: SHIPPED | OUTPATIENT
Start: 2025-06-06

## 2025-06-06 NOTE — TELEPHONE ENCOUNTER
Reason for call:   [x] Refill   [] Prior Auth  [] Other:     Office:   [] PCP/Provider -   [x] Specialty/Provider -     Medication: finasteride (PROSCAR) 5 mg tablet     Dose/Frequency: Take 1 tablet (5 mg total) by mouth daily     Quantity: 30    Pharmacy: [facility]   Does the patient have enough for 3 days?   [] Yes   [x] No - Send as HP to POD

## 2025-06-30 ENCOUNTER — HOSPITAL ENCOUNTER (OUTPATIENT)
Dept: CT IMAGING | Facility: HOSPITAL | Age: 75
Discharge: HOME | End: 2025-06-30
Payer: MEDICARE

## 2025-06-30 DIAGNOSIS — N28.9 RENAL LESION: ICD-10-CM

## 2025-06-30 DIAGNOSIS — R91.1 PULMONARY NODULE: ICD-10-CM

## 2025-06-30 DIAGNOSIS — N28.89 BILATERAL RENAL MASSES: ICD-10-CM

## 2025-06-30 DIAGNOSIS — C64.9 RENAL CELL CARCINOMA, UNSPECIFIED LATERALITY (HCC): ICD-10-CM

## 2025-06-30 PROCEDURE — 71260 CT THORAX DX C+: CPT

## 2025-06-30 PROCEDURE — 74178 CT ABD&PLV WO CNTR FLWD CNTR: CPT

## 2025-06-30 RX ADMIN — IOHEXOL 100 ML: 350 INJECTION, SOLUTION INTRAVENOUS at 07:59

## 2025-07-09 ENCOUNTER — OFFICE VISIT (OUTPATIENT)
Dept: URGENT CARE | Facility: CLINIC | Age: 75
End: 2025-07-09
Payer: MEDICARE

## 2025-07-09 VITALS
TEMPERATURE: 97.1 F | HEIGHT: 66 IN | DIASTOLIC BLOOD PRESSURE: 53 MMHG | OXYGEN SATURATION: 99 % | HEART RATE: 74 BPM | SYSTOLIC BLOOD PRESSURE: 111 MMHG | BODY MASS INDEX: 37.93 KG/M2 | WEIGHT: 236 LBS | RESPIRATION RATE: 18 BRPM

## 2025-07-09 DIAGNOSIS — L08.9 LOCAL INFECTION OF THE SKIN AND SUBCUTANEOUS TISSUE, UNSPECIFIED: Primary | ICD-10-CM

## 2025-07-09 PROCEDURE — 99213 OFFICE O/P EST LOW 20 MIN: CPT | Performed by: NURSE PRACTITIONER

## 2025-07-09 PROCEDURE — G0463 HOSPITAL OUTPT CLINIC VISIT: HCPCS | Performed by: NURSE PRACTITIONER

## 2025-07-09 RX ORDER — CEPHALEXIN 500 MG/1
500 CAPSULE ORAL 3 TIMES DAILY
Qty: 21 CAPSULE | Refills: 0 | Status: SHIPPED | COMMUNITY
Start: 2025-07-09 | End: 2025-07-16

## 2025-07-09 RX ORDER — ROSUVASTATIN AND EZETIMIBE 10; 10.4 MG/1; MG/1
TABLET ORAL
COMMUNITY
End: 2025-07-18 | Stop reason: ALTCHOICE

## 2025-07-09 RX ORDER — LOSARTAN POTASSIUM 100 MG/1
100 TABLET ORAL DAILY
COMMUNITY
End: 2025-07-18 | Stop reason: ALTCHOICE

## 2025-07-09 NOTE — PROGRESS NOTES
[facility] Now  Name: Hay Dela Cruz      : 1950      MRN: [id number]  Encounter Provider: BRANDI White  Encounter Date: 2025   Encounter department: [facility] [place]  :  Verbally advised patient and wife to make sure one of them physically looks at the wound at least once per day to make sure that it is healing well and not getting worse especially since patient is a diabetic.  They expressed agreement with plan.  Assessment & Plan  Local infection of the skin and subcutaneous tissue, unspecified    Orders:    cephalexin (KEFLEX) 500 mg capsule; Take 1 capsule (500 mg total) by mouth 3 (three) times a day for 7 days        Patient Instructions    Patient Instructions   After 24 hours, symptoms should not get worse.  It may take a few days to start to see improvement.  If symptoms are worsening after 24 hours or if you are not seeing improvement, you should be seen again.     Follow up with PCP in 3-5 days.  Proceed to  ER if symptoms worsen.    If tests are performed, our office will contact you with results only if changes need to made to the care plan discussed with you at the visit. [facility]    Chief Complaint:   Chief Complaint   Patient presents with    Leg Injury     Patient c/o redness and pain to right lateral calf after hitting it off of hose bellamy handle 4 days ago.  Patient presents with healing skin tear.      History of Present Illness   On Saturday hit his calf against the handle on hose reel and sustained a skin tear.  Has been using antibiotic ointment.  Initially had some oozing; none now.  Area has some surrounding redness now--concerned about infection especially since diabetic.  Recent labs reviewed--most recent GFRs 66 and 56 with upper normal creat.  Presents accompanied by his wife.          Review of Systems   Constitutional:  Negative for chills and fever.   Skin:  Positive for color change and wound.  "  All other systems reviewed and are negative.    Past Medical History   Past Medical History[1]  Past Surgical History[2]  Family History[3]  he reports that he has never smoked. He has never used smokeless tobacco. He reports current alcohol use. He reports that he does not use drugs.  Current Outpatient Medications   Medication Instructions    amLODIPine (NORVASC) 10 mg, Oral, Daily    NELLY ASPIRIN EC LOW DOSE PO 81 mg, Daily    cephalexin (KEFLEX) 500 mg, Oral, 3 times daily    ezetimibe (ZETIA) 10 mg, Oral, Daily    Ezetimibe-Rosuvastatin 10-10 MG TABS Take by mouth    finasteride (PROSCAR) 5 mg, Oral, Daily    glucose blood test strip Use to test sugars four times daily CONTOUR TEST STRIP    hydroCHLOROthiazide 12.5 mg, Oral, Daily    Klor-Con M20 20 MEQ tablet 20 mEq, Oral, Daily    losartan (COZAAR) 100 mg, Daily    metFORMIN (GLUCOPHAGE) 500 mg tablet TAKE 2 TABLETS IN THE MORNING AND 1 TABLET AT NIGHT    Mounjaro 15 mg, Subcutaneous, Weekly    Multiple Vitamins-Minerals (CENTRUM SILVER 50+MEN PO)     mupirocin (BACTROBAN) 2 % ointment Topical, Daily, To open wound of left second toe    OneTouch Delica Lancets 33G MISC Use to test sugars three times daily. E11.9    pioglitazone (ACTOS) 15 mg, Oral, Daily    Probiotic Product (PROBIOTIC ADVANCED PO) Take by mouth    promethazine-dextromethorphan (PHENERGAN-DM) 6.25-15 mg/5 mL oral syrup 5 mL, Oral, 4 times daily PRN    rosuvastatin (CRESTOR) 5 mg, Oral, Daily    tamsulosin (FLOMAX) 0.4 mg, Oral, 2 times daily    valsartan (DIOVAN) 160 mg, Oral, Daily   Allergies[4]     Objective   /53   Pulse 74   Temp (!) 97.1 °F (36.2 °C) (Temporal)   Resp 18   Ht 5' 6\" (1.676 m)   Wt 107 kg (236 lb)   SpO2 99%   BMI 38.09 kg/m²      Physical Exam  Vitals and nursing note reviewed.   Constitutional:       General: He is not in acute distress.     Appearance: Normal appearance. He is well-developed and well-groomed. He is not ill-appearing, toxic-appearing or " "diaphoretic.   HENT:      Head: Normocephalic and atraumatic.      Nose: Nose normal.      Mouth/Throat:      Mouth: Mucous membranes are moist.   Pulmonary:      Effort: Pulmonary effort is normal. No respiratory distress.     Musculoskeletal:         General: Normal range of motion.        Legs:       Comments: Well approximated skin tear.  Patient was able to smooth the flap over nicely with only approximately 2 to 3 mm of separation with intact scab.  However, there is localized redness, warmth, swelling consistent with local infection.  No drainage     Skin:     General: Skin is warm.      Capillary Refill: Capillary refill takes less than 2 seconds.      Findings: Erythema and wound present.     Neurological:      General: No focal deficit present.      Mental Status: He is alert and oriented to person, place, and time.     Psychiatric:         Mood and Affect: Mood normal.         Behavior: Behavior normal. Behavior is cooperative.         Thought Content: Thought content normal.         Judgment: Judgment normal.         Portions of the record may have been created with voice recognition software.  Occasional wrong word or \"sound a like\" substitutions may have occurred due to the inherent limitations of voice recognition software.  Read the chart carefully and recognize, using context, where substitutions have occurred.         [1]   Past Medical History:  Diagnosis Date    Carotid artery occlusion     Disease of thyroid gland     Mixed hyperlipidemia     Renal mass    [2]   Past Surgical History:  Procedure Laterality Date    APPENDECTOMY      CARPAL TUNNEL RELEASE      CHOLECYSTECTOMY      COLONOSCOPY  2014    12 polyps     IR CRYOABLATION  10/20/2021    JOINT REPLACEMENT Left     TKR    JOINT REPLACEMENT Right     Total knee replacement     SHOULDER SURGERY Left     repair    [3]   Family History  Problem Relation Name Age of Onset    Breast cancer Mother      Heart disease Father      Stroke Father      " Breast cancer Sister      Sudden death Brother          MVA    Obesity Brother      Diabetes Brother      Obesity Brother     [4] No Known Allergies

## 2025-07-09 NOTE — PATIENT INSTRUCTIONS
After 24 hours, symptoms should not get worse.  It may take a few days to start to see improvement.  If symptoms are worsening after 24 hours or if you are not seeing improvement, you should be seen again.

## 2025-07-18 ENCOUNTER — OFFICE VISIT (OUTPATIENT)
Dept: UROLOGY | Facility: CLINIC | Age: 75
End: 2025-07-18
Payer: MEDICARE

## 2025-07-18 VITALS
WEIGHT: 241 LBS | BODY MASS INDEX: 38.73 KG/M2 | DIASTOLIC BLOOD PRESSURE: 62 MMHG | HEART RATE: 73 BPM | SYSTOLIC BLOOD PRESSURE: 116 MMHG | HEIGHT: 66 IN | OXYGEN SATURATION: 98 % | TEMPERATURE: 97.4 F

## 2025-07-18 DIAGNOSIS — R31.0 GROSS HEMATURIA: ICD-10-CM

## 2025-07-18 DIAGNOSIS — R91.1 LUNG NODULE SEEN ON IMAGING STUDY: ICD-10-CM

## 2025-07-18 DIAGNOSIS — Z12.5 PROSTATE CANCER SCREENING: ICD-10-CM

## 2025-07-18 DIAGNOSIS — C64.9 RENAL CELL CARCINOMA, UNSPECIFIED LATERALITY (HCC): Primary | ICD-10-CM

## 2025-07-18 DIAGNOSIS — N39.41 URGE INCONTINENCE: ICD-10-CM

## 2025-07-18 LAB
POST-VOID RESIDUAL VOLUME, ML POC: 25 ML
SL AMB  POCT GLUCOSE, UA: NORMAL
SL AMB LEUKOCYTE ESTERASE,UA: NORMAL
SL AMB POCT BILIRUBIN,UA: NORMAL
SL AMB POCT BLOOD,UA: NORMAL
SL AMB POCT CLARITY,UA: CLEAR
SL AMB POCT COLOR,UA: YELLOW
SL AMB POCT KETONES,UA: NORMAL
SL AMB POCT NITRITE,UA: NORMAL
SL AMB POCT PH,UA: 5
SL AMB POCT SPECIFIC GRAVITY,UA: 1.02
SL AMB POCT URINE PROTEIN: NORMAL
SL AMB POCT UROBILINOGEN: NORMAL

## 2025-07-18 PROCEDURE — 99214 OFFICE O/P EST MOD 30 MIN: CPT

## 2025-07-18 PROCEDURE — 81002 URINALYSIS NONAUTO W/O SCOPE: CPT

## 2025-07-18 PROCEDURE — 51798 US URINE CAPACITY MEASURE: CPT

## 2025-07-18 RX ORDER — PSYLLIUM HUSK 3.5 G/5.8G
POWDER TOPICAL AS NEEDED
COMMUNITY

## 2025-07-18 NOTE — ASSESSMENT & PLAN NOTE
Right renal cell carcinoma s/p cryoablation 10/20/2021.   He is currently under active surveillance for a approximate 1.9 cm solid enhancing posteromedial right renal lesion. Additional small 0.7 cm indeterminate right posterior mid renal lesion. As well as a chronic 0.8 cm left lower pole indeterminate renal lesion.    Surveillance CT of the chest as well as abdomen pelvis from 6/30/2025 showed stable findings.  No significant change in the appearance of prior cryoablation site either.  No adenopathy identified in the chest abdomen pelvis.  Stable pulmonary nodules.  We will plan for a MRI of the abdomen with and without contrast in 6 months for continued surveillance.  He will follow-up with Dr. Corado around that time to review.  If stable can likely consider yearly surveillance imaging or ultrasound.    Orders:    MRI abdomen w wo contrast; Future

## 2025-07-18 NOTE — ASSESSMENT & PLAN NOTE
History of BPH, likely component of OAB as well.  Emptying well with PVR of 25 mL.  Recommend continuing Flomax 0.4 mg twice daily.  He was started finasteride 5 mg daily around January.  He does not feel there has been much change in his baseline symptoms.  He wishes to continue with finasteride for now.  He will let us know if he decides to discontinue this.    Orders:    POCT urine dip    POCT Measure PVR

## 2025-07-18 NOTE — PROGRESS NOTES
Name: Hay Dela Cruz      : 1950      MRN: [id number]  Encounter Provider: BRANDI Herman  Encounter Date: 2025   Encounter department: [facility] [place]    :  Assessment & Plan  Renal cell carcinoma, unspecified laterality (HCC)  Right renal cell carcinoma s/p cryoablation 10/20/2021.   He is currently under active surveillance for a approximate 1.9 cm solid enhancing posteromedial right renal lesion. Additional small 0.7 cm indeterminate right posterior mid renal lesion. As well as a chronic 0.8 cm left lower pole indeterminate renal lesion.    Surveillance CT of the chest as well as abdomen pelvis from 2025 showed stable findings.  No significant change in the appearance of prior cryoablation site either.  No adenopathy identified in the chest abdomen pelvis.  Stable pulmonary nodules.  We will plan for a MRI of the abdomen with and without contrast in 6 months for continued surveillance.  He will follow-up with Dr. Corado around that time to review.  If stable can likely consider yearly surveillance imaging or ultrasound.    Orders:    MRI abdomen w wo contrast; Future    Lung nodule seen on imaging study  CT chest from  shows stable pulmonary nodules.  These are stable since at least .  Plan to check CT chest without in 1 year.    Orders:    CT chest wo contrast; Future    Gross hematuria  No recurrence of gross hematuria.  Point-of-care urine testing today is negative for infection or microscopic blood.  Doing well.  Continue watchful waiting.    Orders:    POCT urine dip    POCT Measure PVR    Urge incontinence  History of BPH, likely component of OAB as well.  Emptying well with PVR of 25 mL.  Recommend continuing Flomax 0.4 mg twice daily.  He was started finasteride 5 mg daily around January.  He does not feel there has been much change in his baseline symptoms.  He wishes to continue with finasteride for now.  He will let us know if he decides to  discontinue this.    Orders:    POCT urine dip    POCT Measure PVR    Prostate cancer screening  PSA 0.407 (4/29/2025)  This is grossly stable.  If he does decide to continue with finasteride this will need to be corrected in the future.  Plan for follow-up with PSA in 1 year.    Orders:    PSA, Total Screen; Future          Interval HPI:    He presents today reporting doing well.  He has not seen any real change in his baseline urination since adding finasteride 5 mg daily.  He still experiences some postvoid dribbling as well as urgency.  Otherwise doing well.  He denies any recurrence of gross hematuria.  No bothersome urinary symptoms aside from baseline.  Overall doing well.          History of Present Illness     Established patient previously known Dr. Ríos and last seen by me in December 2024 with history of right renal cell carcinoma status post cryoablation 10/20/2021. 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 Surveillance imaging has shown no evidence of recurrence at the right upper pole cryoablation site. He does however have a 1.9 cm right posterior medial lesion which is suspicious for renal cell carcinoma. Additional 0.7 cm posterior right mid renal lesion and 0.8 cm chronic left lower pole lesion. He is currently under active surveillance for thes findings.     Patient had an MRI of the abdomen with and without contrast in 2019 at an outside institution which demonstrated right upper pole hemorrhagic cyst 1.2 cm.  Patient then had an ultrasound that demonstrated progression of this concern with a renal protocol CT with questionable hypoenhancement.  He underwent IR cryoablation and biopsy 10/20/2021 which revealed papillary renal cell neoplasm. During his initial postop visit on 11/11/2021,< 1 month post cryoablation he was 4 wheeling and developed gross hematuria.  He was advised not to ride his 4 ramirez for at least 2 weeks and then resume slowly.  He did not require  evaluation/procedure by Interventional Radiology for this.       Surveillance CT of chest and abdomen from March 2023 had shown interval decrease size of the right upper pole renal neoplasm status post cryoablation.  However there were subcentimeter heterogeneous lesions at the inferior pole of both kidneys, concerning for neoplasm. Additionally there was a 7 mm nodular density at the bronchus intermedius which may represent small amount of mucus or secretions.  Repeat CT chest and abdomen from 9/6/2023 showed interval resolution of the previously seen nodular opacity in the bronchus intermedius which was likely due to mucous.  There is no findings of metastatic disease in the abdomen.  Stable cryoablation zone in the left kidney upper pole measuring 1.7 x 1.5 cm.  Previously seen heterogeneous subcentimeter lesions at the inferior pole of both kidneys were stable in size and consistent with proteinaceous cyst.      He was seen by me in June 2024 after presenting to [facility] emergency department on 6/10/2024 for complaints of gross hematuria, dysuria, and urinary frequency.   Urine was negative for infection and CT imaging showed no acute changes aside from diffuse bladder wall thickening. No hydroureteronephrosis or radiopaque urolithiasis. Unchanged subcentimeter isodense left lower pole nodule, hemorrhagic cyst versus solid. Given stability since at least 2022, it is likely nonaggressive. Unchanged bilateral renal cysts and subcentimeter hypodensities. Hypertrophy of the median lobe of the prostate protruding into the bladder. It may reflect BPH but given hematuria, correlate clinically to exclude prostatic cancer. Diffuse bladder wall thickening, likely due to prostatomegaly, but correlate to exclude symptoms of cystitis.  He does have history of BPH and is on Flomax.  He would undergo cystoscopy evaluation by Dr. Corado on 8/20/2024 which revealed BPH with trilobar hypertrophy and friable vessels.   This was believed to be the cause of his gross hematuria.  Dr. Corado discussed adding finasteride or dutasteride to his regimen however patient elected to observe. He was eventually started on finasteride 5 mg daily in January 2025. PSA 0.54 on 4/17/2024.      Surveillance CT chest abdomen from 9/09/2024 demonstrated stable pulmonary nodules without new suspicious nodules. No significant change in the area of cryoablation in the upper pole the right kidney without detectable solid enhancement to suggest recurrent or residual disease. Question a lesion in the posterior interpolar to lower pole of the right kidney. Consider further characterization with contrast-enhanced MR abdomen and/or renal ultrasound.      MRI abdomen 9/20/2024 revealed approximately 1.9 cm solid enhancing posteromedial right mid renal lesion, concerning for renal cell carcinoma. Additional indeterminate 0.7 cm posterior right mid renal lesion. Follow-up MRI abdomen with and without contrast in 3 months is recommended. 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 Dr. Corado reviewed the MRI and CT results with the patient over the phone and discussed active surveillance, repeat cryotherapy, or partial or radical nephrectomy. Patient elected to continue with active surveillance with repeat MRI in 3 months. Surveillance MRI of the abdomen including MRCP from 12/16/2024 shows stable findings.     CT chest, abdomen and pelvis with and without IV contrast from 6/30/2025 shows stable findings.  No significant change in the right renal mass in the interpolar to lower pole region previously felt to describe a renal cell carcinoma on the MRI abdomen from 9/20/2024.  Several pulmonary nodules are present without change.  No new pulmonary nodules.  No significant change in the appearance of prior cryoablation in the upper pole of the right kidney.  No adenopathy identified in the chest abdomen  and pelvis.  Diminished urinary bladder wall thickening as compared to the prior CT.  Hypodensity again noted in hepatic segment 4 though not as well-visualized.  Hepatic segment 2 lesion not identified with certainty on today's exam.      Prostate cancer screening:    PSA grossly stable at 0.407 as of 4/29/2025              Objective   There were no vitals taken for this visit.    Review of Systems   Constitutional:  Negative for chills and fever.   HENT:  Negative for congestion and sore throat.    Respiratory:  Negative for cough and shortness of breath.    Cardiovascular:  Negative for chest pain and leg swelling.   Gastrointestinal:  Negative for abdominal pain, constipation and diarrhea.   Genitourinary:  Positive for urgency. Negative for difficulty urinating, dysuria, frequency and hematuria.        Postvoid dribbling   Musculoskeletal:  Negative for back pain and gait problem.   Skin:  Negative for wound.   Allergic/Immunologic: Negative for immunocompromised state.   Hematological:  Does not bruise/bleed easily.       Physical Exam  Vitals and nursing note reviewed.   Constitutional:       General: He is not in acute distress.     Appearance: He is well-developed.   HENT:      Head: Normocephalic and atraumatic.     Eyes:      Conjunctiva/sclera: Conjunctivae normal.       Cardiovascular:      Rate and Rhythm: Normal rate and regular rhythm.      Heart sounds: No murmur heard.  Pulmonary:      Effort: Pulmonary effort is normal. No respiratory distress.      Breath sounds: Normal breath sounds.   Abdominal:      Palpations: Abdomen is soft.      Tenderness: There is no abdominal tenderness.     Musculoskeletal:         General: No swelling.      Cervical back: Neck supple.     Skin:     General: Skin is warm and dry.      Capillary Refill: Capillary refill takes less than 2 seconds.     Neurological:      Mental Status: He is alert.     Psychiatric:         Mood and Affect: Mood normal.            Imagin2025    CT CHEST, ABDOMEN AND PELVIS WITH AND WITHOUT IV CONTRAST     INDICATION: C64.9: Malignant neoplasm of unspecified kidney, except renal pelvis  N28.9: Disorder of kidney and ureter, unspecified  R91.1: Solitary pulmonary nodule  N28.89: Other specified disorders of kidney and ureter.     COMPARISON: 2024, CT abdomen and pelvis dated 6/10/2024 and CT chest abdomen and pelvis from 2023 and 2023     TECHNIQUE: Initial CT of the abdomen and pelvis was performed without intravenous contrast. Subsequent dynamic CT evaluation of the chest, abdomen and pelvis was performed after the administration of intravenous contrast was performed. Finally, delayed   dynamic delayed phase postcontrast CT evaluation of the abdomen and pelvis was performed. Multiplanar 2D reformatted images were created from the source data.     This examination, like all CT scans performed in the [facility], was performed utilizing techniques to minimize radiation dose exposure, including the use of iterative reconstruction and automated exposure control. Radiation dose length   product (DLP) for this visit: 4296.06 mGy-cm.     IV Contrast: 100 mL of iohexol (OMNIPAQUE)  Enteric Contrast: Not administered.     FINDINGS:     CHEST     LUNGS:     Several nodules are present and described on series 7:  2 mm left upper lobe perihilar nodule on image 119, unchanged.  5 mm left lower lobe nodule on image 170 measuring 5 mm, present in retrospect without change.  2 mm right upper lobe nodule on image 98, unchanged.  4 mm subpleural right lower lobe nodule on image 190, unchanged.  No new suspicious pulmonary nodules.  Upper lobe predominant mild emphysema.  No tracheal or central airway filling defect.     PLEURA: Unremarkable.     HEART/GREAT VESSELS: Heart is unremarkable for patient's age. No thoracic aortic aneurysm. Atherosclerotic changes.     MEDIASTINUM AND SUJIT: Multiple mediastinal lymph  nodes are present without pathological enlargement.     CHEST WALL AND LOWER NECK: Unremarkable.     ABDOMEN     RIGHT KIDNEY AND URETER:  Previous area of cryoablation in the upper pole the right kidney with 2 punctate foci of calcification. No enhancing mural nodule. No significant change in appearance when compared to the prior study.  Several subcentimeter lesions that are too small to characterize by CT.  Redemonstration of the indeterminate lesion in the interpolar to lower pole of the right kidney which is unchanged in size measuring 1.9 cm on coronal series 606 image #115.  Parapelvic cyst(s).  No hydronephrosis or hydroureter.  1 to 2 mm interpolar calculus.  No perinephric collection.     LEFT KIDNEY AND URETER:  Several subcentimeter low-attenuation lesions that are too small to characterize by CT.  Parapelvic cyst(s).  No solid renal mass or detectable urothelial mass.  No hydronephrosis or hydroureter.  No urinary tract calculi.  No perinephric collection.     URINARY BLADDER:  Under distended diminishing diagnostic assessment. Question of mild circumferential wall thickening.  Prostate projects into the urinary bladder base.  Decreased wall thickening as compared to the prior study.        LIVER/BILIARY TREE: Subtle hypodensity in hepatic segment 4 with ill-defined margins seen on series 6 image #111. Previously noted hepatic segment 2 hypodensity not well visualized on today's exam. No new hepatic lesions identified. No biliary   dilatation.     GALLBLADDER: Post cholecystectomy.     SPLEEN: Unremarkable.     PANCREAS: Unremarkable.     ADRENAL GLANDS: Unremarkable.     STOMACH AND BOWEL: Unremarkable.     APPENDIX: No findings to suggest appendicitis.     ABDOMINOPELVIC CAVITY: No ascites. No pneumoperitoneum. No lymphadenopathy.     VESSELS: Atherosclerosis without abdominal aortic aneurysm.     PELVIS     REPRODUCTIVE ORGANS: Unremarkable for patient's age.     ABDOMINAL WALL/INGUINAL REGIONS:  Unremarkable.     BONES: No acute fracture or suspicious osseous lesion. Spinal degenerative changes.     IMPRESSION:     1.  No significant change in the right renal mass in the interpolar to lower pole region previously felt to describe a renal cell carcinoma on the MR abdomen from 9/20/2024.  2.  Several pulmonary nodules are present without change. No new pulmonary nodules.  3.  No significant change in the appearance of prior cryoablation in the upper pole of the right kidney.  4.  No adenopathy identified in the chest abdomen and pelvis.  5.  Diminished urinary bladder wall thickening as compared to the prior CT.  6.  Hypodensity again noted in hepatic segment 4 though not as well visualized. Hepatic segment 2 lesion not identified with certainty on today's exam.          Please Note:  Voice dictation software has been used to create this document. There may be inadvertent transcriptions errors.     BRANDI Herman 07/18/25

## 2025-07-18 NOTE — ASSESSMENT & PLAN NOTE
No recurrence of gross hematuria.  Point-of-care urine testing today is negative for infection or microscopic blood.  Doing well.  Continue watchful waiting.    Orders:    POCT urine dip    POCT Measure PVR

## 2025-07-18 NOTE — ASSESSMENT & PLAN NOTE
CT chest from June shows stable pulmonary nodules.  These are stable since at least 2023.  Plan to check CT chest without in 1 year.    Orders:    CT chest wo contrast; Future

## 2025-07-23 ENCOUNTER — OFFICE VISIT (OUTPATIENT)
Dept: URGENT CARE | Facility: CLINIC | Age: 75
End: 2025-07-23
Payer: MEDICARE

## 2025-07-23 VITALS
WEIGHT: 241 LBS | TEMPERATURE: 97.5 F | OXYGEN SATURATION: 98 % | BODY MASS INDEX: 38.73 KG/M2 | HEIGHT: 66 IN | SYSTOLIC BLOOD PRESSURE: 114 MMHG | RESPIRATION RATE: 16 BRPM | HEART RATE: 75 BPM | DIASTOLIC BLOOD PRESSURE: 62 MMHG

## 2025-07-23 DIAGNOSIS — L08.9 LOCAL INFECTION OF THE SKIN AND SUBCUTANEOUS TISSUE, UNSPECIFIED: Primary | ICD-10-CM

## 2025-07-23 PROCEDURE — 99213 OFFICE O/P EST LOW 20 MIN: CPT | Performed by: ORTHOPAEDIC SURGERY

## 2025-07-23 PROCEDURE — G0463 HOSPITAL OUTPT CLINIC VISIT: HCPCS | Performed by: ORTHOPAEDIC SURGERY

## 2025-07-23 RX ORDER — CLINDAMYCIN HYDROCHLORIDE 300 MG/1
300 CAPSULE ORAL 4 TIMES DAILY
Qty: 40 CAPSULE | Refills: 0 | Status: SHIPPED | OUTPATIENT
Start: 2025-07-23 | End: 2025-08-02

## 2025-07-24 ENCOUNTER — OFFICE VISIT (OUTPATIENT)
Facility: HOSPITAL | Age: 75
End: 2025-07-24
Payer: MEDICARE

## 2025-07-24 VITALS
WEIGHT: 236 LBS | SYSTOLIC BLOOD PRESSURE: 129 MMHG | TEMPERATURE: 98 F | DIASTOLIC BLOOD PRESSURE: 56 MMHG | RESPIRATION RATE: 18 BRPM | HEIGHT: 66 IN | BODY MASS INDEX: 37.93 KG/M2 | HEART RATE: 69 BPM

## 2025-07-24 DIAGNOSIS — E11.8 CONTROLLED TYPE 2 DIABETES MELLITUS WITH COMPLICATION, WITHOUT LONG-TERM CURRENT USE OF INSULIN (HCC): ICD-10-CM

## 2025-07-24 DIAGNOSIS — S81.801A TRAUMATIC OPEN WOUND OF RIGHT LOWER LEG, INITIAL ENCOUNTER: Primary | ICD-10-CM

## 2025-07-24 PROCEDURE — 99213 OFFICE O/P EST LOW 20 MIN: CPT | Performed by: STUDENT IN AN ORGANIZED HEALTH CARE EDUCATION/TRAINING PROGRAM

## 2025-07-24 PROCEDURE — 97597 DBRDMT OPN WND 1ST 20 CM/<: CPT | Performed by: STUDENT IN AN ORGANIZED HEALTH CARE EDUCATION/TRAINING PROGRAM

## 2025-07-24 PROCEDURE — 99214 OFFICE O/P EST MOD 30 MIN: CPT | Performed by: STUDENT IN AN ORGANIZED HEALTH CARE EDUCATION/TRAINING PROGRAM

## 2025-07-24 RX ORDER — LIDOCAINE 40 MG/G
CREAM TOPICAL ONCE
Status: COMPLETED | OUTPATIENT
Start: 2025-07-24 | End: 2025-07-24

## 2025-07-24 RX ADMIN — LIDOCAINE: 40 CREAM TOPICAL at 09:19

## 2025-07-24 NOTE — PROGRESS NOTES
Patient ID: Hay Dela Cruz is a 74 y.o. male Date of Birth 1950       Chief Complaint   Patient presents with    New Patient Visit     Right lower leg wound, started 3 weeks ago as a skin tear after bumping into a garden hose box.   Went to urgent care 1 week ago , took antibiotic for 7 days, covered with a dry dressing.           Allergies:  Patient has no known allergies.    Diagnosis:   Diagnosis ICD-10-CM Associated Orders   1. Traumatic open wound of right lower leg, initial encounter  S81.801A Wound cleansing and dressings Right;Lower;Lateral Leg     lidocaine (LMX) 4 % cream     Wound Procedure Treatment Right;Lower;Lateral Leg      2. Controlled type 2 diabetes mellitus with complication, without long-term current use of insulin (HCC)  E11.8            Assessment  & Plan:    Initial evaluation of traumatic wound of the R anterior lower leg. There is a thin layer of loose epithelium present over the wound with dried crusted drainage around the periphery trapping exudate underneath. Post debridement there is mostly epithelial tissue underneath and minimal serous drainage. No periwound erythema, edema, lymphangitic streaking to suggest soft tissue infection.   Selective debridement, as below.   Recommend topical application of antibiotic ointment with overlying bandage. Change daily. Keep site covered. Spandagrip for compression. May shower and cleanse site with gentle soap and water. Re-apply dressing post shower. Do not leave open to air.   No need for course of Clindamycin given no signs of infection on clinical examination today.    A1C results reviewed with the patient today.Well controlled at 5.7 as of two months ago.   Obtain 3-4 servings of protein daily for wound healing.   Instructed to monitor for any changes including redness or swelling surrounding the wound, increased drainage or pain as well as fevers or chills.    F/u in 1 week. Instructed to call if any questions or concerns arise in  "meantime.            Subjective:   25: 73 y/o M with PMHx of renal cell CA, HTN, type 2 DM presents for evaluation of a traumatic wound on his R anterior leg. Approximately three weeks ago he hit his leg off of a handle on a hose box resulting in a wound. He noticed some surrounding redness and no improvement of the wound and was concerned for infection therefore he presented to urgent care for further evaluation on 25 and was initiated on Keflex. The wound still had not fully healed therefore he returned to urgent care yesterday and was prescribed Clindamycin but has not yet begun taking it yet. Has been covering the site with a dry dressing.           The following portions of the patient's history were reviewed and updated as appropriate:   Problem List[1]  Past Medical History[2]  Past Surgical History[3]  Family History[4]  Social History[5]  Current Medications[6]    Review of Systems      Objective:  /56 (Patient Position: Sitting)   Pulse 69   Temp 98 °F (36.7 °C) (Temporal)   Resp 18   Ht 5' 6\" (1.676 m)   Wt 107 kg (236 lb)   BMI 38.09 kg/m²   Pain Score: 0-No pain     Physical Exam  Vitals reviewed.   Constitutional:       Appearance: He is obese.     Cardiovascular:      Pulses:           Dorsalis pedis pulses are 2+ on the right side.        Posterior tibial pulses are 2+ on the right side.     Musculoskeletal:      Right lower le+ Edema present.     Skin:     Findings: Wound present. No erythema.           Comments: Traumatic wound of the R anterior lower leg. There is a thin layer of loose epithelium present over the wound with dried crusted drainage around the periphery trapping exudate underneath. Post debridement there is mostly epithelial tissue underneath and minimal serous drainage. No periwound erythema, edema, lymphangitic streaking to suggest soft tissue infection.        Neurological:      Mental Status: He is alert.                Wound 25 Traumatic Leg " "Right;Lower;Lateral (Active)   Wound Image   07/24/25 0842     No drainage initially given fragile epithelium overlying wound with trapped exudate underneath and crusted exudate on the periphery of the wound. Post debridement wound is 1.8 cm x 1.8 cm with areas of partial and full thickness skin breakdown and epithelial tissue mixed within. Small serous drainage without periwound erythema.                 Debridement   Wound 07/24/25 Traumatic Leg Right;Lower;Lateral     Date/Time: 7/24/2025 9:05 AM    Universal Protocol:  procedure performed by consultantConsent: Verbal consent obtained  Consent given by: patient  Time out: Immediately prior to procedure a \"time out\" was called to verify the correct patient, procedure, equipment, support staff and site/side marked as required.  Patient understanding: patient states understanding of the procedure being performed  Patient identity confirmed: verbally with patient    Debridement Details  Performed by: PA  Debridement type: selective  Pain control: lidocaine 4%    Post-debridement measurements  Length (cm): 1.8  Width (cm): 1.8  Depth (cm): 0.1  Percent debrided: 100%  Surface Area (cm^2): 2.54  Area Debrided (cm^2): 2.54  Volume (cm^3): 0.17    Devitalized tissue debrided: exudate and layer of  epeidermis  Instrument(s) utilized: curette  Bleeding: small  Hemostasis obtained with: pressure  Response to treatment: procedure was tolerated well                   Wound Instructions:  Orders Placed This Encounter   Procedures    Wound cleansing and dressings Right;Lower;Lateral Leg     Right leg     Wash your hands with soap and water.  Remove old dressing, discard into plastic bag and place in trash.  Cleanse the wound with unscented soap and water prior to applying a clean dressing. Do not use tissue or cotton balls. Do not scrub the wound. Pat dry using gauze.  Shower yes - remove dressing, then shower, wash wound area last, rinse well, pat dry and apply new " "dressing      Apply bacitracin to the right leg wound. (This is available over the counter).  Cover with gauze  Secure with lukasz and tape   Change dressing DAILY    Spandagrip F to RLE  Elastic Tubular Stocking-spandagrip  Tubular elastic bandage: Apply from base of toes to behind the knee. Apply in AM, may remove for sleep.    Avoid prolonged standing in one place.    Elevate leg(s) above the level of the heart when sitting or as much as possible      Keep wound covered at all times - open to air is open to infection    You do not need antibiotic at this time     Increase protein in your diet with each meal. 3 to 4 servings per day Meat, chicken, eggs, nut, greek yogurt, legumes, and  lentils are good sources of protein.      Monitor for any signs or symptoms of infection such as increase redness or pain, fever/chills, nausea/vomiting, malodor, or  increased drainage. If you have any signs or symptoms please call the wound center, if after hours or on holiday/weekend call your primary care provider or go to the emergency department to be evaluated        Please call the Wound Management Center Monday through Friday between [phone] for any questions.      Follow up at wound management center in 1 week     Standing Status:   Future     Expiration Date:   7/31/2025    Wound Procedure Treatment Right;Lower;Lateral Leg     This order was created via procedure documentation    Debridement     This order was created via procedure documentation       Cally Rush, PA-C          Portions of the record may have been created with voice recognition software. Occasional wrong word or \"sound alike\" substitutions may have occurred due to the inherent limitations of voice recognition software. Read the chart carefully and recognize, using context, where substitutions have occurred.           [1]   Patient Active Problem List  Diagnosis    Encounter for diabetic foot exam (HCC)    Morbid obesity with BMI of 40.0-44.9, adult " (HCC)    Essential hypertension    Lung nodule seen on imaging study    Mixed hyperlipidemia    Bilateral renal masses    Edema    Type 2 diabetes mellitus with microalbuminuria, without long-term current use of insulin (HCC)    Gross hematuria    Renal cell carcinoma (HCC)    Benign prostatic hyperplasia with nocturia    Heart murmur    Urge incontinence   [2]   Past Medical History:  Diagnosis Date    Carotid artery occlusion     Disease of thyroid gland     Mixed hyperlipidemia     Renal mass    [3]   Past Surgical History:  Procedure Laterality Date    APPENDECTOMY      CARPAL TUNNEL RELEASE      CHOLECYSTECTOMY      COLONOSCOPY  2014 12 polyps     IR CRYOABLATION  10/20/2021    JOINT REPLACEMENT Left     TKR    JOINT REPLACEMENT Right     Total knee replacement     SHOULDER SURGERY Left     repair    [4]   Family History  Problem Relation Name Age of Onset    Breast cancer Mother      Heart disease Father      Stroke Father      Breast cancer Sister      Sudden death Brother          MVA    Obesity Brother      Diabetes Brother      Obesity Brother     [5]   Social History  Socioeconomic History    Marital status: /Civil Union   Occupational History    Occupation: [employer] in steady days    Tobacco Use    Smoking status: Never    Smokeless tobacco: Never    Tobacco comments:     Former smoker - As per [facility]    Vaping Use    Vaping status: Never Used   Substance and Sexual Activity    Alcohol use: Yes     Comment: occasionally    Drug use: Never    Sexual activity: Not Currently   Social History Narrative    Consumes on average 3 cups of regular coffee per day      Social Drivers of Health     Financial Resource Strain: Low Risk  (11/2/2023)    Overall Financial Resource Strain (CARDIA)     Difficulty of Paying Living Expenses: Not very hard   Food Insecurity: No Food Insecurity (11/8/2024)    Nursing - Inadequate Food Risk Classification     Worried About Running Out of Food in the Last Year:  Never true     Ran Out of Food in the Last Year: Never true   Transportation Needs: No Transportation Needs (11/8/2024)    PRAPARE - Transportation     Lack of Transportation (Medical): No     Lack of Transportation (Non-Medical): No   Housing Stability: Low Risk  (11/8/2024)    Housing Stability Vital Sign     Unable to Pay for Housing in the Last Year: No     Number of Times Moved in the Last Year: 0     Homeless in the Last Year: No   [6]   Current Outpatient Medications:     amLODIPine (NORVASC) 10 mg tablet, TAKE 1 TABLET DAILY, Disp: 90 tablet, Rfl: 1    NELLY ASPIRIN EC LOW DOSE PO, Take 81 mg by mouth in the morning., Disp: , Rfl:     clindamycin (CLEOCIN) 300 MG capsule, Take 1 capsule (300 mg total) by mouth 4 (four) times a day for 10 days, Disp: 40 capsule, Rfl: 0    ezetimibe (ZETIA) 10 mg tablet, TAKE 1 TABLET DAILY, Disp: 90 tablet, Rfl: 1    finasteride (PROSCAR) 5 mg tablet, Take 1 tablet (5 mg total) by mouth daily, Disp: 30 tablet, Rfl: 5    glucose blood test strip, Use to test sugars four times daily CONTOUR TEST STRIP, Disp: 100 each, Rfl: 5    hydroCHLOROthiazide 12.5 mg tablet, TAKE 1 TABLET DAILY, Disp: 90 tablet, Rfl: 1    Klor-Con M20 20 MEQ tablet, TAKE 1 TABLET DAILY, Disp: 90 tablet, Rfl: 3    metFORMIN (GLUCOPHAGE) 500 mg tablet, TAKE 2 TABLETS IN THE MORNING AND 1 TABLET AT NIGHT, Disp: 270 tablet, Rfl: 3    Multiple Vitamins-Minerals (CENTRUM SILVER 50+MEN PO), Take by mouth in the morning, Disp: , Rfl:     OneTouch Delica Lancets 33G MISC, Use to test sugars three times daily. E11.9, Disp: 100 each, Rfl: 5    pioglitazone (ACTOS) 15 mg tablet, TAKE 1 TABLET DAILY, Disp: 90 tablet, Rfl: 1    Probiotic Product (PROBIOTIC ADVANCED PO), Take by mouth in the morning, Disp: , Rfl:     Psyllium (Konsyl Daily Fiber) 60.3 % POWD, Take by mouth if needed (constipation), Disp: , Rfl:     rosuvastatin (CRESTOR) 5 mg tablet, TAKE 1 TABLET DAILY, Disp: 90 tablet, Rfl: 1    tamsulosin (FLOMAX) 0.4  mg, Take 1 capsule (0.4 mg total) by mouth 2 (two) times a day, Disp: 180 capsule, Rfl: 3    Tirzepatide (Mounjaro) 15 MG/0.5ML SOAJ, Inject 15 mg under the skin once a week, Disp: 6 mL, Rfl: 3    valsartan (DIOVAN) 160 mg tablet, TAKE 1 TABLET DAILY, Disp: 90 tablet, Rfl: 1  No current facility-administered medications for this visit.

## 2025-07-24 NOTE — PROGRESS NOTES
Wound Procedure Treatment Right;Lower;Lateral Leg    Performed by: Madelaine Barber RN  Authorized by: Keesha Rush PA-C  Associated wounds:   Wound 07/24/25 Traumatic Leg Right;Lower;Lateral    Wound cleansed with:  NSS   Applied topical:  Other   Applied secondary dressing:  Gauze   Dressing secured with:  Karli, Tape, Elastic tubular stocking and Size F   Comments:  Bacitracin

## 2025-07-24 NOTE — PATIENT INSTRUCTIONS
Orders Placed This Encounter   Procedures    Wound cleansing and dressings Right;Lower;Lateral Leg     Right leg     Wash your hands with soap and water.  Remove old dressing, discard into plastic bag and place in trash.  Cleanse the wound with unscented soap and water prior to applying a clean dressing. Do not use tissue or cotton balls. Do not scrub the wound. Pat dry using gauze.  Shower yes - remove dressing, then shower, wash wound area last, rinse well, pat dry and apply new dressing      Apply bacitracin to the right leg wound. (This is available over the counter).  Cover with gauze  Secure with lukasz and tape   Change dressing DAILY    Spandagrip F to RLE  Elastic Tubular Stocking-spandagrip  Tubular elastic bandage: Apply from base of toes to behind the knee. Apply in AM, may remove for sleep.    Avoid prolonged standing in one place.    Elevate leg(s) above the level of the heart when sitting or as much as possible      Keep wound covered at all times - open to air is open to infection    You do not need antibiotic at this time     Increase protein in your diet with each meal. 3 to 4 servings per day Meat, chicken, eggs, nut, greek yogurt, legumes, and  lentils are good sources of protein.      Monitor for any signs or symptoms of infection such as increase redness or pain, fever/chills, nausea/vomiting, malodor, or  increased drainage. If you have any signs or symptoms please call the wound center, if after hours or on holiday/weekend call your primary care provider or go to the emergency department to be evaluated        Please call the Wound Management Center Monday through Friday between 8-430 for any questions.      Follow up at wound management center in 1 week     Standing Status:   Future     Expiration Date:   7/31/2025

## 2025-07-25 ENCOUNTER — TELEPHONE (OUTPATIENT)
Dept: UROLOGY | Facility: CLINIC | Age: 75
End: 2025-07-25

## 2025-07-25 ENCOUNTER — TELEPHONE (OUTPATIENT)
Age: 75
End: 2025-07-25

## 2025-07-25 NOTE — TELEPHONE ENCOUNTER
Wife calling about AVS sheet, states to stop several medications that patient in fact still takes. Informed her we would not say to stop them since we did not prescribe them.

## 2025-07-25 NOTE — TELEPHONE ENCOUNTER
I received a message form the access center, stating patient's wife called regarding his discharge summary. Patient's wife stated the discharge summary stated patient should stop several medications he is taking. She just wanted clarification regarding the stop of the medication. Please advise.

## 2025-07-25 NOTE — TELEPHONE ENCOUNTER
It would appear that he reported that he was not taking those medications at his last visit.  This was the reason they removed.  If patient is indeed still taking them he can continue taking them as prescribed.

## 2025-07-31 ENCOUNTER — OFFICE VISIT (OUTPATIENT)
Facility: HOSPITAL | Age: 75
End: 2025-07-31
Payer: MEDICARE

## 2025-07-31 VITALS
DIASTOLIC BLOOD PRESSURE: 59 MMHG | RESPIRATION RATE: 18 BRPM | HEART RATE: 67 BPM | SYSTOLIC BLOOD PRESSURE: 111 MMHG | TEMPERATURE: 97.3 F

## 2025-07-31 DIAGNOSIS — E11.8 CONTROLLED TYPE 2 DIABETES MELLITUS WITH COMPLICATION, WITHOUT LONG-TERM CURRENT USE OF INSULIN (HCC): ICD-10-CM

## 2025-07-31 DIAGNOSIS — S81.801D TRAUMATIC OPEN WOUND OF RIGHT LOWER LEG, SUBSEQUENT ENCOUNTER: Primary | ICD-10-CM

## 2025-07-31 PROCEDURE — 11042 DBRDMT SUBQ TIS 1ST 20SQCM/<: CPT | Performed by: STUDENT IN AN ORGANIZED HEALTH CARE EDUCATION/TRAINING PROGRAM

## 2025-07-31 RX ORDER — LIDOCAINE 40 MG/G
CREAM TOPICAL ONCE
Status: COMPLETED | OUTPATIENT
Start: 2025-07-31 | End: 2025-07-31

## 2025-07-31 RX ADMIN — LIDOCAINE: 40 CREAM TOPICAL at 09:12

## 2025-08-07 ENCOUNTER — OFFICE VISIT (OUTPATIENT)
Facility: HOSPITAL | Age: 75
End: 2025-08-07
Payer: MEDICARE

## 2025-08-07 VITALS
TEMPERATURE: 96.5 F | DIASTOLIC BLOOD PRESSURE: 72 MMHG | HEART RATE: 72 BPM | RESPIRATION RATE: 18 BRPM | SYSTOLIC BLOOD PRESSURE: 114 MMHG

## 2025-08-07 DIAGNOSIS — S81.801D TRAUMATIC OPEN WOUND OF RIGHT LOWER LEG, SUBSEQUENT ENCOUNTER: Primary | ICD-10-CM

## 2025-08-07 DIAGNOSIS — E11.8 CONTROLLED TYPE 2 DIABETES MELLITUS WITH COMPLICATION, WITHOUT LONG-TERM CURRENT USE OF INSULIN (HCC): ICD-10-CM

## 2025-08-07 PROCEDURE — 97597 DBRDMT OPN WND 1ST 20 CM/<: CPT | Performed by: STUDENT IN AN ORGANIZED HEALTH CARE EDUCATION/TRAINING PROGRAM

## 2025-08-07 RX ORDER — LIDOCAINE 40 MG/G
CREAM TOPICAL ONCE
Status: COMPLETED | OUTPATIENT
Start: 2025-08-07 | End: 2025-08-07

## 2025-08-07 RX ADMIN — LIDOCAINE: 40 CREAM TOPICAL at 09:04

## 2025-08-14 ENCOUNTER — OFFICE VISIT (OUTPATIENT)
Facility: HOSPITAL | Age: 75
End: 2025-08-14
Payer: MEDICARE

## 2025-08-14 PROBLEM — I87.2 VENOUS INSUFFICIENCY (CHRONIC) (PERIPHERAL): Status: ACTIVE | Noted: 2025-08-14

## 2025-08-14 PROBLEM — L81.8 HEMOSIDERIN PIGMENTATION OF SKIN: Status: ACTIVE | Noted: 2025-08-14
